# Patient Record
Sex: FEMALE | Race: BLACK OR AFRICAN AMERICAN | NOT HISPANIC OR LATINO | Employment: OTHER | URBAN - METROPOLITAN AREA
[De-identification: names, ages, dates, MRNs, and addresses within clinical notes are randomized per-mention and may not be internally consistent; named-entity substitution may affect disease eponyms.]

---

## 2019-09-01 ENCOUNTER — HOSPITAL ENCOUNTER (EMERGENCY)
Facility: HOSPITAL | Age: 74
Discharge: HOME | End: 2019-09-01
Attending: EMERGENCY MEDICINE
Payer: MEDICARE

## 2019-09-01 VITALS
RESPIRATION RATE: 18 BRPM | HEART RATE: 78 BPM | TEMPERATURE: 99 F | OXYGEN SATURATION: 95 % | WEIGHT: 175 LBS | DIASTOLIC BLOOD PRESSURE: 79 MMHG | SYSTOLIC BLOOD PRESSURE: 129 MMHG | HEIGHT: 66 IN | BODY MASS INDEX: 28.12 KG/M2

## 2019-09-01 DIAGNOSIS — J04.0 LARYNGITIS: ICD-10-CM

## 2019-09-01 DIAGNOSIS — H65.01 RIGHT ACUTE SEROUS OTITIS MEDIA, RECURRENCE NOT SPECIFIED: Primary | ICD-10-CM

## 2019-09-01 DIAGNOSIS — R93.89 ABNORMAL CHEST X-RAY: ICD-10-CM

## 2019-09-01 DIAGNOSIS — R05.9 COUGH: ICD-10-CM

## 2019-09-01 LAB — POCT GLUCOSE: 160 MG/DL (ref 70–110)

## 2019-09-01 PROCEDURE — 82962 GLUCOSE BLOOD TEST: CPT

## 2019-09-01 PROCEDURE — 25000003 PHARM REV CODE 250: Performed by: NURSE PRACTITIONER

## 2019-09-01 PROCEDURE — 99284 EMERGENCY DEPT VISIT MOD MDM: CPT | Mod: 25

## 2019-09-01 RX ORDER — HYDROCODONE BITARTRATE AND ACETAMINOPHEN 5; 325 MG/1; MG/1
1 TABLET ORAL
Status: COMPLETED | OUTPATIENT
Start: 2019-09-01 | End: 2019-09-01

## 2019-09-01 RX ORDER — AMOXICILLIN AND CLAVULANATE POTASSIUM 875; 125 MG/1; MG/1
1 TABLET, FILM COATED ORAL 2 TIMES DAILY
Qty: 14 TABLET | Refills: 0 | Status: ON HOLD | OUTPATIENT
Start: 2019-09-01 | End: 2022-09-15 | Stop reason: HOSPADM

## 2019-09-01 RX ORDER — HYDROCODONE BITARTRATE AND ACETAMINOPHEN 5; 325 MG/1; MG/1
1 TABLET ORAL EVERY 6 HOURS PRN
Qty: 12 TABLET | Refills: 0 | Status: SHIPPED | OUTPATIENT
Start: 2019-09-01

## 2019-09-01 RX ORDER — AMOXICILLIN AND CLAVULANATE POTASSIUM 875; 125 MG/1; MG/1
1 TABLET, FILM COATED ORAL
Status: COMPLETED | OUTPATIENT
Start: 2019-09-01 | End: 2019-09-01

## 2019-09-01 RX ADMIN — HYDROCODONE BITARTRATE AND ACETAMINOPHEN 1 TABLET: 5; 325 TABLET ORAL at 04:09

## 2019-09-01 RX ADMIN — AMOXICILLIN AND CLAVULANATE POTASSIUM 1 TABLET: 875; 125 TABLET, FILM COATED ORAL at 04:09

## 2019-09-01 NOTE — DISCHARGE INSTRUCTIONS
Take antibiotics as ordered. Alternate Tylenol and advil every 3 hours for fever/body aches. Cepacol lozenges, warm fluids to drink, and warm salt water gargles for sore throat. You have been given a medication that may cause drowsiness, do not drive or operate heavy machinery with this medication. Return to the Emergency department for any worsening or failure to improve, otherwise follow up with your primary care provider.

## 2019-09-01 NOTE — ED PROVIDER NOTES
Encounter Date: 9/1/2019       History     Chief Complaint   Patient presents with    Sinusitis     C/o right ear discomfort and sinus pressure x 1 week.    Otalgia     Chief complaint:  Sinusitis and right ear pain    History of present illness:  Patient is a 74-year-old female who reports 1 week of sinus pressure for which she saw her doctor (Dr. Saini) who gave her 2 injections as well as prescription for Tessalon Perles, 2nd gen antihistamine, azithromycin (z-pack).  Pt reports improvement then worsening.  She denies fever, chills, sob, but endorses cough with green sputum.    The history is provided by the patient, the spouse and medical records. No  was used.     Review of patient's allergies indicates:  No Known Allergies  Past Medical History:   Diagnosis Date    Diabetes mellitus     Hypertension      History reviewed. No pertinent surgical history.  History reviewed. No pertinent family history.  Social History     Tobacco Use    Smoking status: Never Smoker    Smokeless tobacco: Never Used   Substance Use Topics    Alcohol use: Not Currently    Drug use: Not on file     Review of Systems   Constitutional: Negative for chills, fatigue and fever.   HENT: Positive for ear pain, sinus pressure and voice change. Negative for congestion, ear discharge, postnasal drip, rhinorrhea, sneezing and sore throat.    Eyes: Negative for discharge and itching.   Respiratory: Positive for cough. Negative for shortness of breath and wheezing.    Cardiovascular: Negative for chest pain, palpitations and leg swelling.   Gastrointestinal: Negative for abdominal pain, constipation, diarrhea, nausea and vomiting.   Endocrine: Negative for polydipsia, polyphagia and polyuria.   Genitourinary: Negative for dysuria, frequency, hematuria, urgency, vaginal bleeding, vaginal discharge and vaginal pain.   Musculoskeletal: Negative for arthralgias and myalgias.   Skin: Negative for rash and wound.    Neurological: Negative for dizziness, seizures, syncope, weakness and numbness.   Hematological: Negative for adenopathy. Does not bruise/bleed easily.   Psychiatric/Behavioral: Negative for self-injury and suicidal ideas. The patient is not nervous/anxious.        Physical Exam     Initial Vitals [09/01/19 0414]   BP Pulse Resp Temp SpO2   (!) 172/80 89 18 99.9 °F (37.7 °C) 96 %      MAP       --         Physical Exam    Nursing note and vitals reviewed.  Constitutional: She appears well-developed and well-nourished.   HENT:   Head: Normocephalic and atraumatic.   Right Ear: Hearing, external ear and ear canal normal. Tympanic membrane is erythematous.   Left Ear: Hearing, tympanic membrane, external ear and ear canal normal.   Nose: No epistaxis. Right sinus exhibits maxillary sinus tenderness and frontal sinus tenderness. Left sinus exhibits maxillary sinus tenderness and frontal sinus tenderness.   Mouth/Throat: Oropharynx is clear and moist.   Hoarse voice   Eyes: Conjunctivae and EOM are normal. Pupils are equal, round, and reactive to light. Right eye exhibits no discharge. Left eye exhibits no discharge.   Neck: Normal range of motion.   Cardiovascular: Regular rhythm, S1 normal, S2 normal and normal heart sounds. Exam reveals no gallop.    No murmur heard.  Pulmonary/Chest: Effort normal and breath sounds normal. No respiratory distress. She has no decreased breath sounds. She has no wheezes. She has no rhonchi. She has no rales.   Abdominal: She exhibits no distension.   Musculoskeletal: Normal range of motion.   Neurological: She is alert and oriented to person, place, and time.   Skin: Skin is dry. Capillary refill takes less than 2 seconds.         ED Course   Procedures  Labs Reviewed   POCT GLUCOSE MONITORING CONTINUOUS          Imaging Results    None          Medical Decision Making:   Initial Assessment:   74-year-old female with sinus pressure, right ear pain  Differential Diagnosis:    Sinusitis, upper respiratory infection, pneumonia, otitis externa, otitis media, mastoiditis  ED Management:  Initial orders include Augmentin 875, Norco 5/325, point of care glucose, chest x-ray.                   ED Course as of Sep 01 0541   Sun Sep 01, 2019   0437 POCT Glucose(!): 160 [VC]      ED Course User Index  [VC] Johann Valdez DNP     Clinical Impression:       ICD-10-CM ICD-9-CM   1. Right acute serous otitis media, recurrence not specified H65.01 381.01   2. Cough R05 786.2   3. Laryngitis J04.0 464.00   4. Abnormal chest x-ray R93.89 793.2     Patient reported pain relief with Norco.  Prescription was provided for home use with drowsy warning.  She should continue Augmentin for the right ear infection and further coverage for prevention of pneumonia.  Dr. Cira shelley interpreted the chest x-ray and feels that an area is possibly a pulmonary nodule for which the patient should follow up with her primary care provider.  Patient to return for any worsening or changes in condition. Symptomatic therapies and return precautions on AVS.   Medication choices were made after reviewing allergies, medications, history, available laboratories.     Disposition:   Disposition: Discharged  Condition: Stable                        Johann Valdez DNP  09/01/19 0542

## 2019-09-03 ENCOUNTER — TELEPHONE (OUTPATIENT)
Dept: EMERGENCY MEDICINE | Facility: HOSPITAL | Age: 74
End: 2019-09-03

## 2019-09-05 ENCOUNTER — TELEPHONE (OUTPATIENT)
Dept: URGENT CARE | Facility: CLINIC | Age: 74
End: 2019-09-05

## 2019-09-05 NOTE — TELEPHONE ENCOUNTER
Patient called back was notified to follow up with primary care provider regarding the pulmonary nodule.  She states she is still having symptoms of ear pain and was notified to follow up with ENT.

## 2022-09-14 ENCOUNTER — HOSPITAL ENCOUNTER (OUTPATIENT)
Facility: HOSPITAL | Age: 77
Discharge: HOME | End: 2022-09-15
Attending: STUDENT IN AN ORGANIZED HEALTH CARE EDUCATION/TRAINING PROGRAM | Admitting: STUDENT IN AN ORGANIZED HEALTH CARE EDUCATION/TRAINING PROGRAM
Payer: MEDICARE

## 2022-09-14 DIAGNOSIS — R07.9 CHEST PAIN: Primary | ICD-10-CM

## 2022-09-14 DIAGNOSIS — M25.511 SHOULDER PAIN, RIGHT: ICD-10-CM

## 2022-09-14 DIAGNOSIS — I10 HYPERTENSION, UNSPECIFIED TYPE: ICD-10-CM

## 2022-09-14 DIAGNOSIS — M19.90 OSTEOARTHRITIS, UNSPECIFIED OSTEOARTHRITIS TYPE, UNSPECIFIED SITE: ICD-10-CM

## 2022-09-14 PROBLEM — E11.9 DM (DIABETES MELLITUS): Status: ACTIVE | Noted: 2022-09-14

## 2022-09-14 PROBLEM — E78.5 HLD (HYPERLIPIDEMIA): Status: ACTIVE | Noted: 2022-09-14

## 2022-09-14 PROBLEM — R52 ACUTE PAIN: Status: ACTIVE | Noted: 2022-09-14

## 2022-09-14 LAB
ALBUMIN SERPL BCP-MCNC: 3.6 G/DL (ref 3.5–5.2)
ALP SERPL-CCNC: 72 U/L (ref 55–135)
ALT SERPL W/O P-5'-P-CCNC: 9 U/L (ref 10–44)
ANION GAP SERPL CALC-SCNC: 11 MMOL/L (ref 8–16)
ASCENDING AORTA: 2.91 CM
AST SERPL-CCNC: 14 U/L (ref 10–40)
AV INDEX (PROSTH): 0.9
AV MEAN GRADIENT: 6 MMHG
AV PEAK GRADIENT: 12 MMHG
AV VALVE AREA: 3.01 CM2
AV VELOCITY RATIO: 0.7
BASOPHILS # BLD AUTO: 0.02 K/UL (ref 0–0.2)
BASOPHILS NFR BLD: 0.2 % (ref 0–1.9)
BILIRUB SERPL-MCNC: 0.3 MG/DL (ref 0.1–1)
BNP SERPL-MCNC: 48 PG/ML (ref 0–99)
BUN SERPL-MCNC: 21 MG/DL (ref 8–23)
CALCIUM SERPL-MCNC: 10.3 MG/DL (ref 8.7–10.5)
CHLORIDE SERPL-SCNC: 101 MMOL/L (ref 95–110)
CHOLEST SERPL-MCNC: 161 MG/DL (ref 120–199)
CHOLEST/HDLC SERPL: 3.6 {RATIO} (ref 2–5)
CO2 SERPL-SCNC: 25 MMOL/L (ref 23–29)
CREAT SERPL-MCNC: 0.9 MG/DL (ref 0.5–1.4)
CTP QC/QA: YES
CV ECHO LV RWT: 0.64 CM
CV STRESS BASE HR: 71 BPM
DIASTOLIC BLOOD PRESSURE: 83 MMHG
DIFFERENTIAL METHOD: ABNORMAL
DOP CALC AO PEAK VEL: 1.7 M/S
DOP CALC AO VTI: 31 CM
DOP CALC LVOT AREA: 3.3 CM2
DOP CALC LVOT DIAMETER: 2.06 CM
DOP CALC LVOT PEAK VEL: 1.19 M/S
DOP CALC LVOT STROKE VOLUME: 93.27 CM3
DOP CALCLVOT PEAK VEL VTI: 28 CM
E WAVE DECELERATION TIME: 283.67 MSEC
E/A RATIO: 0.88
ECHO LV POSTERIOR WALL: 1.3 CM (ref 0.6–1.1)
EJECTION FRACTION: 70 %
EOSINOPHIL # BLD AUTO: 0.1 K/UL (ref 0–0.5)
EOSINOPHIL NFR BLD: 0.7 % (ref 0–8)
ERYTHROCYTE [DISTWIDTH] IN BLOOD BY AUTOMATED COUNT: 12.6 % (ref 11.5–14.5)
EST. GFR  (NO RACE VARIABLE): >60 ML/MIN/1.73 M^2
ESTIMATED AVG GLUCOSE: 217 MG/DL (ref 68–131)
FRACTIONAL SHORTENING: 35 % (ref 28–44)
GLUCOSE SERPL-MCNC: 194 MG/DL (ref 70–110)
HBA1C MFR BLD: 9.2 % (ref 4–5.6)
HCT VFR BLD AUTO: 37.7 % (ref 37–48.5)
HDLC SERPL-MCNC: 45 MG/DL (ref 40–75)
HDLC SERPL: 28 % (ref 20–50)
HGB BLD-MCNC: 12.8 G/DL (ref 12–16)
IMM GRANULOCYTES # BLD AUTO: 0.03 K/UL (ref 0–0.04)
IMM GRANULOCYTES NFR BLD AUTO: 0.3 % (ref 0–0.5)
INTERVENTRICULAR SEPTUM: 1.27 CM (ref 0.6–1.1)
IVC DIAMETER: 1.29 CM
IVRT: 110.37 MSEC
LA MAJOR: 4.2 CM
LA MINOR: 3.66 CM
LA WIDTH: 3.7 CM
LDLC SERPL CALC-MCNC: 70 MG/DL (ref 63–159)
LEFT ATRIUM SIZE: 4.17 CM
LEFT ATRIUM VOLUME: 51.3 CM3
LEFT INTERNAL DIMENSION IN SYSTOLE: 2.65 CM (ref 2.1–4)
LEFT VENTRICLE DIASTOLIC VOLUME: 73.78 ML
LEFT VENTRICLE SYSTOLIC VOLUME: 25.75 ML
LEFT VENTRICULAR INTERNAL DIMENSION IN DIASTOLE: 4.09 CM (ref 3.5–6)
LEFT VENTRICULAR MASS: 189.46 G
LV LATERAL E/E' RATIO: 7.56 M/S
LVOT MG: 3.81 MMHG
LVOT MV: 0.93 CM/S
LYMPHOCYTES # BLD AUTO: 3.8 K/UL (ref 1–4.8)
LYMPHOCYTES NFR BLD: 35.7 % (ref 18–48)
MAGNESIUM SERPL-MCNC: 1.3 MG/DL (ref 1.6–2.6)
MCH RBC QN AUTO: 28.7 PG (ref 27–31)
MCHC RBC AUTO-ENTMCNC: 34 G/DL (ref 32–36)
MCV RBC AUTO: 85 FL (ref 82–98)
MONOCYTES # BLD AUTO: 1.1 K/UL (ref 0.3–1)
MONOCYTES NFR BLD: 9.8 % (ref 4–15)
MV PEAK A VEL: 0.77 M/S
MV PEAK E VEL: 0.68 M/S
MV STENOSIS PRESSURE HALF TIME: 82.26 MS
MV VALVE AREA P 1/2 METHOD: 2.67 CM2
NEUTROPHILS # BLD AUTO: 5.7 K/UL (ref 1.8–7.7)
NEUTROPHILS NFR BLD: 53.3 % (ref 38–73)
NONHDLC SERPL-MCNC: 116 MG/DL
NRBC BLD-RTO: 0 /100 WBC
NUC STRESS EJECTION FRACTION: 79 %
OHS CV CPX 85 PERCENT MAX PREDICTED HEART RATE MALE: 119
OHS CV CPX MAX PREDICTED HEART RATE: 140
OHS CV CPX PATIENT IS FEMALE: 1
OHS CV CPX PATIENT IS MALE: 0
OHS CV CPX PEAK DIASTOLIC BLOOD PRESSURE: 72 MMHG
OHS CV CPX PEAK HEAR RATE: 104 BPM
OHS CV CPX PEAK RATE PRESSURE PRODUCT: NORMAL
OHS CV CPX PEAK SYSTOLIC BLOOD PRESSURE: 161 MMHG
OHS CV CPX PERCENT MAX PREDICTED HEART RATE ACHIEVED: 74
OHS CV CPX RATE PRESSURE PRODUCT PRESENTING: NORMAL
PHOSPHATE SERPL-MCNC: 3.2 MG/DL (ref 2.7–4.5)
PISA TR MAX VEL: 2.72 M/S
PLATELET # BLD AUTO: 226 K/UL (ref 150–450)
PMV BLD AUTO: 10.4 FL (ref 9.2–12.9)
POCT GLUCOSE: 189 MG/DL (ref 70–110)
POCT GLUCOSE: 268 MG/DL (ref 70–110)
POCT GLUCOSE: 341 MG/DL (ref 70–110)
POCT GLUCOSE: 362 MG/DL (ref 70–110)
POTASSIUM SERPL-SCNC: 3.7 MMOL/L (ref 3.5–5.1)
PROT SERPL-MCNC: 7.4 G/DL (ref 6–8.4)
PV PEAK VELOCITY: 0.95 CM/S
RA MAJOR: 5.02 CM
RA PRESSURE: 3 MMHG
RA WIDTH: 3.14 CM
RBC # BLD AUTO: 4.46 M/UL (ref 4–5.4)
RIGHT VENTRICULAR END-DIASTOLIC DIMENSION: 3.67 CM
SARS-COV-2 RDRP RESP QL NAA+PROBE: NEGATIVE
SINUS: 2.84 CM
SODIUM SERPL-SCNC: 137 MMOL/L (ref 136–145)
STJ: 2.34 CM
SYSTOLIC BLOOD PRESSURE: 151 MMHG
TDI LATERAL: 0.09 M/S
TR MAX PG: 30 MMHG
TRICUSPID ANNULAR PLANE SYSTOLIC EXCURSION: 2.14 CM
TRIGL SERPL-MCNC: 230 MG/DL (ref 30–150)
TROPONIN I SERPL DL<=0.01 NG/ML-MCNC: <0.006 NG/ML (ref 0–0.03)
TSH SERPL DL<=0.005 MIU/L-ACNC: 0.66 UIU/ML (ref 0.4–4)
TV PEAK GRADIENT: 0.83 MMHG
TV REST PULMONARY ARTERY PRESSURE: 33 MMHG
WBC # BLD AUTO: 10.66 K/UL (ref 3.9–12.7)

## 2022-09-14 PROCEDURE — 25000003 PHARM REV CODE 250: Performed by: NURSE PRACTITIONER

## 2022-09-14 PROCEDURE — G0378 HOSPITAL OBSERVATION PER HR: HCPCS

## 2022-09-14 PROCEDURE — 80053 COMPREHEN METABOLIC PANEL: CPT | Performed by: STUDENT IN AN ORGANIZED HEALTH CARE EDUCATION/TRAINING PROGRAM

## 2022-09-14 PROCEDURE — 85025 COMPLETE CBC W/AUTO DIFF WBC: CPT | Performed by: STUDENT IN AN ORGANIZED HEALTH CARE EDUCATION/TRAINING PROGRAM

## 2022-09-14 PROCEDURE — 36415 COLL VENOUS BLD VENIPUNCTURE: CPT | Performed by: NURSE PRACTITIONER

## 2022-09-14 PROCEDURE — 99285 EMERGENCY DEPT VISIT HI MDM: CPT | Mod: 25

## 2022-09-14 PROCEDURE — 96375 TX/PRO/DX INJ NEW DRUG ADDON: CPT | Mod: 59

## 2022-09-14 PROCEDURE — 25000003 PHARM REV CODE 250: Performed by: STUDENT IN AN ORGANIZED HEALTH CARE EDUCATION/TRAINING PROGRAM

## 2022-09-14 PROCEDURE — 84484 ASSAY OF TROPONIN QUANT: CPT | Mod: 91 | Performed by: NURSE PRACTITIONER

## 2022-09-14 PROCEDURE — U0002 COVID-19 LAB TEST NON-CDC: HCPCS | Performed by: STUDENT IN AN ORGANIZED HEALTH CARE EDUCATION/TRAINING PROGRAM

## 2022-09-14 PROCEDURE — 99220 PR INITIAL OBSERVATION CARE,LEVL III: ICD-10-PCS | Mod: 25,,, | Performed by: INTERNAL MEDICINE

## 2022-09-14 PROCEDURE — 63600175 PHARM REV CODE 636 W HCPCS: Performed by: STUDENT IN AN ORGANIZED HEALTH CARE EDUCATION/TRAINING PROGRAM

## 2022-09-14 PROCEDURE — 63600175 PHARM REV CODE 636 W HCPCS: Performed by: INTERNAL MEDICINE

## 2022-09-14 PROCEDURE — 83735 ASSAY OF MAGNESIUM: CPT | Performed by: STUDENT IN AN ORGANIZED HEALTH CARE EDUCATION/TRAINING PROGRAM

## 2022-09-14 PROCEDURE — 84100 ASSAY OF PHOSPHORUS: CPT | Performed by: NURSE PRACTITIONER

## 2022-09-14 PROCEDURE — 93010 EKG 12-LEAD: ICD-10-PCS | Mod: ,,, | Performed by: INTERNAL MEDICINE

## 2022-09-14 PROCEDURE — 83036 HEMOGLOBIN GLYCOSYLATED A1C: CPT | Performed by: NURSE PRACTITIONER

## 2022-09-14 PROCEDURE — 82962 GLUCOSE BLOOD TEST: CPT

## 2022-09-14 PROCEDURE — 99220 PR INITIAL OBSERVATION CARE,LEVL III: CPT | Mod: 25,,, | Performed by: INTERNAL MEDICINE

## 2022-09-14 PROCEDURE — 96372 THER/PROPH/DIAG INJ SC/IM: CPT | Mod: 59 | Performed by: STUDENT IN AN ORGANIZED HEALTH CARE EDUCATION/TRAINING PROGRAM

## 2022-09-14 PROCEDURE — 96372 THER/PROPH/DIAG INJ SC/IM: CPT | Performed by: NURSE PRACTITIONER

## 2022-09-14 PROCEDURE — 96374 THER/PROPH/DIAG INJ IV PUSH: CPT | Mod: 59

## 2022-09-14 PROCEDURE — 84484 ASSAY OF TROPONIN QUANT: CPT | Mod: 91 | Performed by: STUDENT IN AN ORGANIZED HEALTH CARE EDUCATION/TRAINING PROGRAM

## 2022-09-14 PROCEDURE — 93010 ELECTROCARDIOGRAM REPORT: CPT | Mod: ,,, | Performed by: INTERNAL MEDICINE

## 2022-09-14 PROCEDURE — 63600175 PHARM REV CODE 636 W HCPCS: Performed by: NURSE PRACTITIONER

## 2022-09-14 PROCEDURE — 84443 ASSAY THYROID STIM HORMONE: CPT | Performed by: NURSE PRACTITIONER

## 2022-09-14 PROCEDURE — 93005 ELECTROCARDIOGRAM TRACING: CPT

## 2022-09-14 PROCEDURE — 80061 LIPID PANEL: CPT | Performed by: NURSE PRACTITIONER

## 2022-09-14 PROCEDURE — 83880 ASSAY OF NATRIURETIC PEPTIDE: CPT | Performed by: STUDENT IN AN ORGANIZED HEALTH CARE EDUCATION/TRAINING PROGRAM

## 2022-09-14 RX ORDER — HYDRALAZINE HYDROCHLORIDE 20 MG/ML
10 INJECTION INTRAMUSCULAR; INTRAVENOUS EVERY 6 HOURS PRN
Status: DISCONTINUED | OUTPATIENT
Start: 2022-09-14 | End: 2022-09-15 | Stop reason: HOSPADM

## 2022-09-14 RX ORDER — MORPHINE SULFATE 4 MG/ML
6 INJECTION, SOLUTION INTRAMUSCULAR; INTRAVENOUS
Status: COMPLETED | OUTPATIENT
Start: 2022-09-14 | End: 2022-09-14

## 2022-09-14 RX ORDER — ATORVASTATIN CALCIUM 10 MG/1
20 TABLET, FILM COATED ORAL DAILY
Status: DISCONTINUED | OUTPATIENT
Start: 2022-09-14 | End: 2022-09-15 | Stop reason: HOSPADM

## 2022-09-14 RX ORDER — SODIUM CHLORIDE 0.9 % (FLUSH) 0.9 %
10 SYRINGE (ML) INJECTION
Status: DISCONTINUED | OUTPATIENT
Start: 2022-09-14 | End: 2022-09-15 | Stop reason: HOSPADM

## 2022-09-14 RX ORDER — INSULIN ASPART 100 [IU]/ML
0-5 INJECTION, SOLUTION INTRAVENOUS; SUBCUTANEOUS EVERY 6 HOURS PRN
Status: DISCONTINUED | OUTPATIENT
Start: 2022-09-14 | End: 2022-09-15 | Stop reason: HOSPADM

## 2022-09-14 RX ORDER — ENOXAPARIN SODIUM 100 MG/ML
40 INJECTION SUBCUTANEOUS EVERY 24 HOURS
Status: DISCONTINUED | OUTPATIENT
Start: 2022-09-14 | End: 2022-09-15 | Stop reason: HOSPADM

## 2022-09-14 RX ORDER — HYDRALAZINE HYDROCHLORIDE 20 MG/ML
10 INJECTION INTRAMUSCULAR; INTRAVENOUS
Status: COMPLETED | OUTPATIENT
Start: 2022-09-14 | End: 2022-09-14

## 2022-09-14 RX ORDER — REGADENOSON 0.08 MG/ML
0.4 INJECTION, SOLUTION INTRAVENOUS ONCE
Status: COMPLETED | OUTPATIENT
Start: 2022-09-14 | End: 2022-09-14

## 2022-09-14 RX ORDER — LISINOPRIL 20 MG/1
20 TABLET ORAL 2 TIMES DAILY
Status: DISCONTINUED | OUTPATIENT
Start: 2022-09-14 | End: 2022-09-15 | Stop reason: HOSPADM

## 2022-09-14 RX ORDER — CETIRIZINE HYDROCHLORIDE 5 MG/1
5 TABLET ORAL DAILY
Status: DISCONTINUED | OUTPATIENT
Start: 2022-09-14 | End: 2022-09-15 | Stop reason: HOSPADM

## 2022-09-14 RX ORDER — OMEPRAZOLE 20 MG/1
20 CAPSULE, DELAYED RELEASE ORAL DAILY
COMMUNITY

## 2022-09-14 RX ORDER — GLUCAGON 1 MG
1 KIT INJECTION
Status: DISCONTINUED | OUTPATIENT
Start: 2022-09-14 | End: 2022-09-15 | Stop reason: HOSPADM

## 2022-09-14 RX ORDER — METFORMIN HYDROCHLORIDE 500 MG/1
500 TABLET ORAL 2 TIMES DAILY WITH MEALS
COMMUNITY

## 2022-09-14 RX ORDER — KETOROLAC TROMETHAMINE 30 MG/ML
15 INJECTION, SOLUTION INTRAMUSCULAR; INTRAVENOUS
Status: COMPLETED | OUTPATIENT
Start: 2022-09-14 | End: 2022-09-14

## 2022-09-14 RX ORDER — METOPROLOL SUCCINATE 200 MG/1
200 TABLET, EXTENDED RELEASE ORAL DAILY
COMMUNITY

## 2022-09-14 RX ORDER — CETIRIZINE HYDROCHLORIDE 10 MG/1
10 TABLET ORAL DAILY
COMMUNITY

## 2022-09-14 RX ORDER — DEXAMETHASONE SODIUM PHOSPHATE 4 MG/ML
12 INJECTION, SOLUTION INTRA-ARTICULAR; INTRALESIONAL; INTRAMUSCULAR; INTRAVENOUS; SOFT TISSUE
Status: COMPLETED | OUTPATIENT
Start: 2022-09-14 | End: 2022-09-14

## 2022-09-14 RX ORDER — METOPROLOL SUCCINATE 50 MG/1
200 TABLET, EXTENDED RELEASE ORAL DAILY
Status: DISCONTINUED | OUTPATIENT
Start: 2022-09-14 | End: 2022-09-15 | Stop reason: HOSPADM

## 2022-09-14 RX ORDER — OXYCODONE AND ACETAMINOPHEN 5; 325 MG/1; MG/1
1 TABLET ORAL EVERY 6 HOURS PRN
Status: DISCONTINUED | OUTPATIENT
Start: 2022-09-14 | End: 2022-09-15 | Stop reason: HOSPADM

## 2022-09-14 RX ORDER — ATORVASTATIN CALCIUM 10 MG/1
10 TABLET, FILM COATED ORAL DAILY
COMMUNITY

## 2022-09-14 RX ORDER — LISINOPRIL 20 MG/1
20 TABLET ORAL 2 TIMES DAILY
COMMUNITY

## 2022-09-14 RX ORDER — PANTOPRAZOLE SODIUM 40 MG/1
40 TABLET, DELAYED RELEASE ORAL DAILY
Status: DISCONTINUED | OUTPATIENT
Start: 2022-09-14 | End: 2022-09-15 | Stop reason: HOSPADM

## 2022-09-14 RX ADMIN — INSULIN ASPART 1 UNITS: 100 INJECTION, SOLUTION INTRAVENOUS; SUBCUTANEOUS at 11:09

## 2022-09-14 RX ADMIN — METOPROLOL SUCCINATE 200 MG: 50 TABLET, EXTENDED RELEASE ORAL at 11:09

## 2022-09-14 RX ADMIN — NITROGLYCERIN 1 INCH: 20 OINTMENT TOPICAL at 11:09

## 2022-09-14 RX ADMIN — PANTOPRAZOLE SODIUM 40 MG: 40 TABLET, DELAYED RELEASE ORAL at 12:09

## 2022-09-14 RX ADMIN — INSULIN ASPART 5 UNITS: 100 INJECTION, SOLUTION INTRAVENOUS; SUBCUTANEOUS at 05:09

## 2022-09-14 RX ADMIN — LISINOPRIL 20 MG: 20 TABLET ORAL at 11:09

## 2022-09-14 RX ADMIN — INSULIN ASPART 4 UNITS: 100 INJECTION, SOLUTION INTRAVENOUS; SUBCUTANEOUS at 12:09

## 2022-09-14 RX ADMIN — NITROGLYCERIN 1 INCH: 20 OINTMENT TOPICAL at 05:09

## 2022-09-14 RX ADMIN — KETOROLAC TROMETHAMINE 15 MG: 30 INJECTION, SOLUTION INTRAMUSCULAR; INTRAVENOUS at 04:09

## 2022-09-14 RX ADMIN — LISINOPRIL 20 MG: 20 TABLET ORAL at 09:09

## 2022-09-14 RX ADMIN — INSULIN DETEMIR 25 UNITS: 100 INJECTION, SOLUTION SUBCUTANEOUS at 05:09

## 2022-09-14 RX ADMIN — NITROGLYCERIN 1 INCH: 20 OINTMENT TOPICAL at 04:09

## 2022-09-14 RX ADMIN — DEXAMETHASONE SODIUM PHOSPHATE 12 MG: 4 INJECTION INTRA-ARTICULAR; INTRALESIONAL; INTRAMUSCULAR; INTRAVENOUS; SOFT TISSUE at 03:09

## 2022-09-14 RX ADMIN — HYDRALAZINE HYDROCHLORIDE 10 MG: 20 INJECTION INTRAMUSCULAR; INTRAVENOUS at 03:09

## 2022-09-14 RX ADMIN — REGADENOSON 0.4 MG: 0.08 INJECTION, SOLUTION INTRAVENOUS at 10:09

## 2022-09-14 RX ADMIN — MORPHINE SULFATE 6 MG: 4 INJECTION INTRAVENOUS at 03:09

## 2022-09-14 RX ADMIN — ENOXAPARIN SODIUM 40 MG: 100 INJECTION SUBCUTANEOUS at 05:09

## 2022-09-14 RX ADMIN — CETIRIZINE HYDROCHLORIDE 5 MG: 5 TABLET ORAL at 12:09

## 2022-09-14 RX ADMIN — ATORVASTATIN CALCIUM 20 MG: 10 TABLET, FILM COATED ORAL at 11:09

## 2022-09-14 NOTE — PLAN OF CARE
Problem: Adult Inpatient Plan of Care  Goal: Plan of Care Review  Outcome: Ongoing, Progressing  Goal: Patient-Specific Goal (Individualized)  Outcome: Ongoing, Progressing  Goal: Absence of Hospital-Acquired Illness or Injury  Outcome: Ongoing, Progressing  Goal: Optimal Comfort and Wellbeing  Outcome: Ongoing, Progressing  Goal: Readiness for Transition of Care  Outcome: Ongoing, Progressing     Problem: Diabetes Comorbidity  Goal: Blood Glucose Level Within Targeted Range  Outcome: Ongoing, Progressing     Progressing well. Planned for d/c today but held d/t hyperglycemia. All questions and concerns answered and addressed. Will continue with POC.

## 2022-09-14 NOTE — H&P
[facility]  History & Physical    Patient Name: Sussy Fulton  MRN: [id number]  Patient Class: OP- Observation  Admission Date: 9/14/2022  Attending Physician: Erasmo Alvarez MD   Primary Care Provider: Arlen Rivera MD         Patient information was obtained from patient and ER records.     Subjective:     Principal Problem:Chest pain    Chief Complaint:   Chief Complaint   Patient presents with    Arm Pain     R arm pain and stiffness, that started yesterday morning. Reports last night she started having palpitations and chest pain, denies SOB, n/v.         HPI: 77 year old female  presents with arm pain and chest pain.  Patient states that her arm pain is chronic but acutely worsened today, starts at the neck and radiates down her right arm to her hand, he has 9/10 intensity currently, aggravated with movement of the arm, no relieving factors, and associated with decreased range of motion secondary to pain.  Patient reports a chronic history of neck and arm pain, though today it is worse without new injury/trauma.  She took Tylenol and an arthritis medication without relief.  She also reports an episode of substernal chest pressure around 8:00 p.m. that lasted about 30 minutes and was associated with mild shortness of breath.  It was nonexertional.  ROS otherwise negative.Denies tobacco use and alcohol use. PMHx of HTN and DM      Past Medical History:   Diagnosis Date    Diabetes mellitus     Hypertension        No past surgical history on file.    Review of patient's allergies indicates:  No Known Allergies    No current facility-administered medications on file prior to encounter.     Current Outpatient Medications on File Prior to Encounter   Medication Sig    amoxicillin-clavulanate 875-125mg (AUGMENTIN) 875-125 mg per tablet Take 1 tablet by mouth 2 (two) times daily.    HYDROcodone-acetaminophen (NORCO) 5-325 mg per tablet Take 1 tablet by mouth every 6 (six)  hours as needed.     Family History    None       Tobacco Use    Smoking status: Never    Smokeless tobacco: Never   Substance and Sexual Activity    Alcohol use: Not Currently    Drug use: Not on file    Sexual activity: Not on file     Review of Systems  Constitutional:  Negative for chills and fever.   HENT:  Negative for congestion and sinus pain.    Eyes:  Negative for pain and visual disturbance.   Respiratory:  Positive for shortness of breath. Negative for cough.    Cardiovascular:  Positive for chest pain. Negative for leg swelling.   Gastrointestinal:  Negative for abdominal pain, constipation, diarrhea, nausea and vomiting.   Endocrine: Negative for polydipsia and polyuria.   Genitourinary:  Negative for dysuria and hematuria.   Musculoskeletal:  Positive for neck pain. Negative for arthralgias and back pain.   Skin:  Negative for color change and rash.   Neurological:  Negative for dizziness, seizures, syncope, weakness, light-headedness and headaches.      Objective:     Vital Signs (Most Recent):  Temp: 98.1 °F (36.7 °C) (09/14/22 0218)  Pulse: 82 (09/14/22 0401)  Resp: (!) 24 (09/14/22 0401)  BP: (!) 187/84 (09/14/22 0401)  SpO2: 99 % (09/14/22 0401)   Vital Signs (24h Range):  Temp:  [98.1 °F (36.7 °C)] 98.1 °F (36.7 °C)  Pulse:  [71-82] 82  Resp:  [16-24] 24  SpO2:  [99 %-100 %] 99 %  BP: (168-235)/() 187/84     Weight: 81.6 kg (180 lb)  Body mass index is 29.05 kg/m².    Physical Exam   Nursing note and vitals reviewed.  Constitutional: She appears well-developed and well-nourished. She is not diaphoretic. No distress.   HENT:   Head: Normocephalic and atraumatic.   Eyes: Conjunctivae and EOM are normal. Pupils are equal, round, and reactive to light.   Neck: Neck supple. No JVD present.   No cervical spine tenderness to palpation   Normal range of motion.  Cardiovascular:  Normal rate, regular rhythm, normal heart sounds and intact distal pulses.           No murmur  heard.  Pulmonary/Chest: Breath sounds normal. No respiratory distress. She has no wheezes. She has no rhonchi. She has no rales.   Abdominal: Abdomen is soft. She exhibits no distension. There is no abdominal tenderness. There is no rebound and no guarding.   Musculoskeletal:         General: No edema.      Cervical back: Normal range of motion and neck supple.      Comments: Mild TTP of trapezius on right.   Neck: neck/right arm pain exacerbated by movement of the neck   Neurological: She is alert and oriented to person, place, and time. She has normal strength. No sensory deficit.   Skin: Skin is warm and dry. Capillary refill takes less than 2 seconds.      Significant Labs: All pertinent labs within the past 24 hours have been reviewed.    Significant Imaging: I have reviewed all pertinent imaging results/findings within the past 24 hours.    Assessment/Plan:     * Chest pain  Consult cards  Trend trop <0.006  Echo in am  Tele monitoring  EKG as needed  Nitro paste q6H       HLD (hyperlipidemia)  Resume statin (patient doesn't know the dose)\  Lipid panel pending       Acute pain  C/o neck pain and right arm  X-ray:  No definite radiographic evidence of acute displaced fracture or dislocation of the right shoulder.  Moderate osteoarthrosis.  Pain control   Given steroid IV in the ED       DM (diabetes mellitus)  Patient's FSGs are uncontrolled due to hyperglycemia on current medication regimen.  Last A1c reviewed- No results found for: LABA1C, HGBA1C  Most recent fingerstick glucose reviewed- Recent Labs   Lab 09/14/22  0403   POCTGLUCOSE 189*     Current correctional scale  Low  Maintain anti-hyperglycemic dose as follows-   Antihyperglycemics (From admission, onward)    Start     Stop Route Frequency Ordered    09/14/22 0523  insulin aspart U-100 pen 0-5 Units         -- SubQ Every 6 hours PRN 09/14/22 0423        Hold Oral hypoglycemics while patient is in the hospital.    HTN (hypertension)  BP  uncontrolled  Resume home meds  Hydralazine prn         VTE Risk Mitigation (From admission, onward)         Ordered     enoxaparin injection 40 mg  Daily         09/14/22 0535              As clarification, on 9/14/22 @0530, patient should be placed in hospital observation services under my care in collaboration with MD Randi Shetty NP  Department of Hospital Medicine   [facility] [place] - Emergency Dept

## 2022-09-14 NOTE — SUBJECTIVE & OBJECTIVE
Past Medical History:   Diagnosis Date    Diabetes mellitus     Hypertension        No past surgical history on file.    Review of patient's allergies indicates:  No Known Allergies    No current facility-administered medications on file prior to encounter.     Current Outpatient Medications on File Prior to Encounter   Medication Sig    amoxicillin-clavulanate 875-125mg (AUGMENTIN) 875-125 mg per tablet Take 1 tablet by mouth 2 (two) times daily.    HYDROcodone-acetaminophen (NORCO) 5-325 mg per tablet Take 1 tablet by mouth every 6 (six) hours as needed.     Family History    None       Tobacco Use    Smoking status: Never    Smokeless tobacco: Never   Substance and Sexual Activity    Alcohol use: Not Currently    Drug use: Not on file    Sexual activity: Not on file     Review of Systems  Constitutional:  Negative for chills and fever.   HENT:  Negative for congestion and sinus pain.    Eyes:  Negative for pain and visual disturbance.   Respiratory:  Positive for shortness of breath. Negative for cough.    Cardiovascular:  Positive for chest pain. Negative for leg swelling.   Gastrointestinal:  Negative for abdominal pain, constipation, diarrhea, nausea and vomiting.   Endocrine: Negative for polydipsia and polyuria.   Genitourinary:  Negative for dysuria and hematuria.   Musculoskeletal:  Positive for neck pain. Negative for arthralgias and back pain.   Skin:  Negative for color change and rash.   Neurological:  Negative for dizziness, seizures, syncope, weakness, light-headedness and headaches.      Objective:     Vital Signs (Most Recent):  Temp: 98.1 °F (36.7 °C) (09/14/22 0218)  Pulse: 82 (09/14/22 0401)  Resp: (!) 24 (09/14/22 0401)  BP: (!) 187/84 (09/14/22 0401)  SpO2: 99 % (09/14/22 0401)   Vital Signs (24h Range):  Temp:  [98.1 °F (36.7 °C)] 98.1 °F (36.7 °C)  Pulse:  [71-82] 82  Resp:  [16-24] 24  SpO2:  [99 %-100 %] 99 %  BP: (168-235)/() 187/84     Weight: 81.6 kg (180 lb)  Body mass index is  29.05 kg/m².    Physical Exam   Nursing note and vitals reviewed.  Constitutional: She appears well-developed and well-nourished. She is not diaphoretic. No distress.   HENT:   Head: Normocephalic and atraumatic.   Eyes: Conjunctivae and EOM are normal. Pupils are equal, round, and reactive to light.   Neck: Neck supple. No JVD present.   No cervical spine tenderness to palpation   Normal range of motion.  Cardiovascular:  Normal rate, regular rhythm, normal heart sounds and intact distal pulses.           No murmur heard.  Pulmonary/Chest: Breath sounds normal. No respiratory distress. She has no wheezes. She has no rhonchi. She has no rales.   Abdominal: Abdomen is soft. She exhibits no distension. There is no abdominal tenderness. There is no rebound and no guarding.   Musculoskeletal:         General: No edema.      Cervical back: Normal range of motion and neck supple.      Comments: Mild TTP of trapezius on right.   Neck: neck/right arm pain exacerbated by movement of the neck   Neurological: She is alert and oriented to person, place, and time. She has normal strength. No sensory deficit.   Skin: Skin is warm and dry. Capillary refill takes less than 2 seconds.      Significant Labs: All pertinent labs within the past 24 hours have been reviewed.    Significant Imaging: I have reviewed all pertinent imaging results/findings within the past 24 hours.

## 2022-09-14 NOTE — CONSULTS
[facility]  Cardiology  Consult Note    Patient Name: Sussy Fulton  MRN: [id number]  Admission Date: 9/14/2022  Hospital Length of Stay: 0 days  Code Status: Full Code   Attending Provider: Erasmo Alvarez MD   Consulting Provider: Kendrick Hayward MD  Primary Care Physician: Arlen Rivera MD  Principal Problem:Chest pain    Patient information was obtained from patient and ER records.     Consults  Subjective:     Chief Complaint:  CP        HPI: 77 year old female  presents with arm pain and chest pain.  Patient states that her arm pain is chronic but acutely worsened today, starts at the neck and radiates down her right arm to her hand, he has 9/10 intensity currently, aggravated with movement of the arm, no relieving factors, and associated with decreased range of motion secondary to pain.  Patient reports a chronic history of neck and arm pain, though today it is worse without new injury/trauma.  She took Tylenol and an arthritis medication without relief.  She also reports an episode of substernal chest pressure around 8:00 p.m. that lasted about 30 minutes and was associated with mild shortness of breath.  It was nonexertional.  ROS otherwise negative.Denies tobacco use and alcohol use. PMHx of HTN and DM     Currently denies CP or SOB  EKG NSR - ok  Troponin negative x 2  BNP 48    Echo 9/14/22   The left ventricle is normal in size with concentric hypertrophy and normal systolic function.   The estimated ejection fraction is 70%.   Normal left ventricular diastolic function.   Normal right ventricular size with normal right ventricular systolic function.   Mild left atrial enlargement.   Normal central venous pressure (3 mmHg).   The estimated PA systolic pressure is 33 mmHg.      Past Medical History:   Diagnosis Date    Diabetes mellitus     Hypertension        No past surgical history on file.    Review of patient's allergies indicates:  No Known Allergies    No current  facility-administered medications on file prior to encounter.     Current Outpatient Medications on File Prior to Encounter   Medication Sig    amoxicillin-clavulanate 875-125mg (AUGMENTIN) 875-125 mg per tablet Take 1 tablet by mouth 2 (two) times daily.    atorvastatin (LIPITOR) 10 MG tablet Take 10 mg by mouth once daily. Patient doesn't know the dose    HYDROcodone-acetaminophen (NORCO) 5-325 mg per tablet Take 1 tablet by mouth every 6 (six) hours as needed.    lisinopriL (PRINIVIL,ZESTRIL) 20 MG tablet Take 20 mg by mouth 2 (two) times a day.    metFORMIN (GLUCOPHAGE) 500 MG tablet Take 500 mg by mouth 2 (two) times daily with meals.    metoprolol succinate (TOPROL-XL) 200 MG 24 hr tablet Take 200 mg by mouth once daily.     Family History    None       Tobacco Use    Smoking status: Never    Smokeless tobacco: Never   Substance and Sexual Activity    Alcohol use: Not Currently    Drug use: Not on file    Sexual activity: Not on file     Review of Systems   Constitutional: Negative for decreased appetite.   HENT:  Negative for ear discharge.    Eyes:  Negative for blurred vision.   Respiratory:  Negative for hemoptysis.    Endocrine: Negative for polyphagia.   Hematologic/Lymphatic: Negative for adenopathy.   Skin:  Negative for color change.   Musculoskeletal:  Negative for joint swelling.   Genitourinary:  Negative for bladder incontinence.   Neurological:  Negative for brief paralysis.   Psychiatric/Behavioral:  Negative for hallucinations.    Allergic/Immunologic: Negative for hives.   Objective:     Vital Signs (Most Recent):  Temp: 98.1 °F (36.7 °C) (09/14/22 0218)  Pulse: 84 (09/14/22 0618)  Resp: 15 (09/14/22 0618)  BP: 133/62 (09/14/22 0618)  SpO2: 95 % (09/14/22 0618) Vital Signs (24h Range):  Temp:  [98.1 °F (36.7 °C)] 98.1 °F (36.7 °C)  Pulse:  [71-85] 84  Resp:  [15-24] 15  SpO2:  [95 %-100 %] 95 %  BP: (133-235)/() 133/62     Weight: 81.6 kg (180 lb)  Body mass index is 29.05  kg/m².    SpO2: 95 %  O2 Device (Oxygen Therapy): room air      Intake/Output Summary (Last 24 hours) at 9/14/2022 1026  Last data filed at 9/14/2022 0830  Gross per 24 hour   Intake 0 ml   Output --   Net 0 ml       Lines/Drains/Airways       Peripheral Intravenous Line  Duration                  Peripheral IV - Single Lumen 09/14/22 0315 20 G Posterior;Left Hand <1 day                    Physical Exam  Constitutional:       Appearance: She is well-developed.   HENT:      Head: Normocephalic and atraumatic.   Eyes:      Conjunctiva/sclera: Conjunctivae normal.      Pupils: Pupils are equal, round, and reactive to light.   Cardiovascular:      Rate and Rhythm: Normal rate.      Pulses: Intact distal pulses.      Heart sounds: Normal heart sounds.   Pulmonary:      Effort: Pulmonary effort is normal.      Breath sounds: Normal breath sounds.   Abdominal:      General: Bowel sounds are normal.      Palpations: Abdomen is soft.   Musculoskeletal:         General: Normal range of motion.      Cervical back: Normal range of motion and neck supple.   Skin:     General: Skin is warm and dry.   Neurological:      Mental Status: She is alert and oriented to person, place, and time.       Significant Labs: All pertinent lab results from the last 24 hours have been reviewed.    Significant Imaging: Echocardiogram: Transthoracic echo (TTE) complete (Cupid Only):   Results for orders placed or performed during the hospital encounter of 09/14/22   Echo   Result Value Ref Range    LV LATERAL E/E' RATIO 7.56 m/s    LA WIDTH 3.70 cm    IVC diameter 1.29 cm    Left Ventricular Outflow Tract Mean Velocity 0.93 cm/s    Left Ventricular Outflow Tract Mean Gradient 3.81 mmHg    TDI LATERAL 0.09 m/s    PV PEAK VELOCITY 0.95 cm/s    LVIDd 4.09 3.5 - 6.0 cm    IVS 1.27 (A) 0.6 - 1.1 cm    Posterior Wall 1.30 (A) 0.6 - 1.1 cm    LVIDs 2.65 2.1 - 4.0 cm    FS 35 28 - 44 %    LA volume 51.30 cm3    Sinus 2.84 cm    STJ 2.34 cm    Ascending  aorta 2.91 cm    LV mass 189.46 g    LA size 4.17 cm    RVDD 3.67 cm    TAPSE 2.14 cm    Left Ventricle Relative Wall Thickness 0.64 cm    AV mean gradient 6 mmHg    AV valve area 3.01 cm2    AV Velocity Ratio 0.70     AV index (prosthetic) 0.90     MV valve area p 1/2 method 2.67 cm2    E/A ratio 0.88     E wave deceleration time 283.67 msec    IVRT 110.37 msec    LVOT diameter 2.06 cm    LVOT area 3.3 cm2    LVOT peak mahad 1.19 m/s    LVOT peak VTI 28.00 cm    Ao peak mahad 1.70 m/s    Ao VTI 31.0 cm    LVOT stroke volume 93.27 cm3    AV peak gradient 12 mmHg    TV peak gradient 0.83 mmHg    MV Peak E Mahad 0.68 m/s    TR Max Mahad 2.72 m/s    MV stenosis pressure 1/2 time 82.26 ms    MV Peak A Mahad 0.77 m/s    LV Systolic Volume 25.75 mL    LV Diastolic Volume 73.78 mL    RA Major Axis 5.02 cm    Left Atrium Minor Axis 3.66 cm    Left Atrium Major Axis 4.20 cm    Triscuspid Valve Regurgitation Peak Gradient 30 mmHg    RA Width 3.14 cm    Right Atrial Pressure (from IVC) 3 mmHg    EF 70 %    TV rest pulmonary artery pressure 33 mmHg    Narrative    · The left ventricle is normal in size with concentric hypertrophy and   normal systolic function.  · The estimated ejection fraction is 70%.  · Normal left ventricular diastolic function.  · Normal right ventricular size with normal right ventricular systolic   function.  · Mild left atrial enlargement.  · Normal central venous pressure (3 mmHg).  · The estimated PA systolic pressure is 33 mmHg.        Assessment and Plan:     * Chest pain  Atypical. No MI. EF 70% on echo. Stress test today - ok for d/c if negative for ischemia    HLD (hyperlipidemia)  On statin    DM (diabetes mellitus)  Per primary    HTN (hypertension)  Elevated initially - now controlled        VTE Risk Mitigation (From admission, onward)         Ordered     enoxaparin injection 40 mg  Daily         09/14/22 8923                Thank you for your consult. I will follow-up with patient. Please contact us if  you have any additional questions.    Kendrick Hayward MD  Cardiology   [facility]

## 2022-09-14 NOTE — HPI
HPI: 77 year old female  presents with arm pain and chest pain.  Patient states that her arm pain is chronic but acutely worsened today, starts at the neck and radiates down her right arm to her hand, he has 9/10 intensity currently, aggravated with movement of the arm, no relieving factors, and associated with decreased range of motion secondary to pain.  Patient reports a chronic history of neck and arm pain, though today it is worse without new injury/trauma.  She took Tylenol and an arthritis medication without relief.  She also reports an episode of substernal chest pressure around 8:00 p.m. that lasted about 30 minutes and was associated with mild shortness of breath.  It was nonexertional.  ROS otherwise negative.Denies tobacco use and alcohol use. PMHx of HTN and DM     Currently denies CP or SOB  EKG NSR - ok  Troponin negative x 2  BNP 48    Echo 9/14/22  The left ventricle is normal in size with concentric hypertrophy and normal systolic function.  The estimated ejection fraction is 70%.  Normal left ventricular diastolic function.  Normal right ventricular size with normal right ventricular systolic function.  Mild left atrial enlargement.  Normal central venous pressure (3 mmHg).  The estimated PA systolic pressure is 33 mmHg.

## 2022-09-14 NOTE — PLAN OF CARE
09/14/22 1502   Discharge Planning   Assessment Type Discharge Planning Brief Assessment   Resource/Environmental Concerns none   Support Systems Spouse/significant other   Equipment Currently Used at Home none   Current Living Arrangements home/apartment/condo   Patient/Family Anticipates Transition to home with family   Patient/Family Anticipated Services at Transition none   DME Needed Upon Discharge  none   Discharge Plan A Home with family  (with instructions follow up)

## 2022-09-14 NOTE — ASSESSMENT & PLAN NOTE
C/o chronic right shoulder pain and right arm - since May   Pain most likely 2/2 OA  X-ray:  No definite radiographic evidence of acute displaced fracture or dislocation of the right shoulder.  Moderate osteoarthrosis.  Pain control with NSAIDs and opioids  Given steroid IV in the ED

## 2022-09-14 NOTE — PROGRESS NOTES
[facility] Medicine  Progress Note    Patient Name: Sussy Fulton  MRN: [id number]  Patient Class: OP- Observation   Admission Date: 9/14/2022  Length of Stay: 0 days  Attending Physician: Carmela Johnson MD  Primary Care Provider: Arlen Rivera MD        Subjective:     Principal Problem:Chest pain        HPI:  77 year old female  presents with arm pain and chest pain.  Patient states that her arm pain is chronic but acutely worsened today, starts at the neck and radiates down her right arm to her hand, he has 9/10 intensity currently, aggravated with movement of the arm, no relieving factors, and associated with decreased range of motion secondary to pain.  Patient reports a chronic history of neck and arm pain, though today it is worse without new injury/trauma.  She took Tylenol and an arthritis medication without relief.  She also reports an episode of substernal chest pressure around 8:00 p.m. that lasted about 30 minutes and was associated with mild shortness of breath.  It was nonexertional.  ROS otherwise negative.Denies tobacco use and alcohol use. PMHx of HTN and DM      Overview/Hospital Course:  Patient presented with neck, arm and chest pain that is chronic. Chest pain workup was negative - troponin WNL X2, TTE grossly normal, and stress test unremarkable for any cardiac ischemia. Patient was discharged home and advised to follow up with her PCP for further mgt      Interval History: no acute events overnight. Patient states right shoulder pain is much improved    Review of Systems  Objective:     Vital Signs (Most Recent):  Temp: 98.2 °F (36.8 °C) (09/14/22 1604)  Pulse: 78 (09/14/22 1604)  Resp: 16 (09/14/22 1604)  BP: (!) 113/59 (09/14/22 1604)  SpO2: 99 % (09/14/22 1604)   Vital Signs (24h Range):  Temp:  [98.1 °F (36.7 °C)-98.2 °F (36.8 °C)] 98.2 °F (36.8 °C)  Pulse:  [71-85] 78  Resp:  [15-24] 16  SpO2:  [95 %-100 %] 99 %  BP: (113-235)/() 113/59     Weight:  81.6 kg (180 lb)  Body mass index is 29.05 kg/m².    Intake/Output Summary (Last 24 hours) at 9/14/2022 1656  Last data filed at 9/14/2022 1330  Gross per 24 hour   Intake 240 ml   Output --   Net 240 ml      Physical Exam  Constitutional:       General: She is not in acute distress.     Appearance: She is not ill-appearing.      Comments: Elderly AA f laying in bed in NAD   HENT:      Head: Normocephalic and atraumatic.   Eyes:      Extraocular Movements: Extraocular movements intact.      Conjunctiva/sclera: Conjunctivae normal.      Pupils: Pupils are equal, round, and reactive to light.   Cardiovascular:      Rate and Rhythm: Normal rate and regular rhythm.      Pulses: Normal pulses.      Heart sounds: Normal heart sounds. No murmur heard.    No friction rub. No gallop.   Pulmonary:      Effort: Pulmonary effort is normal. No respiratory distress.      Breath sounds: Normal breath sounds. No stridor. No wheezing, rhonchi or rales.   Chest:      Chest wall: No tenderness.   Abdominal:      General: Abdomen is flat. Bowel sounds are normal.      Palpations: Abdomen is soft.   Musculoskeletal:      Comments: Right shoulder joint tenderness with limited ROM due to pain otherwise no shoulder swelling, erythema or warmth   Skin:     Capillary Refill: Capillary refill takes less than 2 seconds.   Neurological:      General: No focal deficit present.      Mental Status: She is oriented to person, place, and time. Mental status is at baseline.   Psychiatric:         Mood and Affect: Mood normal.         Behavior: Behavior normal.         Thought Content: Thought content normal.         Judgment: Judgment normal.       Significant Labs: All pertinent labs within the past 24 hours have been reviewed.    Significant Imaging: I have reviewed all pertinent imaging results/findings within the past 24 hours.      Assessment/Plan:      * Chest pain  Atypical in nature, workup grossly normal for cardiac etiology  PPI at  home      HLD (hyperlipidemia)  Resume statin (patient doesn't know the dose)\  Lipid panel pending       Right shoulder pain  C/o chronic right shoulder pain and right arm - since May   Pain most likely 2/2 OA  X-ray:  No definite radiographic evidence of acute displaced fracture or dislocation of the right shoulder.  Moderate osteoarthrosis.  Pain control with NSAIDs and opioids  Given steroid IV in the ED       DM (diabetes mellitus)  DM with hyperglycemia - her hyperglycemia seems worsened from IV steroid given in ER  Will keep her overnight for better glycemic control and plan for discharge tomorrow    HTN (hypertension)  BP uncontrolled  Resume home meds  Hydralazine prn         VTE Risk Mitigation (From admission, onward)         Ordered     enoxaparin injection 40 mg  Daily         09/14/22 0596                Discharge Planning   PHYLICIA:      Code Status: Full Code   Is the patient medically ready for discharge?:     Reason for patient still in hospital (select all that apply): Treatment and Pending disposition  Discharge Plan A: Home with family (with instructions follow up)                  Carmela Johnson MD  Department of Hospital Medicine   [facility] [place] - Med Surg

## 2022-09-14 NOTE — ED NOTES
Pt sitting in bed with no new c/o pain at this time; pt to be admitted to observation and awaiting room assignment; will continue to monitor pt

## 2022-09-14 NOTE — ASSESSMENT & PLAN NOTE
Patient's FSGs are uncontrolled due to hyperglycemia on current medication regimen.  Last A1c reviewed- No results found for: LABA1C, HGBA1C  Most recent fingerstick glucose reviewed- Recent Labs   Lab 09/14/22  0403   POCTGLUCOSE 189*     Current correctional scale  Low  Maintain anti-hyperglycemic dose as follows-   Antihyperglycemics (From admission, onward)    Start     Stop Route Frequency Ordered    09/14/22 0523  insulin aspart U-100 pen 0-5 Units         -- SubQ Every 6 hours PRN 09/14/22 0423        Hold Oral hypoglycemics while patient is in the hospital.

## 2022-09-14 NOTE — SUBJECTIVE & OBJECTIVE
Past Medical History:   Diagnosis Date    Diabetes mellitus     Hypertension        No past surgical history on file.    Review of patient's allergies indicates:  No Known Allergies    No current facility-administered medications on file prior to encounter.     Current Outpatient Medications on File Prior to Encounter   Medication Sig    amoxicillin-clavulanate 875-125mg (AUGMENTIN) 875-125 mg per tablet Take 1 tablet by mouth 2 (two) times daily.    atorvastatin (LIPITOR) 10 MG tablet Take 10 mg by mouth once daily. Patient doesn't know the dose    HYDROcodone-acetaminophen (NORCO) 5-325 mg per tablet Take 1 tablet by mouth every 6 (six) hours as needed.    lisinopriL (PRINIVIL,ZESTRIL) 20 MG tablet Take 20 mg by mouth 2 (two) times a day.    metFORMIN (GLUCOPHAGE) 500 MG tablet Take 500 mg by mouth 2 (two) times daily with meals.    metoprolol succinate (TOPROL-XL) 200 MG 24 hr tablet Take 200 mg by mouth once daily.     Family History    None       Tobacco Use    Smoking status: Never    Smokeless tobacco: Never   Substance and Sexual Activity    Alcohol use: Not Currently    Drug use: Not on file    Sexual activity: Not on file     Review of Systems   Constitutional: Negative for decreased appetite.   HENT:  Negative for ear discharge.    Eyes:  Negative for blurred vision.   Respiratory:  Negative for hemoptysis.    Endocrine: Negative for polyphagia.   Hematologic/Lymphatic: Negative for adenopathy.   Skin:  Negative for color change.   Musculoskeletal:  Negative for joint swelling.   Genitourinary:  Negative for bladder incontinence.   Neurological:  Negative for brief paralysis.   Psychiatric/Behavioral:  Negative for hallucinations.    Allergic/Immunologic: Negative for hives.   Objective:     Vital Signs (Most Recent):  Temp: 98.1 °F (36.7 °C) (09/14/22 0218)  Pulse: 84 (09/14/22 0618)  Resp: 15 (09/14/22 0618)  BP: 133/62 (09/14/22 0618)  SpO2: 95 % (09/14/22 0618) Vital Signs (24h Range):  Temp:  [98.1  °F (36.7 °C)] 98.1 °F (36.7 °C)  Pulse:  [71-85] 84  Resp:  [15-24] 15  SpO2:  [95 %-100 %] 95 %  BP: (133-235)/() 133/62     Weight: 81.6 kg (180 lb)  Body mass index is 29.05 kg/m².    SpO2: 95 %  O2 Device (Oxygen Therapy): room air      Intake/Output Summary (Last 24 hours) at 9/14/2022 1026  Last data filed at 9/14/2022 0830  Gross per 24 hour   Intake 0 ml   Output --   Net 0 ml       Lines/Drains/Airways       Peripheral Intravenous Line  Duration                  Peripheral IV - Single Lumen 09/14/22 0315 20 G Posterior;Left Hand <1 day                    Physical Exam  Constitutional:       Appearance: She is well-developed.   HENT:      Head: Normocephalic and atraumatic.   Eyes:      Conjunctiva/sclera: Conjunctivae normal.      Pupils: Pupils are equal, round, and reactive to light.   Cardiovascular:      Rate and Rhythm: Normal rate.      Pulses: Intact distal pulses.      Heart sounds: Normal heart sounds.   Pulmonary:      Effort: Pulmonary effort is normal.      Breath sounds: Normal breath sounds.   Abdominal:      General: Bowel sounds are normal.      Palpations: Abdomen is soft.   Musculoskeletal:         General: Normal range of motion.      Cervical back: Normal range of motion and neck supple.   Skin:     General: Skin is warm and dry.   Neurological:      Mental Status: She is alert and oriented to person, place, and time.       Significant Labs: All pertinent lab results from the last 24 hours have been reviewed.    Significant Imaging: Echocardiogram: Transthoracic echo (TTE) complete (Cupid Only):   Results for orders placed or performed during the hospital encounter of 09/14/22   Echo   Result Value Ref Range    LV LATERAL E/E' RATIO 7.56 m/s    LA WIDTH 3.70 cm    IVC diameter 1.29 cm    Left Ventricular Outflow Tract Mean Velocity 0.93 cm/s    Left Ventricular Outflow Tract Mean Gradient 3.81 mmHg    TDI LATERAL 0.09 m/s    PV PEAK VELOCITY 0.95 cm/s    LVIDd 4.09 3.5 - 6.0 cm     IVS 1.27 (A) 0.6 - 1.1 cm    Posterior Wall 1.30 (A) 0.6 - 1.1 cm    LVIDs 2.65 2.1 - 4.0 cm    FS 35 28 - 44 %    LA volume 51.30 cm3    Sinus 2.84 cm    STJ 2.34 cm    Ascending aorta 2.91 cm    LV mass 189.46 g    LA size 4.17 cm    RVDD 3.67 cm    TAPSE 2.14 cm    Left Ventricle Relative Wall Thickness 0.64 cm    AV mean gradient 6 mmHg    AV valve area 3.01 cm2    AV Velocity Ratio 0.70     AV index (prosthetic) 0.90     MV valve area p 1/2 method 2.67 cm2    E/A ratio 0.88     E wave deceleration time 283.67 msec    IVRT 110.37 msec    LVOT diameter 2.06 cm    LVOT area 3.3 cm2    LVOT peak mahad 1.19 m/s    LVOT peak VTI 28.00 cm    Ao peak mahad 1.70 m/s    Ao VTI 31.0 cm    LVOT stroke volume 93.27 cm3    AV peak gradient 12 mmHg    TV peak gradient 0.83 mmHg    MV Peak E Mahad 0.68 m/s    TR Max Mahad 2.72 m/s    MV stenosis pressure 1/2 time 82.26 ms    MV Peak A Mahad 0.77 m/s    LV Systolic Volume 25.75 mL    LV Diastolic Volume 73.78 mL    RA Major Axis 5.02 cm    Left Atrium Minor Axis 3.66 cm    Left Atrium Major Axis 4.20 cm    Triscuspid Valve Regurgitation Peak Gradient 30 mmHg    RA Width 3.14 cm    Right Atrial Pressure (from IVC) 3 mmHg    EF 70 %    TV rest pulmonary artery pressure 33 mmHg    Narrative    · The left ventricle is normal in size with concentric hypertrophy and   normal systolic function.  · The estimated ejection fraction is 70%.  · Normal left ventricular diastolic function.  · Normal right ventricular size with normal right ventricular systolic   function.  · Mild left atrial enlargement.  · Normal central venous pressure (3 mmHg).  · The estimated PA systolic pressure is 33 mmHg.

## 2022-09-14 NOTE — SUBJECTIVE & OBJECTIVE
Interval History: no acute events overnight. Patient states right shoulder pain is much improved    Review of Systems  Objective:     Vital Signs (Most Recent):  Temp: 98.2 °F (36.8 °C) (09/14/22 1604)  Pulse: 78 (09/14/22 1604)  Resp: 16 (09/14/22 1604)  BP: (!) 113/59 (09/14/22 1604)  SpO2: 99 % (09/14/22 1604)   Vital Signs (24h Range):  Temp:  [98.1 °F (36.7 °C)-98.2 °F (36.8 °C)] 98.2 °F (36.8 °C)  Pulse:  [71-85] 78  Resp:  [15-24] 16  SpO2:  [95 %-100 %] 99 %  BP: (113-235)/() 113/59     Weight: 81.6 kg (180 lb)  Body mass index is 29.05 kg/m².    Intake/Output Summary (Last 24 hours) at 9/14/2022 1656  Last data filed at 9/14/2022 1330  Gross per 24 hour   Intake 240 ml   Output --   Net 240 ml      Physical Exam  Constitutional:       General: She is not in acute distress.     Appearance: She is not ill-appearing.      Comments: Elderly AA f laying in bed in NAD   HENT:      Head: Normocephalic and atraumatic.   Eyes:      Extraocular Movements: Extraocular movements intact.      Conjunctiva/sclera: Conjunctivae normal.      Pupils: Pupils are equal, round, and reactive to light.   Cardiovascular:      Rate and Rhythm: Normal rate and regular rhythm.      Pulses: Normal pulses.      Heart sounds: Normal heart sounds. No murmur heard.    No friction rub. No gallop.   Pulmonary:      Effort: Pulmonary effort is normal. No respiratory distress.      Breath sounds: Normal breath sounds. No stridor. No wheezing, rhonchi or rales.   Chest:      Chest wall: No tenderness.   Abdominal:      General: Abdomen is flat. Bowel sounds are normal.      Palpations: Abdomen is soft.   Musculoskeletal:      Comments: Right shoulder joint tenderness with limited ROM due to pain otherwise no shoulder swelling, erythema or warmth   Skin:     Capillary Refill: Capillary refill takes less than 2 seconds.   Neurological:      General: No focal deficit present.      Mental Status: She is oriented to person, place, and time.  Mental status is at baseline.   Psychiatric:         Mood and Affect: Mood normal.         Behavior: Behavior normal.         Thought Content: Thought content normal.         Judgment: Judgment normal.       Significant Labs: All pertinent labs within the past 24 hours have been reviewed.    Significant Imaging: I have reviewed all pertinent imaging results/findings within the past 24 hours.

## 2022-09-14 NOTE — HPI
77 year old female  presents with arm pain and chest pain.  Patient states that her arm pain is chronic but acutely worsened today, starts at the neck and radiates down her right arm to her hand, he has 9/10 intensity currently, aggravated with movement of the arm, no relieving factors, and associated with decreased range of motion secondary to pain.  Patient reports a chronic history of neck and arm pain, though today it is worse without new injury/trauma.  She took Tylenol and an arthritis medication without relief.  She also reports an episode of substernal chest pressure around 8:00 p.m. that lasted about 30 minutes and was associated with mild shortness of breath.  It was nonexertional.  ROS otherwise negative.Denies tobacco use and alcohol use. PMHx of HTN and DM

## 2022-09-14 NOTE — ASSESSMENT & PLAN NOTE
DM with hyperglycemia - her hyperglycemia seems worsened from IV steroid given in ER  Will keep her overnight for better glycemic control and plan for discharge tomorrow

## 2022-09-14 NOTE — ASSESSMENT & PLAN NOTE
C/o neck pain and right arm  X-ray:  No definite radiographic evidence of acute displaced fracture or dislocation of the right shoulder.  Moderate osteoarthrosis.  Pain control   Given steroid IV in the ED

## 2022-09-14 NOTE — DISCHARGE SUMMARY
[facility]  Discharge Summary      Patient Name: Sussy Fulton  MRN: [id number]  Patient Class: OP- Observation  Admission Date: 9/14/2022  Hospital Length of Stay: 0 days  Discharge Date and Time:  09/14/2022 3:45 PM  Attending Physician: Carmela Johnson MD   Discharging Provider: Carmela Johnson MD  Primary Care Provider: Arlen Rivera MD      HPI:   77 year old female  presents with arm pain and chest pain.  Patient states that her arm pain is chronic but acutely worsened today, starts at the neck and radiates down her right arm to her hand, he has 9/10 intensity currently, aggravated with movement of the arm, no relieving factors, and associated with decreased range of motion secondary to pain.  Patient reports a chronic history of neck and arm pain, though today it is worse without new injury/trauma.  She took Tylenol and an arthritis medication without relief.  She also reports an episode of substernal chest pressure around 8:00 p.m. that lasted about 30 minutes and was associated with mild shortness of breath.  It was nonexertional.  ROS otherwise negative.Denies tobacco use and alcohol use. PMHx of HTN and DM      * No surgery found *      Hospital Course:   Patient presented with neck, arm and chest pain that is chronic. Chest pain workup was negative - troponin WNL X2, TTE grossly normal, and stress test unremarkable for any cardiac ischemia. Patient was discharged home and advised to follow up with her PCP for further mgt       Goals of Care Treatment Preferences:  Code Status: Full Code      Consults:   Consults (From admission, onward)        Status Ordering Provider     Inpatient consult to Cardiology  Once        Provider:  Geo Ackerman MD    Acknowledged KEYANA MANZANARES          * Chest pain  Atypical in nature, workup grossly normal for cardiac etiology  PPI at home      HLD (hyperlipidemia)  Resume statin (patient doesn't know the  dose)\  Lipid panel pending       Acute pain  C/o neck pain and right arm  X-ray:  No definite radiographic evidence of acute displaced fracture or dislocation of the right shoulder.  Moderate osteoarthrosis.  Pain control   Given steroid IV in the ED       DM (diabetes mellitus)  Patient's FSGs are uncontrolled due to hyperglycemia on current medication regimen.  Last A1c reviewed-   Lab Results   Component Value Date    HGBA1C 9.2 (H) 09/14/2022     Most recent fingerstick glucose reviewed-   Recent Labs   Lab 09/14/22  0403 09/14/22  1142   POCTGLUCOSE 189* 341*     Current correctional scale  Low  Maintain anti-hyperglycemic dose as follows-   Antihyperglycemics (From admission, onward)    Start     Stop Route Frequency Ordered    09/14/22 0523  insulin aspart U-100 pen 0-5 Units         -- SubQ Every 6 hours PRN 09/14/22 0423        Hold Oral hypoglycemics while patient is in the hospital.    HTN (hypertension)  BP uncontrolled  Resume home meds  Hydralazine prn         Final Active Diagnoses:    Diagnosis Date Noted POA    PRINCIPAL PROBLEM:  Chest pain [R07.9] 09/14/2022 Unknown    HTN (hypertension) [I10] 09/14/2022 Unknown    DM (diabetes mellitus) [E11.9] 09/14/2022 Unknown    Acute pain [R52] 09/14/2022 Unknown    HLD (hyperlipidemia) [E78.5] 09/14/2022 Unknown      Problems Resolved During this Admission:       Discharged Condition: stable    Disposition:     Follow Up:    Patient Instructions:   No discharge procedures on file.    Significant Diagnostic Studies: Labs:   BMP:   Recent Labs   Lab 09/14/22  0257   *      K 3.7      CO2 25   BUN 21   CREATININE 0.9   CALCIUM 10.3   MG 1.3*    and CMP   Recent Labs   Lab 09/14/22  0257      K 3.7      CO2 25   *   BUN 21   CREATININE 0.9   CALCIUM 10.3   PROT 7.4   ALBUMIN 3.6   BILITOT 0.3   ALKPHOS 72   AST 14   ALT 9*   ANIONGAP 11       Pending Diagnostic Studies:     None         Medications:  Reconciled Home  Medications:      Medication List      ASK your doctor about these medications    amoxicillin-clavulanate 875-125mg 875-125 mg per tablet  Commonly known as: AUGMENTIN  Take 1 tablet by mouth 2 (two) times daily.     atorvastatin 10 MG tablet  Commonly known as: LIPITOR  Take 10 mg by mouth once daily. Patient doesn't know the dose     cetirizine 10 MG tablet  Commonly known as: ZYRTEC  Take 10 mg by mouth once daily.     HYDROcodone-acetaminophen 5-325 mg per tablet  Commonly known as: NORCO  Take 1 tablet by mouth every 6 (six) hours as needed.     lisinopriL 20 MG tablet  Commonly known as: PRINIVIL,ZESTRIL  Take 20 mg by mouth 2 (two) times a day.     metFORMIN 500 MG tablet  Commonly known as: GLUCOPHAGE  Take 500 mg by mouth 2 (two) times daily with meals.     metoprolol succinate 200 MG 24 hr tablet  Commonly known as: TOPROL-XL  Take 200 mg by mouth once daily.     omeprazole 20 MG capsule  Commonly known as: PRILOSEC  Take 20 mg by mouth once daily.            Indwelling Lines/Drains at time of discharge:   Lines/Drains/Airways     None                 Time spent on the discharge of patient: 30 minutes         Carmela Johnson MD  Department of Hospital Medicine  [facility]

## 2022-09-14 NOTE — ED PROVIDER NOTES
Encounter Date: 9/14/2022       History     Chief Complaint   Patient presents with    Arm Pain     R arm pain and stiffness, that started yesterday morning. Reports last night she started having palpitations and chest pain, denies SOB, n/v.      76 yo female with PMH of HTN and DM presents with arm pain and chest pain.  Patient states that her arm pain is chronic but acutely worsened today, starts at the neck and radiates down her right arm to her hand, he has 9/10 intensity currently, aggravated with movement of the arm, no relieving factors, and associated with decreased range of motion secondary to pain.  Patient reports a chronic history of neck and arm pain, though today it is worse without new injury/trauma.  She took Tylenol and an arthritis medication without relief.  She also reports an episode of substernal chest pressure around 8:00 p.m. that lasted about 30 minutes and was associated with mild shortness of breath.  It was nonexertional.  ROS otherwise negative. She reports compliance with BP medications; states she takes metoprolol and lisinopril. Denies tobacco use and alcohol use.     The history is provided by the patient and medical records.   Review of patient's allergies indicates:  No Known Allergies  Past Medical History:   Diagnosis Date    Diabetes mellitus     Hypertension      No past surgical history on file.  No family history on file.  Social History     Tobacco Use    Smoking status: Never    Smokeless tobacco: Never   Substance Use Topics    Alcohol use: Not Currently     Review of Systems   Constitutional:  Negative for chills and fever.   HENT:  Negative for congestion and sinus pain.    Eyes:  Negative for pain and visual disturbance.   Respiratory:  Positive for shortness of breath. Negative for cough.    Cardiovascular:  Positive for chest pain. Negative for leg swelling.   Gastrointestinal:  Negative for abdominal pain, constipation, diarrhea, nausea and vomiting.   Endocrine:  Negative for polydipsia and polyuria.   Genitourinary:  Negative for dysuria and hematuria.   Musculoskeletal:  Positive for neck pain. Negative for arthralgias and back pain.   Skin:  Negative for color change and rash.   Neurological:  Negative for dizziness, seizures, syncope, weakness, light-headedness and headaches.     Physical Exam     Initial Vitals [09/14/22 0218]   BP Pulse Resp Temp SpO2   (!) 233/105 71 16 98.1 °F (36.7 °C) 100 %      MAP       --         Physical Exam    Nursing note and vitals reviewed.  Constitutional: She appears well-developed and well-nourished. She is not diaphoretic. No distress.   HENT:   Head: Normocephalic and atraumatic.   Eyes: Conjunctivae and EOM are normal. Pupils are equal, round, and reactive to light.   Neck: Neck supple. No JVD present.   No cervical spine tenderness to palpation   Normal range of motion.  Cardiovascular:  Normal rate, regular rhythm, normal heart sounds and intact distal pulses.           No murmur heard.  Pulmonary/Chest: Breath sounds normal. No respiratory distress. She has no wheezes. She has no rhonchi. She has no rales.   No increased WOB or tachypnea. CTAB   Abdominal: Abdomen is soft. She exhibits no distension. There is no abdominal tenderness. There is no rebound and no guarding.   Musculoskeletal:         General: No edema.      Cervical back: Normal range of motion and neck supple.      Comments: Mild TTP of trapezius on right.   Neck: neck/right arm pain exacerbated by movement of the neck      Right Upper Extremity    - Skin intact, no deformity, no ecchymoses, no edema  - TTP over trapezius  - Compartments soft and compressible  - ROM full (elbow/wrist)  - Decreased shoulder ROM 2/2 pain. Able to abduct partially and flex but painful.   - SILT M/R/U  - Motor intact Ain/PIN/U/M  - Brisk cap refill  - Warm well perfused extremities  - 2+ Radial palpable         Neurological: She is alert and oriented to person, place, and time. She has  "normal strength. No sensory deficit.   Skin: Skin is warm and dry. Capillary refill takes less than 2 seconds.       ED Course   Procedures  Labs Reviewed   CBC W/ AUTO DIFFERENTIAL - Abnormal; Notable for the following components:       Result Value    Mono # 1.1 (*)     All other components within normal limits   COMPREHENSIVE METABOLIC PANEL - Abnormal; Notable for the following components:    Glucose 194 (*)     ALT 9 (*)     All other components within normal limits   POCT GLUCOSE - Abnormal; Notable for the following components:    POCT Glucose 189 (*)     All other components within normal limits   TROPONIN I   B-TYPE NATRIURETIC PEPTIDE   SARS-COV-2 RDRP GENE   POCT GLUCOSE MONITORING CONTINUOUS     EKG Readings: (Independently Interpreted)   Normal sinus rhythm at a rate of 83, no STEMI, normal axis, normal T-wave morphology and ST segments     Imaging Results              X-Ray Chest AP Portable (Final result)  Result time 09/14/22 03:29:42      Final result by Levon Hathaway MD (09/14/22 03:29:42)                   Impression:      No acute cardiopulmonary finding identified on this single view.    Probable calcified pleural plaque overlying the right upper lung zone.      Electronically signed by: Levon Hathaway MD  Date:    09/14/2022  Time:    03:29               Narrative:    EXAMINATION:  XR CHEST AP PORTABLE    CLINICAL HISTORY:  Provided history is "Chest Pain;  ".    TECHNIQUE:  One view of the chest.    COMPARISON:  09/01/2019.    FINDINGS:  Cardiomediastinal silhouette is stable and not significantly enlarged.  Atherosclerotic calcifications overlie the aortic arch.  Stable calcified density area measuring approximately 1.6 cm in the right upper lung zone suggestive of calcified pleural plaque, similar to prior study.  No confluent area of consolidation.  No large pleural effusion.  No pneumothorax.  Degenerative changes noted involving both shoulders.                                       " X-Ray Shoulder Trauma Right (Final result)  Result time 09/14/22 03:27:19      Final result by Anurag Argueta MD (09/14/22 03:27:19)                   Impression:      No definite radiographic evidence of acute displaced fracture or dislocation of the right shoulder.  Moderate osteoarthrosis.      Electronically signed by: Anurag Argueta MD  Date:    09/14/2022  Time:    03:27               Narrative:    EXAMINATION:  XR SHOULDER TRAUMA 3 VIEW RIGHT    CLINICAL HISTORY:  Pain in right shoulder    TECHNIQUE:  Three or four views of the right shoulder were performed.    COMPARISON:  None    FINDINGS:  There is no radiographic evidence of acute displaced fracture.  Glenohumeral alignment appears appropriately maintained without evidence of dislocation.  There is moderate osteoarthrosis of the right glenohumeral and acromioclavicular joints.  No definite displaced right-sided rib fracture appreciated.  There is a 1.5 cm nodular opacity again identified projecting over the right midlung zone, similar to prior study of 09/01/2019.                                           Medications   dexamethasone injection 12 mg (12 mg Intravenous Given 9/14/22 0311)   morphine injection 6 mg (6 mg Intravenous Given 9/14/22 0330)   hydrALAZINE injection 10 mg (10 mg Intravenous Given 9/14/22 0324)   ketorolac injection 15 mg (15 mg Intravenous Given 9/14/22 0412)     Medical Decision Making:   History:   Old Medical Records: I decided to obtain old medical records.  Old Records Summarized: records from another hospital.       <> Summary of Records: MRI Cervical Spine in 2011:     IMPRESSION:   1. There are degenerative changes in the cervical spine. There is   straightening of the usual cervical lordotic curve with moderate   midcervical kyphosis.   2. There is spondylosis on posterior vertebral surfaces from C3 to C7   level, maximal at C5-C6 with moderate stenosis of the spinal canal at   this level.   3. There is no definite  cervical disc herniation seen.   4. The cervical spinal cord is normal in configuration and signal   intensity.     Initial Assessment:   77F w/HTN and DM presents with right shoulder/arm/neck pain and an episode of chest pain, hypertensive but hemodynamically stable  - one-trop cardiac workup (pain was 7 hours ago)  - Xray shoulder  - Dex for pain  Differential Diagnosis:   Cervical radiculopathy, osteoarthritis, ACS (STEMI, NSTEMI, unstable angina), pneumonia, pneumothorax, CHF  Clinical Tests:   Lab Tests: Ordered and Reviewed  Radiological Study: Ordered and Reviewed  Medical Tests: Ordered and Reviewed  ED Management:  See ED course           ED Course as of 09/14/22 0416   Wed Sep 14, 2022   0226 EKG: Rate 83, regular rhythm, sinus rhythm, MO interval 202, other intervals within normal limits, no ST elevations depressions noted, no previous to compare. [CC]   0306 CBC auto differential(!)  CBC unremarkable without leukocytosis, significant anemia, or decreased platelets   [BD]   0337 X-Ray Shoulder Trauma Right  Xray shoulder shows mild osteoarthritis without acute fracture/dislocation [BD]   0337 X-Ray Chest AP Portable  Chest x-ray shows no acute process such as pneumonia, pneumothorax, or pulmonary edema.    [BD]   0339 BNP: 48  CHF unlikely [BD]   0339 Comprehensive metabolic panel(!)  CMP unremarkable without significant electrolyte derangement, impaired renal function, or elevated LFTs   [BD]   0354 Patient presents to the emergency department for evaluation of right arm pain.  Patient also noted 1 episode of sharp chest pain earlier in the night that subsided quickly.  Patient arrives hypertensive but in pain.  She has limited active and passive range of motion of the right shoulder due to pain.  She has known osteoarthritis.  She sees and acupuncture is and undergoes multiple pain med allergies the outpatient setting.  She denies new trauma.  X-ray concerning for moderate osteoarthritis.  Clinically  patient does not appear to have dislocated shoulder.  Electrolytes within normal limits.  Doubt electrolyte derangement.  Patient afebrile, joint is not warm to touch, no overlying erythema or skin changes, no leukocytosis, doubt infectious etiology.  T patient received a dose of hydralazine, dexamethasone morphine.  With pain control patient's blood pressure on repeat is 168/82.  I do not believe patient is having ACS given nonischemic EKG, normal troponin in the time interval since chest pain started.    BNP is normal.  Doubt congestive heart failure or hypertensive emergency.  No pulmonary edema noted on chest x-ray.  Given patient's initial significant hypertension, chest pain that she states now has returned slightly in the emergency department I believe she would benefit from chest pain rule out given hypertensive urgency.  Plan to place in observation. [CC]   0356 Troponin I: <0.006  ACS less likely. Will place the patient in observation for chest pain rule out. BP is down to 168 systolic after hydralazine 10mg. She agrees with and is comfortable with the plan.  [BD]      ED Course User Index  [BD] Noam Nicholson MD  [CC] Ciro Calles MD                   Clinical Impression:   Final diagnoses:  [R07.9] Chest pain (Primary)  [M25.511] Shoulder pain, right  [M19.90] Osteoarthritis, unspecified osteoarthritis type, unspecified site  [I10] Hypertension, unspecified type      ED Disposition Condition    Observation Stable                Noam Nicholson MD  Resident  09/14/22 0416

## 2022-09-14 NOTE — HOSPITAL COURSE
Patient presented with neck, arm and chest pain that is chronic. Her main pain was in her right shoulder which she states is chronic but just worsened acutely over 24hrs prior to her coming to the ER. Chest pain workup was negative - troponin WNL X2, TTE grossly normal, and stress test unremarkable for any cardiac ischemia. Right shoulder XR show she has some significant OA. Patient was discharged home and advised to follow up with her PCP for further mgt. She was given a referral to see ortho outpatient

## 2022-09-15 VITALS
OXYGEN SATURATION: 96 % | HEIGHT: 66 IN | RESPIRATION RATE: 18 BRPM | DIASTOLIC BLOOD PRESSURE: 68 MMHG | SYSTOLIC BLOOD PRESSURE: 125 MMHG | WEIGHT: 180 LBS | BODY MASS INDEX: 28.93 KG/M2 | HEART RATE: 66 BPM | TEMPERATURE: 98 F

## 2022-09-15 LAB
POCT GLUCOSE: 196 MG/DL (ref 70–110)
POCT GLUCOSE: 208 MG/DL (ref 70–110)
POCT GLUCOSE: 215 MG/DL (ref 70–110)

## 2022-09-15 PROCEDURE — 25000003 PHARM REV CODE 250: Performed by: NURSE PRACTITIONER

## 2022-09-15 PROCEDURE — G0378 HOSPITAL OBSERVATION PER HR: HCPCS

## 2022-09-15 PROCEDURE — 25000003 PHARM REV CODE 250: Performed by: STUDENT IN AN ORGANIZED HEALTH CARE EDUCATION/TRAINING PROGRAM

## 2022-09-15 PROCEDURE — 94760 N-INVAS EAR/PLS OXIMETRY 1: CPT

## 2022-09-15 RX ORDER — DICLOFENAC SODIUM 10 MG/G
2 GEL TOPICAL DAILY
Qty: 100 G | Refills: 1 | Status: SHIPPED | OUTPATIENT
Start: 2022-09-15

## 2022-09-15 RX ADMIN — CETIRIZINE HYDROCHLORIDE 5 MG: 5 TABLET ORAL at 08:09

## 2022-09-15 RX ADMIN — METOPROLOL SUCCINATE 200 MG: 50 TABLET, EXTENDED RELEASE ORAL at 08:09

## 2022-09-15 RX ADMIN — NITROGLYCERIN 1 INCH: 20 OINTMENT TOPICAL at 04:09

## 2022-09-15 RX ADMIN — INSULIN ASPART 2 UNITS: 100 INJECTION, SOLUTION INTRAVENOUS; SUBCUTANEOUS at 06:09

## 2022-09-15 RX ADMIN — PANTOPRAZOLE SODIUM 40 MG: 40 TABLET, DELAYED RELEASE ORAL at 08:09

## 2022-09-15 RX ADMIN — INSULIN ASPART 2 UNITS: 100 INJECTION, SOLUTION INTRAVENOUS; SUBCUTANEOUS at 12:09

## 2022-09-15 RX ADMIN — LISINOPRIL 20 MG: 20 TABLET ORAL at 08:09

## 2022-09-15 RX ADMIN — ATORVASTATIN CALCIUM 20 MG: 10 TABLET, FILM COATED ORAL at 08:09

## 2022-09-15 NOTE — PROGRESS NOTES
Discharge paperwork given to pt. IV and tele monitor removed. All questions and concerns addressed. Waiting on family for .

## 2022-09-15 NOTE — ASSESSMENT & PLAN NOTE
DM with hyperglycemia - her hyperglycemia seems worsened from IV steroid given in ER  Now better controlled

## 2022-09-15 NOTE — PLAN OF CARE
09/15/22 1055   Final Note   Assessment Type Final Discharge Note   Anticipated Discharge Disposition Home   Hospital Resources/Appts/Education Provided Appointments scheduled and added to AVS   Post-Acute Status   Post-Acute Authorization Other   Other Status No Post-Acute Service Needs   Pts nurse Yennifer notified that the pt can d/c from CM standpoint

## 2024-06-07 ENCOUNTER — HOSPITAL ENCOUNTER (INPATIENT)
Facility: HOSPITAL | Age: 79
LOS: 10 days | Discharge: HOME HEALTH | DRG: 871 | End: 2024-06-17
Attending: EMERGENCY MEDICINE | Admitting: STUDENT IN AN ORGANIZED HEALTH CARE EDUCATION/TRAINING PROGRAM
Payer: MEDICARE

## 2024-06-07 DIAGNOSIS — R50.9 FEVER, UNSPECIFIED FEVER CAUSE: ICD-10-CM

## 2024-06-07 DIAGNOSIS — I33.0 INFECTIVE ENDOCARDITIS: ICD-10-CM

## 2024-06-07 DIAGNOSIS — F43.21 GRIEF REACTION: Primary | ICD-10-CM

## 2024-06-07 DIAGNOSIS — R41.82 ALTERED MENTAL STATUS, UNSPECIFIED ALTERED MENTAL STATUS TYPE: ICD-10-CM

## 2024-06-07 DIAGNOSIS — R51.9 HEADACHE IN FRONT OF HEAD: ICD-10-CM

## 2024-06-07 DIAGNOSIS — R53.1 GENERALIZED WEAKNESS: ICD-10-CM

## 2024-06-07 DIAGNOSIS — G04.90 MENINGOENCEPHALITIS: ICD-10-CM

## 2024-06-07 DIAGNOSIS — R07.9 CHEST PAIN: ICD-10-CM

## 2024-06-07 PROBLEM — R52 ACUTE PAIN: Status: RESOLVED | Noted: 2022-09-14 | Resolved: 2024-06-07

## 2024-06-07 LAB
ALBUMIN SERPL BCP-MCNC: 3.7 G/DL (ref 3.5–5.2)
ALP SERPL-CCNC: 70 U/L (ref 55–135)
ALT SERPL W/O P-5'-P-CCNC: 13 U/L (ref 10–44)
AMPHET+METHAMPHET UR QL: NEGATIVE
ANION GAP SERPL CALC-SCNC: 12 MMOL/L (ref 8–16)
AST SERPL-CCNC: 17 U/L (ref 10–40)
BACTERIA #/AREA URNS HPF: ABNORMAL /HPF
BARBITURATES UR QL SCN>200 NG/ML: NEGATIVE
BASOPHILS # BLD AUTO: 0.05 K/UL (ref 0–0.2)
BASOPHILS NFR BLD: 0.4 % (ref 0–1.9)
BENZODIAZ UR QL SCN>200 NG/ML: NEGATIVE
BILIRUB SERPL-MCNC: 0.7 MG/DL (ref 0.1–1)
BILIRUB UR QL STRIP: NEGATIVE
BUN SERPL-MCNC: 15 MG/DL (ref 8–23)
BZE UR QL SCN: NEGATIVE
CALCIUM SERPL-MCNC: 10.1 MG/DL (ref 8.7–10.5)
CANNABINOIDS UR QL SCN: NEGATIVE
CHLORIDE SERPL-SCNC: 101 MMOL/L (ref 95–110)
CLARITY UR: CLEAR
CO2 SERPL-SCNC: 25 MMOL/L (ref 23–29)
COLOR UR: YELLOW
CREAT SERPL-MCNC: 1.2 MG/DL (ref 0.5–1.4)
CREAT UR-MCNC: 165.8 MG/DL (ref 15–325)
CTP QC/QA: YES
CTP QC/QA: YES
DIFFERENTIAL METHOD BLD: ABNORMAL
EOSINOPHIL # BLD AUTO: 0 K/UL (ref 0–0.5)
EOSINOPHIL NFR BLD: 0.1 % (ref 0–8)
ERYTHROCYTE [DISTWIDTH] IN BLOOD BY AUTOMATED COUNT: 13.2 % (ref 11.5–14.5)
EST. GFR  (NO RACE VARIABLE): 47 ML/MIN/1.73 M^2
ETHANOL SERPL-MCNC: <10 MG/DL
GLUCOSE SERPL-MCNC: 194 MG/DL (ref 70–110)
GLUCOSE UR QL STRIP: ABNORMAL
HCT VFR BLD AUTO: 41.7 % (ref 37–48.5)
HGB BLD-MCNC: 13.6 G/DL (ref 12–16)
HGB UR QL STRIP: NEGATIVE
HYALINE CASTS #/AREA URNS LPF: 0 /LPF
IMM GRANULOCYTES # BLD AUTO: 0.03 K/UL (ref 0–0.04)
IMM GRANULOCYTES NFR BLD AUTO: 0.3 % (ref 0–0.5)
KETONES UR QL STRIP: NEGATIVE
LACTATE SERPL-SCNC: 0.8 MMOL/L (ref 0.5–2.2)
LACTATE SERPL-SCNC: 1 MMOL/L (ref 0.5–2.2)
LEUKOCYTE ESTERASE UR QL STRIP: NEGATIVE
LYMPHOCYTES # BLD AUTO: 1.9 K/UL (ref 1–4.8)
LYMPHOCYTES NFR BLD: 16.5 % (ref 18–48)
MAGNESIUM SERPL-MCNC: 1.2 MG/DL (ref 1.6–2.6)
MCH RBC QN AUTO: 27.8 PG (ref 27–31)
MCHC RBC AUTO-ENTMCNC: 32.6 G/DL (ref 32–36)
MCV RBC AUTO: 85 FL (ref 82–98)
METHADONE UR QL SCN>300 NG/ML: NEGATIVE
MICROSCOPIC COMMENT: ABNORMAL
MONOCYTES # BLD AUTO: 0.7 K/UL (ref 0.3–1)
MONOCYTES NFR BLD: 5.9 % (ref 4–15)
NEUTROPHILS # BLD AUTO: 8.6 K/UL (ref 1.8–7.7)
NEUTROPHILS NFR BLD: 76.8 % (ref 38–73)
NITRITE UR QL STRIP: NEGATIVE
NRBC BLD-RTO: 0 /100 WBC
OPIATES UR QL SCN: NEGATIVE
PCP UR QL SCN>25 NG/ML: NEGATIVE
PH UR STRIP: 7 [PH] (ref 5–8)
PLATELET # BLD AUTO: 223 K/UL (ref 150–450)
PMV BLD AUTO: 11 FL (ref 9.2–12.9)
POC MOLECULAR INFLUENZA A AGN: NEGATIVE
POC MOLECULAR INFLUENZA B AGN: NEGATIVE
POCT GLUCOSE: 105 MG/DL (ref 70–110)
POCT GLUCOSE: 110 MG/DL (ref 70–110)
POCT GLUCOSE: 136 MG/DL (ref 70–110)
POCT GLUCOSE: 209 MG/DL (ref 70–110)
POTASSIUM SERPL-SCNC: 4.1 MMOL/L (ref 3.5–5.1)
PROCALCITONIN SERPL IA-MCNC: 0.02 NG/ML
PROCALCITONIN SERPL IA-MCNC: 0.1 NG/ML
PROT SERPL-MCNC: 8.2 G/DL (ref 6–8.4)
PROT UR QL STRIP: ABNORMAL
RBC # BLD AUTO: 4.89 M/UL (ref 4–5.4)
RBC #/AREA URNS HPF: 5 /HPF (ref 0–4)
SARS-COV-2 RDRP RESP QL NAA+PROBE: NEGATIVE
SODIUM SERPL-SCNC: 138 MMOL/L (ref 136–145)
SP GR UR STRIP: 1.02 (ref 1–1.03)
T4 FREE SERPL-MCNC: 1.05 NG/DL (ref 0.71–1.51)
TOXICOLOGY INFORMATION: NORMAL
TROPONIN I SERPL DL<=0.01 NG/ML-MCNC: 0.01 NG/ML (ref 0–0.03)
TSH SERPL DL<=0.005 MIU/L-ACNC: 0.22 UIU/ML (ref 0.4–4)
URN SPEC COLLECT METH UR: ABNORMAL
UROBILINOGEN UR STRIP-ACNC: NEGATIVE EU/DL
WBC # BLD AUTO: 11.18 K/UL (ref 3.9–12.7)
WBC #/AREA URNS HPF: 0 /HPF (ref 0–5)

## 2024-06-07 PROCEDURE — 85025 COMPLETE CBC W/AUTO DIFF WBC: CPT | Performed by: EMERGENCY MEDICINE

## 2024-06-07 PROCEDURE — 83036 HEMOGLOBIN GLYCOSYLATED A1C: CPT | Performed by: INTERNAL MEDICINE

## 2024-06-07 PROCEDURE — 96365 THER/PROPH/DIAG IV INF INIT: CPT

## 2024-06-07 PROCEDURE — 87502 INFLUENZA DNA AMP PROBE: CPT

## 2024-06-07 PROCEDURE — 25000003 PHARM REV CODE 250: Performed by: INTERNAL MEDICINE

## 2024-06-07 PROCEDURE — 87635 SARS-COV-2 COVID-19 AMP PRB: CPT | Performed by: EMERGENCY MEDICINE

## 2024-06-07 PROCEDURE — 21400001 HC TELEMETRY ROOM

## 2024-06-07 PROCEDURE — 84145 PROCALCITONIN (PCT): CPT | Performed by: EMERGENCY MEDICINE

## 2024-06-07 PROCEDURE — 83735 ASSAY OF MAGNESIUM: CPT | Performed by: EMERGENCY MEDICINE

## 2024-06-07 PROCEDURE — 83605 ASSAY OF LACTIC ACID: CPT | Mod: 91 | Performed by: EMERGENCY MEDICINE

## 2024-06-07 PROCEDURE — 99285 EMERGENCY DEPT VISIT HI MDM: CPT | Mod: 25

## 2024-06-07 PROCEDURE — 25500020 PHARM REV CODE 255: Performed by: STUDENT IN AN ORGANIZED HEALTH CARE EDUCATION/TRAINING PROGRAM

## 2024-06-07 PROCEDURE — 84484 ASSAY OF TROPONIN QUANT: CPT | Performed by: EMERGENCY MEDICINE

## 2024-06-07 PROCEDURE — 84439 ASSAY OF FREE THYROXINE: CPT | Performed by: EMERGENCY MEDICINE

## 2024-06-07 PROCEDURE — 96367 TX/PROPH/DG ADDL SEQ IV INF: CPT

## 2024-06-07 PROCEDURE — 82077 ASSAY SPEC XCP UR&BREATH IA: CPT | Performed by: EMERGENCY MEDICINE

## 2024-06-07 PROCEDURE — 93010 ELECTROCARDIOGRAM REPORT: CPT | Mod: ,,, | Performed by: INTERNAL MEDICINE

## 2024-06-07 PROCEDURE — 87633 RESP VIRUS 12-25 TARGETS: CPT | Performed by: INTERNAL MEDICINE

## 2024-06-07 PROCEDURE — 96366 THER/PROPH/DIAG IV INF ADDON: CPT

## 2024-06-07 PROCEDURE — 84145 PROCALCITONIN (PCT): CPT | Mod: 91 | Performed by: STUDENT IN AN ORGANIZED HEALTH CARE EDUCATION/TRAINING PROGRAM

## 2024-06-07 PROCEDURE — 96361 HYDRATE IV INFUSION ADD-ON: CPT

## 2024-06-07 PROCEDURE — 81000 URINALYSIS NONAUTO W/SCOPE: CPT | Performed by: EMERGENCY MEDICINE

## 2024-06-07 PROCEDURE — 93005 ELECTROCARDIOGRAM TRACING: CPT

## 2024-06-07 PROCEDURE — 25000003 PHARM REV CODE 250: Performed by: EMERGENCY MEDICINE

## 2024-06-07 PROCEDURE — 82962 GLUCOSE BLOOD TEST: CPT

## 2024-06-07 PROCEDURE — 63600175 PHARM REV CODE 636 W HCPCS: Performed by: EMERGENCY MEDICINE

## 2024-06-07 PROCEDURE — 80053 COMPREHEN METABOLIC PANEL: CPT | Performed by: EMERGENCY MEDICINE

## 2024-06-07 PROCEDURE — 84443 ASSAY THYROID STIM HORMONE: CPT | Performed by: EMERGENCY MEDICINE

## 2024-06-07 PROCEDURE — 87040 BLOOD CULTURE FOR BACTERIA: CPT | Performed by: EMERGENCY MEDICINE

## 2024-06-07 PROCEDURE — 80307 DRUG TEST PRSMV CHEM ANLYZR: CPT | Performed by: EMERGENCY MEDICINE

## 2024-06-07 PROCEDURE — 63600175 PHARM REV CODE 636 W HCPCS: Performed by: INTERNAL MEDICINE

## 2024-06-07 RX ORDER — SIMETHICONE 80 MG
1 TABLET,CHEWABLE ORAL 4 TIMES DAILY PRN
Status: DISCONTINUED | OUTPATIENT
Start: 2024-06-07 | End: 2024-06-17 | Stop reason: HOSPADM

## 2024-06-07 RX ORDER — NALOXONE HCL 0.4 MG/ML
0.02 VIAL (ML) INJECTION
Status: DISCONTINUED | OUTPATIENT
Start: 2024-06-07 | End: 2024-06-17 | Stop reason: HOSPADM

## 2024-06-07 RX ORDER — METOPROLOL SUCCINATE 50 MG/1
200 TABLET, EXTENDED RELEASE ORAL DAILY
Status: DISCONTINUED | OUTPATIENT
Start: 2024-06-08 | End: 2024-06-08

## 2024-06-07 RX ORDER — MAGNESIUM SULFATE HEPTAHYDRATE 40 MG/ML
2 INJECTION, SOLUTION INTRAVENOUS ONCE
Status: COMPLETED | OUTPATIENT
Start: 2024-06-07 | End: 2024-06-07

## 2024-06-07 RX ORDER — ENOXAPARIN SODIUM 100 MG/ML
40 INJECTION SUBCUTANEOUS EVERY 24 HOURS
Status: DISCONTINUED | OUTPATIENT
Start: 2024-06-07 | End: 2024-06-14

## 2024-06-07 RX ORDER — LISINOPRIL 20 MG/1
20 TABLET ORAL 2 TIMES DAILY
Status: DISCONTINUED | OUTPATIENT
Start: 2024-06-07 | End: 2024-06-08

## 2024-06-07 RX ORDER — CETIRIZINE HYDROCHLORIDE 10 MG/1
10 TABLET ORAL DAILY
Status: DISCONTINUED | OUTPATIENT
Start: 2024-06-08 | End: 2024-06-08

## 2024-06-07 RX ORDER — GLUCAGON 1 MG
1 KIT INJECTION
Status: DISCONTINUED | OUTPATIENT
Start: 2024-06-07 | End: 2024-06-09

## 2024-06-07 RX ORDER — IBUPROFEN 200 MG
24 TABLET ORAL
Status: DISCONTINUED | OUTPATIENT
Start: 2024-06-07 | End: 2024-06-09

## 2024-06-07 RX ORDER — IBUPROFEN 200 MG
16 TABLET ORAL
Status: DISCONTINUED | OUTPATIENT
Start: 2024-06-07 | End: 2024-06-09

## 2024-06-07 RX ORDER — ACETAMINOPHEN 325 MG/1
650 TABLET ORAL EVERY 4 HOURS PRN
Status: DISCONTINUED | OUTPATIENT
Start: 2024-06-07 | End: 2024-06-17 | Stop reason: HOSPADM

## 2024-06-07 RX ORDER — IPRATROPIUM BROMIDE AND ALBUTEROL SULFATE 2.5; .5 MG/3ML; MG/3ML
3 SOLUTION RESPIRATORY (INHALATION) EVERY 4 HOURS PRN
Status: DISCONTINUED | OUTPATIENT
Start: 2024-06-07 | End: 2024-06-17 | Stop reason: HOSPADM

## 2024-06-07 RX ORDER — TALC
6 POWDER (GRAM) TOPICAL NIGHTLY PRN
Status: DISCONTINUED | OUTPATIENT
Start: 2024-06-07 | End: 2024-06-17 | Stop reason: HOSPADM

## 2024-06-07 RX ORDER — HYDROCODONE BITARTRATE AND ACETAMINOPHEN 5; 325 MG/1; MG/1
1 TABLET ORAL EVERY 8 HOURS PRN
Status: DISCONTINUED | OUTPATIENT
Start: 2024-06-07 | End: 2024-06-08

## 2024-06-07 RX ORDER — INSULIN ASPART 100 [IU]/ML
0-5 INJECTION, SOLUTION INTRAVENOUS; SUBCUTANEOUS
Status: DISCONTINUED | OUTPATIENT
Start: 2024-06-07 | End: 2024-06-09

## 2024-06-07 RX ORDER — ATORVASTATIN CALCIUM 10 MG/1
10 TABLET, FILM COATED ORAL NIGHTLY
Status: DISCONTINUED | OUTPATIENT
Start: 2024-06-07 | End: 2024-06-08

## 2024-06-07 RX ORDER — SODIUM CHLORIDE 0.9 % (FLUSH) 0.9 %
10 SYRINGE (ML) INJECTION EVERY 12 HOURS PRN
Status: DISCONTINUED | OUTPATIENT
Start: 2024-06-07 | End: 2024-06-17 | Stop reason: HOSPADM

## 2024-06-07 RX ORDER — ALUMINUM HYDROXIDE, MAGNESIUM HYDROXIDE, AND SIMETHICONE 1200; 120; 1200 MG/30ML; MG/30ML; MG/30ML
30 SUSPENSION ORAL 4 TIMES DAILY PRN
Status: DISCONTINUED | OUTPATIENT
Start: 2024-06-07 | End: 2024-06-17 | Stop reason: HOSPADM

## 2024-06-07 RX ORDER — ONDANSETRON HYDROCHLORIDE 2 MG/ML
4 INJECTION, SOLUTION INTRAVENOUS EVERY 6 HOURS PRN
Status: DISCONTINUED | OUTPATIENT
Start: 2024-06-07 | End: 2024-06-17 | Stop reason: HOSPADM

## 2024-06-07 RX ORDER — SODIUM CHLORIDE 9 MG/ML
INJECTION, SOLUTION INTRAVENOUS CONTINUOUS
Status: DISCONTINUED | OUTPATIENT
Start: 2024-06-07 | End: 2024-06-08

## 2024-06-07 RX ORDER — POLYETHYLENE GLYCOL 3350 17 G/17G
17 POWDER, FOR SOLUTION ORAL DAILY
Status: DISCONTINUED | OUTPATIENT
Start: 2024-06-08 | End: 2024-06-17 | Stop reason: HOSPADM

## 2024-06-07 RX ORDER — PANTOPRAZOLE SODIUM 40 MG/1
40 TABLET, DELAYED RELEASE ORAL DAILY
Status: DISCONTINUED | OUTPATIENT
Start: 2024-06-08 | End: 2024-06-08

## 2024-06-07 RX ORDER — IBUPROFEN 600 MG/1
600 TABLET ORAL
Status: COMPLETED | OUTPATIENT
Start: 2024-06-07 | End: 2024-06-07

## 2024-06-07 RX ORDER — ACETAMINOPHEN 500 MG
1000 TABLET ORAL
Status: COMPLETED | OUTPATIENT
Start: 2024-06-07 | End: 2024-06-07

## 2024-06-07 RX ORDER — HYDRALAZINE HYDROCHLORIDE 20 MG/ML
5 INJECTION INTRAMUSCULAR; INTRAVENOUS EVERY 6 HOURS PRN
Status: DISCONTINUED | OUTPATIENT
Start: 2024-06-07 | End: 2024-06-17 | Stop reason: HOSPADM

## 2024-06-07 RX ADMIN — ACETAMINOPHEN 650 MG: 325 TABLET ORAL at 08:06

## 2024-06-07 RX ADMIN — ENOXAPARIN SODIUM 40 MG: 40 INJECTION SUBCUTANEOUS at 08:06

## 2024-06-07 RX ADMIN — IBUPROFEN 600 MG: 600 TABLET ORAL at 05:06

## 2024-06-07 RX ADMIN — LISINOPRIL 20 MG: 20 TABLET ORAL at 08:06

## 2024-06-07 RX ADMIN — SODIUM CHLORIDE: 9 INJECTION, SOLUTION INTRAVENOUS at 10:06

## 2024-06-07 RX ADMIN — SODIUM CHLORIDE: 9 INJECTION, SOLUTION INTRAVENOUS at 09:06

## 2024-06-07 RX ADMIN — SODIUM CHLORIDE 1000 ML: 9 INJECTION, SOLUTION INTRAVENOUS at 11:06

## 2024-06-07 RX ADMIN — VANCOMYCIN HYDROCHLORIDE 1500 MG: 1.5 INJECTION, POWDER, LYOPHILIZED, FOR SOLUTION INTRAVENOUS at 06:06

## 2024-06-07 RX ADMIN — IOHEXOL 75 ML: 350 INJECTION, SOLUTION INTRAVENOUS at 08:06

## 2024-06-07 RX ADMIN — ATORVASTATIN CALCIUM 10 MG: 10 TABLET, FILM COATED ORAL at 08:06

## 2024-06-07 RX ADMIN — PIPERACILLIN AND TAZOBACTAM 4.5 G: 4; .5 INJECTION, POWDER, LYOPHILIZED, FOR SOLUTION INTRAVENOUS; PARENTERAL at 05:06

## 2024-06-07 RX ADMIN — MAGNESIUM SULFATE HEPTAHYDRATE 2 G: 40 INJECTION, SOLUTION INTRAVENOUS at 02:06

## 2024-06-07 RX ADMIN — SODIUM CHLORIDE 1500 ML: 9 INJECTION, SOLUTION INTRAVENOUS at 05:06

## 2024-06-07 RX ADMIN — ACETAMINOPHEN 1000 MG: 500 TABLET ORAL at 03:06

## 2024-06-07 NOTE — ED NOTES
"Per family pt "seems off". Family reports pt has been like this since this morning. Pt appears sleepy but is able to answer all questions approprietly. Pt currently oriented x 4. Dr Kilgore notified.   "

## 2024-06-07 NOTE — ED NOTES
"Family reporting "pt seems more agitated" at this time. Pt laying in bed moving head side to side. Pt oriented x 4. . Dr Kilgore notified   "

## 2024-06-07 NOTE — Clinical Note
Bilateral: Back.   Scrubbed with Betadine.    Hair: N/A.  Skin prep dry before draping.  Prepped by: Mary Moore NP 6/14/2024 2:43 PM.

## 2024-06-07 NOTE — ED NOTES
Pt to ER with reports of lower back pain and generalized fatigue since waking up this morning. Pt denies GI/ symptoms. Pt reports family member + for covid a while back but has since tested negative. IV started and labs drawn. Flu/Covid running. Pt hooked up to monitor and awaiting MD evaluation and assessment. Will continue to monitor

## 2024-06-07 NOTE — Clinical Note
Back.   Scrubbed with Betadine.    Hair: N/A.  Skin prep dry before draping.  Prepped by: Maurisio Adan MD 6/8/2024 12:20 PM.

## 2024-06-07 NOTE — ADMISSIONCARE
AdmissionCare    Guideline: Sepsis (and Other Febrile Illness without Focal Infection) - INPT, Inpatient    Based on the indications selected for the patient, the bed status of Inpatient was determined to be MET    The following indications were selected as present at the time of evaluation of the patient:      - Parenteral antimicrobial regimen that must be implemented on inpatient basis (eg, infusion or monitoring needs beyond capabilities of outpatient parenteral therapy)    AdmissionCare documentation entered by: Adrianne Ansari    [facility], 28th edition, Copyright © 2024 [facility] [employer], [facility] All Rights Reserved.  [id number]

## 2024-06-07 NOTE — ED PROVIDER NOTES
Encounter Date: 6/7/2024    SCRIBE #1 NOTE: I, Danitza Corrigan, am scribing for, and in the presence of,  Harjit Kilgore MD. I have scribed the following portions of the note - Other sections scribed: HPI, ROS.       History     Chief Complaint   Patient presents with    Fatigue     Reports waking up this morning with generalized weakness. Reports cough that started last night. Reports lower back pain with cough. Denies chest pain, sob, dizziness,N/V.      This is a 79 year old female, with a PMHx of DM and HTN, who presents to the ED complaining of Generalized Weakness, symptoms onset this morning. Patient reports lightheadedness, left sided thoracic pain, lower lumbar pain, and frontal headache.  Patient states her back pan is worsened with cough. Patient denies Hx of blood clots in legs/lungs. Patient denies cough, shortness of breath, chest pain, fever, chills, abdominal pain, nausea, vomiting, diarrhea, dysuria, congestion, sore throat, arm or leg trouble, eye pain, ear pain, rash, or other associated symptoms. No other alleviating or exacerbating factors. This is the extent of the patient's complaints in the ED. NKDA.                The history is provided by the patient. No  was used.     Review of patient's allergies indicates:  No Known Allergies  Past Medical History:   Diagnosis Date    Diabetes mellitus     Hypertension      No past surgical history on file.  No family history on file.  Social History     Tobacco Use    Smoking status: Never    Smokeless tobacco: Never   Substance Use Topics    Alcohol use: Not Currently     Review of Systems   Constitutional:  Negative for chills, diaphoresis and fever.   HENT:  Negative for congestion, ear pain and sore throat.    Eyes:  Negative for pain and visual disturbance.   Respiratory:  Negative for cough and shortness of breath.    Cardiovascular:  Negative for chest pain.   Gastrointestinal:  Negative for abdominal pain, diarrhea, nausea  and vomiting.   Genitourinary:  Negative for dysuria.   Musculoskeletal:  Positive for back pain (lumar & thoracic). Negative for myalgias.   Skin:  Negative for rash.   Neurological:  Positive for weakness (generalized), light-headedness and headaches.       Physical Exam     Initial Vitals [06/07/24 0954]   BP Pulse Resp Temp SpO2   (!) 171/80 108 20 100 °F (37.8 °C) 97 %      MAP       --         Physical Exam   The patient was examined specifically for the following:   General:No significant distress, Good color, Warm and dry. Head and neck:Scalp atraumatic, Neck supple. Neurological:Appropriate conversation, Gross motor deficits. Eyes:Conjugate gaze, Clear corneas. ENT: No epistaxis. Cardiac: Regular rate and rhythm, Grossly normal heart tones. Pulmonary: Wheezing, Rales. Gastrointestinal: Abdominal tenderness, Abdominal distention. Musculoskeletal: Extremity deformity, Apparent pain with range of motion of the joints. Skin: Rash.   The findings on examination were normal except for the following:  the patient has minimal tenderness of the low lumbar back.  There is no splinting with posture changes.  There is minimal tenderness in the posterior aspect of the trapezius in the lobby of the left shoulder.  Lungs are clear.  The heart tones are normal.  The abdomen is soft.  Extremities are nontender there is no swelling or tenderness of the lower extremities.  There is no ankle edema.  ED Course   Critical Care    Date/Time: 6/7/2024 5:16 PM    Performed by: Harjit Kilgore MD  Authorized by: Harjit Kilgore MD  Direct patient critical care time: 22 minutes  Additional history critical care time: 11 minutes  Ordering / reviewing critical care time: 11 minutes  Documentation critical care time: 11 minutes  Consulting other physicians critical care time: 11 minutes  Total critical care time (exclusive of procedural time) : 66 minutes  Critical care time was exclusive of separately billable procedures and  treating other patients and teaching time.  Critical care was necessary to treat or prevent imminent or life-threatening deterioration of the following conditions: CNS failure or compromise and sepsis.  Critical care was time spent personally by me on the following activities: development of treatment plan with patient or surrogate, discussions with primary provider, evaluation of patient's response to treatment, examination of patient, obtaining history from patient or surrogate, ordering and performing treatments and interventions, ordering and review of laboratory studies, ordering and review of radiographic studies, pulse oximetry, re-evaluation of patient's condition and review of old charts.        Labs Reviewed   CBC W/ AUTO DIFFERENTIAL - Abnormal; Notable for the following components:       Result Value    Gran # (ANC) 8.6 (*)     Gran % 76.8 (*)     Lymph % 16.5 (*)     All other components within normal limits   COMPREHENSIVE METABOLIC PANEL - Abnormal; Notable for the following components:    Glucose 194 (*)     eGFR 47 (*)     All other components within normal limits   URINALYSIS, REFLEX TO URINE CULTURE - Abnormal; Notable for the following components:    Protein, UA 1+ (*)     Glucose, UA 1+ (*)     All other components within normal limits    Narrative:     Specimen Source->Urine   MAGNESIUM - Abnormal; Notable for the following components:    Magnesium 1.2 (*)     All other components within normal limits   TSH - Abnormal; Notable for the following components:    TSH 0.223 (*)     All other components within normal limits   URINALYSIS MICROSCOPIC - Abnormal; Notable for the following components:    RBC, UA 5 (*)     All other components within normal limits    Narrative:     Specimen Source->Urine   POCT GLUCOSE - Abnormal; Notable for the following components:    POCT Glucose 209 (*)     All other components within normal limits   POCT GLUCOSE - Abnormal; Notable for the following components:     POCT Glucose 136 (*)     All other components within normal limits   CULTURE, BLOOD   CULTURE, BLOOD   CULTURE, BLOOD   CULTURE, BLOOD   PROCALCITONIN   TROPONIN I   T4, FREE   ALCOHOL,MEDICAL (ETHANOL)   DRUG SCREEN PANEL, URINE EMERGENCY   LACTIC ACID, PLASMA   LACTIC ACID, PLASMA   POCT INFLUENZA A/B MOLECULAR   SARS-COV-2 RDRP GENE   POCT GLUCOSE MONITORING CONTINUOUS     EKG Readings: (Independently Interpreted)    This patient is in a sinus rhythm with a heart rate in 97.  There is a wide QRS complex.  The patient has a left bundle branch block.  There is left axis deviation.  There are no significant ST segment or T-wave changes.  There is no definite evidence of acute myocardial infarction.       Imaging Results              CT Head Without Contrast (Final result)  Result time 06/07/24 15:27:34      Final result by Dre Obrien DO (06/07/24 15:27:34)                   Impression:      Unremarkable noncontrast CT head as detailed above specifically without evidence for acute intracranial hemorrhage.  Clinical correlation and further evaluation as warranted.      Electronically signed by: Dre Obrien DO  Date:    06/07/2024  Time:    15:27               Narrative:    EXAMINATION:  CT HEAD WITHOUT CONTRAST    CLINICAL HISTORY:  Headache, new or worsening (Age >= 50y);  Headache, unspecified    TECHNIQUE:  Multiple sequential 5 mm axial images of the head without contrast.  Coronal and sagittal reformatted imaging from the axial acquisition.    COMPARISON:  None    FINDINGS:  There is no evidence for acute intracranial hemorrhage or sulcal effacement.  The ventricles are normal in size without hydrocephalus.  There is mild ill-defined decreased duration supratentorial white matter which is nonspecific and may be sequela of mild underlying chronic ischemic change.  Incidental basal gangliar calcifications identified    There is no midline shift or mass effect.  Visualized paranasal sinuses and mastoid air  cells are clear.                                       X-Ray Chest AP Portable (Final result)  Result time 06/07/24 12:01:08      Final result by Brian Carrington MD (06/07/24 12:01:08)                   Impression:      1. Chronic appearing interstitial findings, no large focal consolidation.      Electronically signed by: Brian Carrington MD  Date:    06/07/2024  Time:    12:01               Narrative:    EXAMINATION:  XR CHEST AP PORTABLE    CLINICAL HISTORY:  Sepsis;    TECHNIQUE:  Single frontal view of the chest was performed.    COMPARISON:  09/14/2022    FINDINGS:  The cardiomediastinal silhouette is not enlarged noting calcification of the aorta..  There is no pleural effusion.  The trachea is midline.  The lungs are symmetrically expanded bilaterally with coarse interstitial attenuation.  There is a calcific focus projected over the right upper lung zone, stable.  There is bilateral basilar subsegmental atelectasis or scarring, left greater than right..  No large focal consolidation seen.  There is no pneumothorax.  The osseous structures are remarkable for degenerative change..                                       Medications   ibuprofen tablet 600 mg (has no administration in time range)   sodium chloride 0.9% bolus 1,500 mL 1,500 mL (has no administration in time range)   piperacillin-tazobactam (ZOSYN) 4.5 g in dextrose 5 % in water (D5W) 100 mL IVPB (MB+) (has no administration in time range)   vancomycin 1.5 g in dextrose 5 % 250 mL IVPB (ready to mix) (has no administration in time range)   sodium chloride 0.9% bolus 1,000 mL 1,000 mL (0 mLs Intravenous Stopped 6/7/24 1259)   magnesium sulfate 2g in water 50mL IVPB (premix) (0 g Intravenous Stopped 6/7/24 1709)   acetaminophen tablet 1,000 mg (1,000 mg Oral Given 6/7/24 1525)     Medical Decision Making  Amount and/or Complexity of Data Reviewed  Labs: ordered.  Radiology: ordered.    Risk  OTC drugs.  Prescription drug management.     Given the  above this patient presents emergency room with generalized weakness.  She arrived without a fever.  There was no tachycardia.  The patient has a mild frontal headache.  The neck is supple.  The patient has a nonfocal neurologic examination.  The family has reported confusion here in the emergency room.  She was warm to touch repeat rectal examination was 103.4.  Interestingly the patient has a negative procalcitonin.  The urine is clean chest x-ray is negative.  I am wondering whether this is a viral syndrome.  I will treat empirically with antibiotics.  I will also do the blood cultures.  I will give full sepsis fluids.  The patient will require admission for fever with altered mental status.  I considered meningitis but again the neck is supple.  Bacteremia remains a possibility.        Scribe Attestation:   Scribe #1: I performed the above scribed service and the documentation accurately describes the services I performed. I attest to the accuracy of the note.             Please note that the documentation on this chart was provided by the scribe above on the date of service noted above, and that the documentation in the chart accurately reflects the work and decisions made by me alone.  Signed, Dr. Kiglore                      Clinical Impression:  Final diagnoses:  [R53.1] Generalized weakness  [R51.9] Headache in front of head  [R50.9] Fever, unspecified fever cause (Primary)  [R41.82] Altered mental status, unspecified altered mental status type                 Harjit Kilgore MD  06/07/24 1509       Harjit Kilgore MD  06/07/24 6577

## 2024-06-08 PROBLEM — N13.30 HYDROURETERONEPHROSIS: Status: ACTIVE | Noted: 2024-06-08

## 2024-06-08 PROBLEM — G03.9 MENINGITIS: Status: ACTIVE | Noted: 2024-06-08

## 2024-06-08 PROBLEM — G04.90 MENINGOENCEPHALITIS: Status: ACTIVE | Noted: 2024-06-08

## 2024-06-08 LAB
ADENOVIRUS: NOT DETECTED
ALBUMIN SERPL BCP-MCNC: 3.2 G/DL (ref 3.5–5.2)
ALP SERPL-CCNC: 58 U/L (ref 55–135)
ALT SERPL W/O P-5'-P-CCNC: 12 U/L (ref 10–44)
ANION GAP SERPL CALC-SCNC: 12 MMOL/L (ref 8–16)
AST SERPL-CCNC: 15 U/L (ref 10–40)
BASOPHILS # BLD AUTO: 0.03 K/UL (ref 0–0.2)
BASOPHILS NFR BLD: 0.3 % (ref 0–1.9)
BILIRUB SERPL-MCNC: 1 MG/DL (ref 0.1–1)
BILIRUB UR QL STRIP: NEGATIVE
BORDETELLA PARAPERTUSSIS (IS1001): NOT DETECTED
BORDETELLA PERTUSSIS (PTXP): NOT DETECTED
BUN SERPL-MCNC: 11 MG/DL (ref 8–23)
CALCIUM SERPL-MCNC: 8.9 MG/DL (ref 8.7–10.5)
CHLAMYDIA PNEUMONIAE: NOT DETECTED
CHLORIDE SERPL-SCNC: 107 MMOL/L (ref 95–110)
CLARITY CSF: ABNORMAL
CLARITY UR: CLEAR
CO2 SERPL-SCNC: 22 MMOL/L (ref 23–29)
COLOR CSF: COLORLESS
COLOR UR: COLORLESS
CORONAVIRUS 229E, COMMON COLD VIRUS: NOT DETECTED
CORONAVIRUS HKU1, COMMON COLD VIRUS: NOT DETECTED
CORONAVIRUS NL63, COMMON COLD VIRUS: NOT DETECTED
CORONAVIRUS OC43, COMMON COLD VIRUS: NOT DETECTED
CREAT SERPL-MCNC: 0.9 MG/DL (ref 0.5–1.4)
CSF TUBE NUMBER: 1
CSF TUBE NUMBER: 1
DIFFERENTIAL METHOD BLD: ABNORMAL
EOSINOPHIL # BLD AUTO: 0 K/UL (ref 0–0.5)
EOSINOPHIL NFR BLD: 0.2 % (ref 0–8)
ERYTHROCYTE [DISTWIDTH] IN BLOOD BY AUTOMATED COUNT: 13.4 % (ref 11.5–14.5)
EST. GFR  (NO RACE VARIABLE): >60 ML/MIN/1.73 M^2
ESTIMATED AVG GLUCOSE: 183 MG/DL (ref 68–131)
ESTIMATED AVG GLUCOSE: 186 MG/DL (ref 68–131)
FLUBV RNA NPH QL NAA+NON-PROBE: NOT DETECTED
GLUCOSE CSF-MCNC: 100 MG/DL (ref 40–70)
GLUCOSE SERPL-MCNC: 149 MG/DL (ref 70–110)
GLUCOSE UR QL STRIP: ABNORMAL
HBA1C MFR BLD: 8 % (ref 4–5.6)
HBA1C MFR BLD: 8.1 % (ref 4–5.6)
HCT VFR BLD AUTO: 43.4 % (ref 37–48.5)
HGB BLD-MCNC: 13.5 G/DL (ref 12–16)
HGB UR QL STRIP: NEGATIVE
HPIV1 RNA NPH QL NAA+NON-PROBE: NOT DETECTED
HPIV2 RNA NPH QL NAA+NON-PROBE: NOT DETECTED
HPIV3 RNA NPH QL NAA+NON-PROBE: NOT DETECTED
HPIV4 RNA NPH QL NAA+NON-PROBE: NOT DETECTED
HUMAN METAPNEUMOVIRUS: NOT DETECTED
IMM GRANULOCYTES # BLD AUTO: 0.04 K/UL (ref 0–0.04)
IMM GRANULOCYTES NFR BLD AUTO: 0.4 % (ref 0–0.5)
INFLUENZA A (SUBTYPES H1,H1-2009,H3): NOT DETECTED
KETONES UR QL STRIP: NEGATIVE
LACTATE SERPL-SCNC: 1.9 MMOL/L (ref 0.5–2.2)
LEUKOCYTE ESTERASE UR QL STRIP: NEGATIVE
LYMPHOCYTES # BLD AUTO: 2.2 K/UL (ref 1–4.8)
LYMPHOCYTES NFR BLD: 21.9 % (ref 18–48)
LYMPHOCYTES NFR CSF MANUAL: 75 % (ref 40–80)
MCH RBC QN AUTO: 27.8 PG (ref 27–31)
MCHC RBC AUTO-ENTMCNC: 31.1 G/DL (ref 32–36)
MCV RBC AUTO: 90 FL (ref 82–98)
MONOCYTES # BLD AUTO: 1 K/UL (ref 0.3–1)
MONOCYTES NFR BLD: 10.4 % (ref 4–15)
MONOS+MACROS NFR CSF MANUAL: 15 % (ref 15–45)
MYCOPLASMA PNEUMONIAE: NOT DETECTED
NEUTROPHILS # BLD AUTO: 6.7 K/UL (ref 1.8–7.7)
NEUTROPHILS NFR BLD: 66.8 % (ref 38–73)
NEUTROPHILS NFR CSF MANUAL: 6 % (ref 0–6)
NITRITE UR QL STRIP: NEGATIVE
NRBC BLD-RTO: 0 /100 WBC
OTHER CELLS CSF: 4 %
PH UR STRIP: 7 [PH] (ref 5–8)
PLATELET # BLD AUTO: 206 K/UL (ref 150–450)
PMV BLD AUTO: 10.4 FL (ref 9.2–12.9)
POCT GLUCOSE: 158 MG/DL (ref 70–110)
POCT GLUCOSE: 178 MG/DL (ref 70–110)
POCT GLUCOSE: 253 MG/DL (ref 70–110)
POCT GLUCOSE: 261 MG/DL (ref 70–110)
POTASSIUM SERPL-SCNC: 3.9 MMOL/L (ref 3.5–5.1)
PROCALCITONIN SERPL IA-MCNC: 0.04 NG/ML
PROCALCITONIN SERPL IA-MCNC: 0.04 NG/ML
PROT CSF-MCNC: 223 MG/DL (ref 15–40)
PROT SERPL-MCNC: 7.5 G/DL (ref 6–8.4)
PROT UR QL STRIP: NEGATIVE
RBC # BLD AUTO: 4.85 M/UL (ref 4–5.4)
RBC # CSF: 1125 /CU MM
RESPIRATORY INFECTION PANEL SOURCE: NORMAL
RSV RNA NPH QL NAA+NON-PROBE: NOT DETECTED
RV+EV RNA NPH QL NAA+NON-PROBE: NOT DETECTED
SARS-COV-2 RNA RESP QL NAA+PROBE: NOT DETECTED
SODIUM SERPL-SCNC: 141 MMOL/L (ref 136–145)
SP GR UR STRIP: 1.01 (ref 1–1.03)
SPECIMEN VOL CSF: 1 ML
URN SPEC COLLECT METH UR: ABNORMAL
UROBILINOGEN UR STRIP-ACNC: NEGATIVE EU/DL
WBC # BLD AUTO: 10.02 K/UL (ref 3.9–12.7)
WBC # CSF: 560 /CU MM (ref 0–5)

## 2024-06-08 PROCEDURE — 25000003 PHARM REV CODE 250: Performed by: INTERNAL MEDICINE

## 2024-06-08 PROCEDURE — 99452 NTRPROF PH1/NTRNET/EHR RFRL: CPT | Mod: ,,, | Performed by: STUDENT IN AN ORGANIZED HEALTH CARE EDUCATION/TRAINING PROGRAM

## 2024-06-08 PROCEDURE — 20000000 HC ICU ROOM

## 2024-06-08 PROCEDURE — 87798 DETECT AGENT NOS DNA AMP: CPT | Mod: 59 | Performed by: INTERNAL MEDICINE

## 2024-06-08 PROCEDURE — 87070 CULTURE OTHR SPECIMN AEROBIC: CPT | Performed by: INTERNAL MEDICINE

## 2024-06-08 PROCEDURE — 85025 COMPLETE CBC W/AUTO DIFF WBC: CPT | Performed by: STUDENT IN AN ORGANIZED HEALTH CARE EDUCATION/TRAINING PROGRAM

## 2024-06-08 PROCEDURE — 87102 FUNGUS ISOLATION CULTURE: CPT | Performed by: INTERNAL MEDICINE

## 2024-06-08 PROCEDURE — 87529 HSV DNA AMP PROBE: CPT | Performed by: STUDENT IN AN ORGANIZED HEALTH CARE EDUCATION/TRAINING PROGRAM

## 2024-06-08 PROCEDURE — 93005 ELECTROCARDIOGRAM TRACING: CPT

## 2024-06-08 PROCEDURE — 82945 GLUCOSE OTHER FLUID: CPT | Performed by: STUDENT IN AN ORGANIZED HEALTH CARE EDUCATION/TRAINING PROGRAM

## 2024-06-08 PROCEDURE — 83605 ASSAY OF LACTIC ACID: CPT | Performed by: STUDENT IN AN ORGANIZED HEALTH CARE EDUCATION/TRAINING PROGRAM

## 2024-06-08 PROCEDURE — 83036 HEMOGLOBIN GLYCOSYLATED A1C: CPT | Performed by: STUDENT IN AN ORGANIZED HEALTH CARE EDUCATION/TRAINING PROGRAM

## 2024-06-08 PROCEDURE — 63600175 PHARM REV CODE 636 W HCPCS: Performed by: INTERNAL MEDICINE

## 2024-06-08 PROCEDURE — 80053 COMPREHEN METABOLIC PANEL: CPT | Performed by: INTERNAL MEDICINE

## 2024-06-08 PROCEDURE — 93010 ELECTROCARDIOGRAM REPORT: CPT | Mod: ,,, | Performed by: INTERNAL MEDICINE

## 2024-06-08 PROCEDURE — 87798 DETECT AGENT NOS DNA AMP: CPT | Performed by: STUDENT IN AN ORGANIZED HEALTH CARE EDUCATION/TRAINING PROGRAM

## 2024-06-08 PROCEDURE — C1751 CATH, INF, PER/CENT/MIDLINE: HCPCS

## 2024-06-08 PROCEDURE — 009U3ZX DRAINAGE OF SPINAL CANAL, PERCUTANEOUS APPROACH, DIAGNOSTIC: ICD-10-PCS | Performed by: STUDENT IN AN ORGANIZED HEALTH CARE EDUCATION/TRAINING PROGRAM

## 2024-06-08 PROCEDURE — 63600175 PHARM REV CODE 636 W HCPCS: Performed by: STUDENT IN AN ORGANIZED HEALTH CARE EDUCATION/TRAINING PROGRAM

## 2024-06-08 PROCEDURE — 84157 ASSAY OF PROTEIN OTHER: CPT | Performed by: STUDENT IN AN ORGANIZED HEALTH CARE EDUCATION/TRAINING PROGRAM

## 2024-06-08 PROCEDURE — 81003 URINALYSIS AUTO W/O SCOPE: CPT | Performed by: INTERNAL MEDICINE

## 2024-06-08 PROCEDURE — 36410 VNPNXR 3YR/> PHY/QHP DX/THER: CPT

## 2024-06-08 PROCEDURE — 87205 SMEAR GRAM STAIN: CPT | Performed by: INTERNAL MEDICINE

## 2024-06-08 PROCEDURE — 27000221 HC OXYGEN, UP TO 24 HOURS

## 2024-06-08 PROCEDURE — G0427 INPT/ED TELECONSULT70: HCPCS | Mod: GT,,, | Performed by: PSYCHIATRY & NEUROLOGY

## 2024-06-08 PROCEDURE — 94761 N-INVAS EAR/PLS OXIMETRY MLT: CPT

## 2024-06-08 PROCEDURE — 86592 SYPHILIS TEST NON-TREP QUAL: CPT | Performed by: INTERNAL MEDICINE

## 2024-06-08 PROCEDURE — 36415 COLL VENOUS BLD VENIPUNCTURE: CPT | Performed by: STUDENT IN AN ORGANIZED HEALTH CARE EDUCATION/TRAINING PROGRAM

## 2024-06-08 PROCEDURE — A4216 STERILE WATER/SALINE, 10 ML: HCPCS | Performed by: STUDENT IN AN ORGANIZED HEALTH CARE EDUCATION/TRAINING PROGRAM

## 2024-06-08 PROCEDURE — 25000003 PHARM REV CODE 250: Performed by: STUDENT IN AN ORGANIZED HEALTH CARE EDUCATION/TRAINING PROGRAM

## 2024-06-08 PROCEDURE — 89051 BODY FLUID CELL COUNT: CPT | Performed by: STUDENT IN AN ORGANIZED HEALTH CARE EDUCATION/TRAINING PROGRAM

## 2024-06-08 PROCEDURE — 87483 CNS DNA AMP PROBE TYPE 12-25: CPT | Performed by: INTERNAL MEDICINE

## 2024-06-08 PROCEDURE — 27000207 HC ISOLATION

## 2024-06-08 PROCEDURE — 84145 PROCALCITONIN (PCT): CPT | Performed by: STUDENT IN AN ORGANIZED HEALTH CARE EDUCATION/TRAINING PROGRAM

## 2024-06-08 PROCEDURE — 99900035 HC TECH TIME PER 15 MIN (STAT)

## 2024-06-08 PROCEDURE — 87040 BLOOD CULTURE FOR BACTERIA: CPT | Performed by: INTERNAL MEDICINE

## 2024-06-08 PROCEDURE — 95718 EEG PHYS/QHP 2-12 HR W/VEEG: CPT | Mod: ,,, | Performed by: PSYCHIATRY & NEUROLOGY

## 2024-06-08 PROCEDURE — 86403 PARTICLE AGGLUT ANTBDY SCRN: CPT | Performed by: INTERNAL MEDICINE

## 2024-06-08 RX ORDER — SODIUM CHLORIDE 0.9 % (FLUSH) 0.9 %
10 SYRINGE (ML) INJECTION
Status: DISCONTINUED | OUTPATIENT
Start: 2024-06-08 | End: 2024-06-17 | Stop reason: HOSPADM

## 2024-06-08 RX ORDER — DEXAMETHASONE SODIUM PHOSPHATE 4 MG/ML
12 INJECTION, SOLUTION INTRA-ARTICULAR; INTRALESIONAL; INTRAMUSCULAR; INTRAVENOUS; SOFT TISSUE EVERY 6 HOURS
Status: DISCONTINUED | OUTPATIENT
Start: 2024-06-08 | End: 2024-06-09

## 2024-06-08 RX ORDER — MUPIROCIN 20 MG/G
OINTMENT TOPICAL 2 TIMES DAILY
Status: COMPLETED | OUTPATIENT
Start: 2024-06-08 | End: 2024-06-13

## 2024-06-08 RX ORDER — LEVETIRACETAM 500 MG/5ML
500 INJECTION, SOLUTION, CONCENTRATE INTRAVENOUS EVERY 12 HOURS
Status: DISCONTINUED | OUTPATIENT
Start: 2024-06-08 | End: 2024-06-08

## 2024-06-08 RX ORDER — HYDROMORPHONE HYDROCHLORIDE 1 MG/ML
0.5 INJECTION, SOLUTION INTRAMUSCULAR; INTRAVENOUS; SUBCUTANEOUS
Status: DISCONTINUED | OUTPATIENT
Start: 2024-06-08 | End: 2024-06-09

## 2024-06-08 RX ORDER — SODIUM CHLORIDE 0.9 % (FLUSH) 0.9 %
10 SYRINGE (ML) INJECTION EVERY 6 HOURS
Status: DISCONTINUED | OUTPATIENT
Start: 2024-06-08 | End: 2024-06-17 | Stop reason: HOSPADM

## 2024-06-08 RX ORDER — KETOROLAC TROMETHAMINE 30 MG/ML
15 INJECTION, SOLUTION INTRAMUSCULAR; INTRAVENOUS ONCE
Status: DISCONTINUED | OUTPATIENT
Start: 2024-06-08 | End: 2024-06-08

## 2024-06-08 RX ORDER — SODIUM CHLORIDE 9 MG/ML
INJECTION, SOLUTION INTRAVENOUS CONTINUOUS
Status: DISCONTINUED | OUTPATIENT
Start: 2024-06-08 | End: 2024-06-15

## 2024-06-08 RX ORDER — ACETAMINOPHEN 650 MG/1
650 SUPPOSITORY RECTAL EVERY 6 HOURS PRN
Status: DISCONTINUED | OUTPATIENT
Start: 2024-06-08 | End: 2024-06-17 | Stop reason: HOSPADM

## 2024-06-08 RX ORDER — SODIUM CHLORIDE 9 MG/ML
INJECTION, SOLUTION INTRAVENOUS CONTINUOUS
Status: DISCONTINUED | OUTPATIENT
Start: 2024-06-08 | End: 2024-06-08

## 2024-06-08 RX ORDER — LEVETIRACETAM 500 MG/5ML
500 INJECTION, SOLUTION, CONCENTRATE INTRAVENOUS EVERY 12 HOURS
Status: DISCONTINUED | OUTPATIENT
Start: 2024-06-08 | End: 2024-06-12

## 2024-06-08 RX ADMIN — ACETAMINOPHEN 650 MG: 650 SUPPOSITORY RECTAL at 07:06

## 2024-06-08 RX ADMIN — AMPICILLIN SODIUM 2 G: 2 INJECTION, POWDER, FOR SOLUTION INTRAMUSCULAR; INTRAVENOUS at 09:06

## 2024-06-08 RX ADMIN — AMPICILLIN SODIUM 2 G: 2 INJECTION, POWDER, FOR SOLUTION INTRAMUSCULAR; INTRAVENOUS at 10:06

## 2024-06-08 RX ADMIN — ACETAMINOPHEN 650 MG: 650 SUPPOSITORY RECTAL at 05:06

## 2024-06-08 RX ADMIN — DEXAMETHASONE SODIUM PHOSPHATE 12 MG: 4 INJECTION INTRA-ARTICULAR; INTRALESIONAL; INTRAMUSCULAR; INTRAVENOUS; SOFT TISSUE at 11:06

## 2024-06-08 RX ADMIN — HYDROMORPHONE HYDROCHLORIDE 0.5 MG: 1 INJECTION, SOLUTION INTRAMUSCULAR; INTRAVENOUS; SUBCUTANEOUS at 03:06

## 2024-06-08 RX ADMIN — ACYCLOVIR SODIUM 570 MG: 50 INJECTION, SOLUTION INTRAVENOUS at 05:06

## 2024-06-08 RX ADMIN — INSULIN ASPART 1 UNITS: 100 INJECTION, SOLUTION INTRAVENOUS; SUBCUTANEOUS at 11:06

## 2024-06-08 RX ADMIN — SODIUM CHLORIDE: 9 INJECTION, SOLUTION INTRAVENOUS at 09:06

## 2024-06-08 RX ADMIN — VANCOMYCIN HYDROCHLORIDE 1000 MG: 1 INJECTION, POWDER, LYOPHILIZED, FOR SOLUTION INTRAVENOUS at 07:06

## 2024-06-08 RX ADMIN — CEFTRIAXONE 2 G: 2 INJECTION, POWDER, FOR SOLUTION INTRAMUSCULAR; INTRAVENOUS at 09:06

## 2024-06-08 RX ADMIN — PIPERACILLIN SODIUM AND TAZOBACTAM SODIUM 4.5 G: 4; .5 INJECTION, POWDER, LYOPHILIZED, FOR SOLUTION INTRAVENOUS at 01:06

## 2024-06-08 RX ADMIN — ACETAMINOPHEN 650 MG: 650 SUPPOSITORY RECTAL at 12:06

## 2024-06-08 RX ADMIN — AMPICILLIN SODIUM 2 G: 2 INJECTION, POWDER, FOR SOLUTION INTRAMUSCULAR; INTRAVENOUS at 03:06

## 2024-06-08 RX ADMIN — DEXAMETHASONE SODIUM PHOSPHATE 12 MG: 4 INJECTION INTRA-ARTICULAR; INTRALESIONAL; INTRAMUSCULAR; INTRAVENOUS; SOFT TISSUE at 01:06

## 2024-06-08 RX ADMIN — HYDROMORPHONE HYDROCHLORIDE 0.5 MG: 1 INJECTION, SOLUTION INTRAMUSCULAR; INTRAVENOUS; SUBCUTANEOUS at 06:06

## 2024-06-08 RX ADMIN — Medication 10 ML: at 05:06

## 2024-06-08 RX ADMIN — SODIUM CHLORIDE: 9 INJECTION, SOLUTION INTRAVENOUS at 01:06

## 2024-06-08 RX ADMIN — INSULIN ASPART 3 UNITS: 100 INJECTION, SOLUTION INTRAVENOUS; SUBCUTANEOUS at 06:06

## 2024-06-08 RX ADMIN — ENOXAPARIN SODIUM 40 MG: 40 INJECTION SUBCUTANEOUS at 05:06

## 2024-06-08 RX ADMIN — ACYCLOVIR SODIUM 570 MG: 50 INJECTION, SOLUTION INTRAVENOUS at 10:06

## 2024-06-08 RX ADMIN — MUPIROCIN: 20 OINTMENT TOPICAL at 08:06

## 2024-06-08 RX ADMIN — AMPHOTERICIN B 400 MG: 50 INJECTION, POWDER, LYOPHILIZED, FOR SOLUTION INTRAVENOUS at 04:06

## 2024-06-08 RX ADMIN — LEVETIRACETAM 500 MG: 100 INJECTION, SOLUTION INTRAVENOUS at 08:06

## 2024-06-08 RX ADMIN — DEXAMETHASONE SODIUM PHOSPHATE 12 MG: 4 INJECTION INTRA-ARTICULAR; INTRALESIONAL; INTRAMUSCULAR; INTRAVENOUS; SOFT TISSUE at 05:06

## 2024-06-08 NOTE — PROGRESS NOTES
[facility] - Intensive Care   E-Consult     Patient Name: Sussy Fulton  MRN: [id number]  Primary Care Provider: Arlen Rivera MD     CC Reason for Request: Concern for obstructive uropathy from CT report    HPI    Patient admitted for fever of unknown origin. Urology consulted based on CT findings. Patient without urinary symptoms, voids 4 x per day. Robison was placed given hydronephrosis reported on imaging. No obstructing stone or suggestion of obstructing mass. No difficulty with robison placement per RN.     ROS: Not able to obtain patient is disoriented    Physical Exam: Virtual Evaluation    Labs/Imaging  Chart reviewed: UA/ CBC/ BMP, CT scan    A/P  Mild hydronephrosis possible urinary retention  No difficulty with robison placement to suggest obstruction. Patient may have not voided in a while    UA did not suggest infection but a culture is pending.   No elevated Cr to suggest acute kidney injury from obstructive uropathy.   She is currently being evaluated for meningitis.     Given the risk to the physician a virtual / e consultation will be performed. Given that her urologic issues or not urgent or impacting her current clinical picture a virtual consultation was attempted. Unfortunately there were audio issues with the two work stations. I spoke with her sister Sussy who provided additional information as patient is altered. I reviewed the documented notes in her chart      Her catheter can be removed as UTI nor ANITA is part of her clinical picture. Unless there is a documented elevated PVR with lower abdominal discomfort, a robison is a less likely to make a significant impact on her care.     I will arrange for outpatient follow up to ensure her hydronephrosis is evaluated for resolution. It may be a chronic condition incidentally detected.         I spoke w/ Dr. Espitia directly re: this patient care    Total time spent preparing for the referral and/or communicating with the consulting physician: Time;  16 MIN - 1 HOUR: 20 minutes      Thank you for your time. I will sign off. Please contact us if you have any additional questions.    Tennille Mortensen MD  [facility] - Intensive Care

## 2024-06-08 NOTE — HOSPITAL COURSE
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  Acute metabolic encephalopathy.  CT with b/l hydronephrosis.  Patient was initiated on empiric therapy for meningitis given stiff neck and AMS and moved to ICU.  ID and Neurology were consulted.  status post IR guided LP with 560 WBC and 75% lymphocytes - concerning for viral or fungal. Elevated Protein 223.was on Amphotericin B, vanc, ceftriaxone, ampicillin, acylocivir and dexamethasone. Awaiting further culture data.  DC amphotericin and Decadron per ID recs.  Mental status improving.  MRI negative  EEG revealed diffuse slowing suggestive of mild diffuse cerebral dysfunction. No epileptiform activity or electrographic seizures seen.  VZV VDRL and [place]   Repeat blood cultures and urinalysis with urine culture.no major growths.  PT/OT on board.  Reconsulted Neurology .  Plans for repeat LP if not much improvement,  Patient is alert time 3 on my evaluation,,with no sign of neck stiffness, monitored d while was on acyclovir,,while is on acyclovir.updated family, had repeat LP with autoimmune hepatitis and fungal and heppes PCR.remains afebrile and alert time 3,did well with PT,OT .  Patient was discharged home per ID recommendations with IV home infusion with Acyclovir until 6.28.24 and follow up with PCP as out patient.

## 2024-06-08 NOTE — HPI
80 yo woman admitted for FUO. CT scan obtained revealing mild hydro bilaterally and moderately sdistended bladder. Patient without Cr elevation or concern for UTI. Muhammad placed. Patient undergoing meningitis work up.

## 2024-06-08 NOTE — SUBJECTIVE & OBJECTIVE
Past Medical History:   Diagnosis Date    Diabetes mellitus     Hypertension        No past surgical history on file.    Review of patient's allergies indicates:  No Known Allergies    No current facility-administered medications on file prior to encounter.     Current Outpatient Medications on File Prior to Encounter   Medication Sig    atorvastatin (LIPITOR) 10 MG tablet Take 10 mg by mouth once daily. Patient doesn't know the dose    cetirizine (ZYRTEC) 10 MG tablet Take 10 mg by mouth once daily.    diclofenac sodium (VOLTAREN) 1 % Gel Apply 2 g topically once daily.    HYDROcodone-acetaminophen (NORCO) 5-325 mg per tablet Take 1 tablet by mouth every 6 (six) hours as needed.    lisinopriL (PRINIVIL,ZESTRIL) 20 MG tablet Take 20 mg by mouth 2 (two) times a day.    metFORMIN (GLUCOPHAGE) 500 MG tablet Take 500 mg by mouth 2 (two) times daily with meals.    metoprolol succinate (TOPROL-XL) 200 MG 24 hr tablet Take 200 mg by mouth once daily.    omeprazole (PRILOSEC) 20 MG capsule Take 20 mg by mouth once daily.     Family History    None       Tobacco Use    Smoking status: Never    Smokeless tobacco: Never   Substance and Sexual Activity    Alcohol use: Not Currently    Drug use: Not on file    Sexual activity: Not on file     Review of Systems   Constitutional:  Positive for activity change, appetite change and fatigue. Negative for chills, diaphoresis and fever.   HENT:  Negative for congestion, rhinorrhea, sinus pressure and sinus pain.    Eyes:  Negative for discharge and visual disturbance.   Respiratory:  Negative for choking, chest tightness, shortness of breath and wheezing.    Cardiovascular:  Negative for chest pain, palpitations and leg swelling.   Gastrointestinal:  Negative for abdominal pain, constipation, diarrhea, nausea and vomiting.   Genitourinary:  Positive for flank pain (right). Negative for dysuria.   Musculoskeletal:  Positive for arthralgias and myalgias.   Neurological:  Negative for  dizziness, light-headedness and headaches.   Psychiatric/Behavioral:  Positive for confusion and decreased concentration. The patient is not nervous/anxious.      Objective:     Vital Signs (Most Recent):  Temp: 98.7 °F (37.1 °C) (06/08/24 0551)  Pulse: 102 (06/08/24 0534)  Resp: 20 (06/08/24 0534)  BP: (!) 184/87 (06/08/24 0534)  SpO2: 97 % (06/08/24 0534) Vital Signs (24h Range):  Temp:  [97.7 °F (36.5 °C)-103.4 °F (39.7 °C)] 98.7 °F (37.1 °C)  Pulse:  [] 102  Resp:  [17-24] 20  SpO2:  [94 %-100 %] 97 %  BP: (110-185)/() 184/87     Weight: 79 kg (174 lb 2.6 oz)  Body mass index is 28.98 kg/m².     Physical Exam  Vitals and nursing note reviewed.   Constitutional:       General: She is not in acute distress.     Appearance: Normal appearance. She is obese. She is ill-appearing. She is not toxic-appearing or diaphoretic.   HENT:      Head: Normocephalic and atraumatic.      Nose: Nose normal. No congestion or rhinorrhea.      Mouth/Throat:      Mouth: Mucous membranes are dry.      Pharynx: Oropharynx is clear.   Eyes:      General: No scleral icterus.     Extraocular Movements: Extraocular movements intact.      Conjunctiva/sclera: Conjunctivae normal.      Pupils: Pupils are equal, round, and reactive to light.   Cardiovascular:      Rate and Rhythm: Regular rhythm. Tachycardia present.      Pulses: Normal pulses.      Heart sounds: No murmur heard.     No friction rub. No gallop.   Pulmonary:      Effort: Pulmonary effort is normal. No respiratory distress.      Breath sounds: Normal breath sounds. No wheezing, rhonchi or rales.   Abdominal:      General: Bowel sounds are normal. There is no distension.      Palpations: Abdomen is soft.      Tenderness: There is no abdominal tenderness. There is right CVA tenderness. There is no left CVA tenderness, guarding or rebound.   Musculoskeletal:         General: No swelling. Normal range of motion.      Cervical back: Normal range of motion and neck supple.       Right lower leg: No edema.      Left lower leg: No edema.   Skin:     General: Skin is warm.      Capillary Refill: Capillary refill takes less than 2 seconds.   Neurological:      General: No focal deficit present.      Mental Status: She is alert and oriented to person, place, and time.      Cranial Nerves: No cranial nerve deficit.      Coordination: Coordination normal.   Psychiatric:         Mood and Affect: Mood normal.         Behavior: Behavior normal.              CRANIAL NERVES     CN III, IV, VI   Pupils are equal, round, and reactive to light.       Significant Labs: All pertinent labs within the past 24 hours have been reviewed.  Recent Lab Results  (Last 5 results in the past 24 hours)        06/08/24  0536   06/08/24  0532   06/08/24  0531   06/07/24  2222   06/07/24  2209        Respiratory Infection Panel Source       NP Swab         Procalcitonin   0.04  Comment: A concentration < 0.25 ng/mL represents a low risk of bacterial   infection.  Procalcitonin may not be accurate among patients with localized   infection, recent trauma or major surgery, immunosuppressed state,   invasive fungal infection, renal dysfunction. Decisions regarding   initiation or continuation of antibiotic therapy should not be based   solely on procalcitonin levels.                  0.04  Comment: A concentration < 0.25 ng/mL represents a low risk of bacterial   infection.  Procalcitonin may not be accurate among patients with localized   infection, recent trauma or major surgery, immunosuppressed state,   invasive fungal infection, renal dysfunction. Decisions regarding   initiation or continuation of antibiotic therapy should not be based   solely on procalcitonin levels.               Albumin   3.2             ALP   58             ALT   12             Anion Gap   12             Appearance, UA Clear               AST   15             Baso #   0.03             Basophil %   0.3             Bilirubin (UA) Negative                BILIRUBIN TOTAL   1.0  Comment: For infants and newborns, interpretation of results should be based  on gestational age, weight and in agreement with clinical  observations.    Premature Infant recommended reference ranges:  Up to 24 hours.............<8.0 mg/dL  Up to 48 hours............<12.0 mg/dL  3-5 days..................<15.0 mg/dL  6-29 days.................<15.0 mg/dL               BUN   11             Calcium   8.9             Chloride   107             CO2   22             Color, UA Colorless               Creatinine   0.9             Differential Method   Automated             eGFR   >60             Eos #   0.0             Eos %   0.2             Glucose   149             Glucose, UA Trace               Gran # (ANC)   6.7             Gran %   66.8             Hematocrit   43.4             Hemoglobin   13.5             Immature Grans (Abs)   0.04  Comment: Mild elevation in immature granulocytes is non specific and   can be seen in a variety of conditions including stress response,   acute inflammation, trauma and pregnancy. Correlation with other   laboratory and clinical findings is essential.               Immature Granulocytes   0.4             Ketones, UA Negative               Lactic Acid Level   1.9  Comment: Falsely low lactic acid results can be found in samples   containing >=13.0 mg/dL total bilirubin and/or >=3.5 mg/dL   direct bilirubin.               Leukocyte Esterase, UA Negative               Lymph #   2.2             Lymph %   21.9             MCH   27.8             MCHC   31.1             MCV   90             Mono #   1.0             Mono %   10.4             MPV   10.4             NITRITE UA Negative               nRBC   0             Blood, UA Negative               pH, UA 7.0               Platelet Count   206             POCT Glucose     158     105       Potassium   3.9             PROTEIN TOTAL   7.5             Protein, UA Negative  Comment: Recommend a 24 hour urine  protein or a urine   protein/creatinine ratio if globulin induced proteinuria is  clinically suspected.                 RBC   4.85             RDW   13.4             Sodium   141             Spec Grav UA 1.015               Specimen UA Urine, Clean Catch               UROBILINOGEN UA Negative               WBC   10.02                                    Significant Imaging:   CT Abdomen Pelvis With IV Contrast NO Oral Contrast  Order: [id number]  Status: Final result       Visible to patient: No (inaccessible in Patient Portal)       Next appt: None    0 Result Notes  Details    Reading Physician Reading Date Result Priority   Aury Rosales MD  [phone] 6/7/2024 STAT     Narrative & Impression  EXAMINATION:  CT OF ABDOMEN PELVIS WITH     CLINICAL HISTORY:  Abdominal abscess/infection suspected;Flank pain, kidney stone suspected;Right flank pain and high fevers;     TECHNIQUE:  5 mm enhanced axial images were obtained from the lung bases through the greater trochanters.  Seventy-five mL of Omnipaque 350 was injected.     COMPARISON:  None.     FINDINGS:  There is fatty infiltration of the liver.     Moderate right hydroureteronephrosis and mild left hydroureteronephrosis is seen.  There is moderate distension of the urinary bladder without wall thickening.  No nephrolithiasis is present.  A too small to characterize low attenuation lesion is seen in the right renal cortex, which may represent a subcentimeter cyst.     Spleen, pancreas, and adrenal glands are unremarkable. The gallbladder contains layering gallstones and/or gallbladder sludge..     There is no definite evidence for abdominal adenopathy or ascites.     There are no pelvic masses or adenopathy.     There is no free fluid in the pelvis.     At the lung bases, there is a small hiatal hernia containing tiny fluid.  There is mild bibasilar atelectasis or scarring.     There is diffuse idiopathic skeletal hyperostoses.  Superior endplate  irregularity is seen at L5.  Vacuum phenomena is seen at L2/L3 and L3/L4.     Impression:     Bilateral hydroureteronephrosis and moderate urinary bladder distension.  No nephrolithiasis.  Findings may be related to obstructive uropathy.     Hepatic steatosis.     Cholelithiasis and or a small gallbladder sludge.     Small hiatal hernia.        Electronically signed by:Aury Rosales  Date:                                            06/07/2024  Time:                                           22:20   CT Head Without Contrast  Order: [id number]  Status: Final result       Visible to patient: No (inaccessible in Patient Portal)       Next appt: None       Dx: Headache in front of head    0 Result Notes  Details    Reading Physician Reading Date Result Priority   Dre Obrien DO  [phone]  [phone] 6/7/2024 STAT     Narrative & Impression  EXAMINATION:  CT HEAD WITHOUT CONTRAST     CLINICAL HISTORY:  Headache, new or worsening (Age >= 50y);  Headache, unspecified     TECHNIQUE:  Multiple sequential 5 mm axial images of the head without contrast.  Coronal and sagittal reformatted imaging from the axial acquisition.     COMPARISON:  None     FINDINGS:  There is no evidence for acute intracranial hemorrhage or sulcal effacement.  The ventricles are normal in size without hydrocephalus.  There is mild ill-defined decreased duration supratentorial white matter which is nonspecific and may be sequela of mild underlying chronic ischemic change.  Incidental basal gangliar calcifications identified     There is no midline shift or mass effect.  Visualized paranasal sinuses and mastoid air cells are clear.     Impression:     Unremarkable noncontrast CT head as detailed above specifically without evidence for acute intracranial hemorrhage.  Clinical correlation and further evaluation as warranted.        Electronically signed by:Dre Obrien DO  Date:                                            06/07/2024  Time:                                            15:27   X-Ray Chest AP Portable  Order: [id number]  Status: Final result       Visible to patient: No (inaccessible in Patient Portal)       Next appt: None    0 Result Notes  Details    Reading Physician Reading Date Result Priority   Brian Carrington MD  [phone] 6/7/2024 STAT     Narrative & Impression  EXAMINATION:  XR CHEST AP PORTABLE     CLINICAL HISTORY:  Sepsis;     TECHNIQUE:  Single frontal view of the chest was performed.     COMPARISON:  09/14/2022     FINDINGS:  The cardiomediastinal silhouette is not enlarged noting calcification of the aorta..  There is no pleural effusion.  The trachea is midline.  The lungs are symmetrically expanded bilaterally with coarse interstitial attenuation.  There is a calcific focus projected over the right upper lung zone, stable.  There is bilateral basilar subsegmental atelectasis or scarring, left greater than right..  No large focal consolidation seen.  There is no pneumothorax.  The osseous structures are remarkable for degenerative change..     Impression:     1. Chronic appearing interstitial findings, no large focal consolidation.        Electronically signed by:Brian Carrington MD  Date:                                            06/07/2024  Time:                                           12:01

## 2024-06-08 NOTE — CONSULTS
Inpatient Radiology Pre-procedure Note    History of Present Illness:  Sussy Fulton is a 79 y.o. female who presents for lumbar puncture secoindary to concerns of meningitis.    Admission H&P reviewed.  Past Medical History:   Diagnosis Date    Diabetes mellitus     Hypertension      No past surgical history on file.    Review of Systems:   As documented in primary team H&P    Home Meds:   Prior to Admission medications    Medication Sig Start Date End Date Taking? Authorizing Provider   atorvastatin (LIPITOR) 10 MG tablet Take 10 mg by mouth once daily. Patient doesn't know the dose    Provider, Historical   cetirizine (ZYRTEC) 10 MG tablet Take 10 mg by mouth once daily.    Provider, Historical   diclofenac sodium (VOLTAREN) 1 % Gel Apply 2 g topically once daily. 9/15/22   Carmela Johnson MD   HYDROcodone-acetaminophen (NORCO) 5-325 mg per tablet Take 1 tablet by mouth every 6 (six) hours as needed. 9/1/19   Johann Valdez DNP   lisinopriL (PRINIVIL,ZESTRIL) 20 MG tablet Take 20 mg by mouth 2 (two) times a day.    Provider, Historical   metFORMIN (GLUCOPHAGE) 500 MG tablet Take 500 mg by mouth 2 (two) times daily with meals.    Provider, Historical   metoprolol succinate (TOPROL-XL) 200 MG 24 hr tablet Take 200 mg by mouth once daily.    Provider, Historical   omeprazole (PRILOSEC) 20 MG capsule Take 20 mg by mouth once daily.    Provider, Historical     Scheduled Meds:    acyclovir  10 mg/kg (Ideal) Intravenous Q8H    ampicillin IV (PEDS and ADULTS)  2 g Intravenous Q6H    cefTRIAXone (Rocephin) IV (PEDS and ADULTS)  2 g Intravenous Q12H    enoxparin  40 mg Subcutaneous Daily    polyethylene glycol  17 g Oral Daily    sodium chloride 0.9%  10 mL Intravenous Q6H    vancomycin (VANCOCIN) IV (PEDS and ADULTS)  1,000 mg Intravenous Q24H     Continuous Infusions:    sodium chloride 0.9%   Intravenous Continuous         PRN Meds:  Current Facility-Administered Medications:     acetaminophen, 650 mg,  "Rectal, Q6H PRN    acetaminophen, 650 mg, Oral, Q4H PRN    albuterol-ipratropium, 3 mL, Nebulization, Q4H PRN    aluminum-magnesium hydroxide-simethicone, 30 mL, Oral, QID PRN    dextrose 10%, 12.5 g, Intravenous, PRN    dextrose 10%, 25 g, Intravenous, PRN    glucagon (human recombinant), 1 mg, Intramuscular, PRN    glucose, 16 g, Oral, PRN    glucose, 24 g, Oral, PRN    hydrALAZINE, 5 mg, Intravenous, Q6H PRN    HYDROcodone-acetaminophen, 1 tablet, Oral, Q8H PRN    insulin aspart U-100, 0-5 Units, Subcutaneous, QID (AC + HS) PRN    melatonin, 6 mg, Oral, Nightly PRN    naloxone, 0.02 mg, Intravenous, PRN    ondansetron, 4 mg, Intravenous, Q6H PRN    simethicone, 1 tablet, Oral, QID PRN    sodium chloride 0.9%, 10 mL, Intravenous, Q12H PRN    Flushing PICC/Midline Protocol, , , Until Discontinued **AND** sodium chloride 0.9%, 10 mL, Intravenous, Q6H **AND** sodium chloride 0.9%, 10 mL, Intravenous, PRN    Pharmacy to dose Vancomycin consult, , , Once **AND** vancomycin - pharmacy to dose, , Intravenous, pharmacy to manage frequency  Anticoagulants/Antiplatelets: no anticoagulation    Allergies: Review of patient's allergies indicates:  No Known Allergies  Sedation Hx: have not been any systemic reactions    Labs:  No results for input(s): "INR", "PT", "PTT" in the last 168 hours.    Recent Labs   Lab 06/08/24  0532   WBC 10.02   HGB 13.5   HCT 43.4   MCV 90         Recent Labs   Lab 06/07/24  1125 06/08/24  0532   * 149*    141   K 4.1 3.9    107   CO2 25 22*   BUN 15 11   CREATININE 1.2 0.9   CALCIUM 10.1 8.9   MG 1.2*  --    ALT 13 12   AST 17 15   ALBUMIN 3.7 3.2*   BILITOT 0.7 1.0         Vitals:  Temp: (!) 103.1 °F (39.5 °C) (06/08/24 0816)  Pulse: (!) 126 (06/08/24 1107)  Resp: 20 (06/08/24 0816)  BP: 139/73 (06/08/24 0816)  SpO2: 97 % (06/08/24 0835)     Physical Exam:  ASA: 3  Mallampati: 3    General: no acute distress  Mental Status: alert and oriented to person, place and " "time  HEENT: normocephalic, atraumatic  Chest: unlabored breathing  Heart: regular heart rate  Abdomen: nondistended  Extremity: moves all extremities    Plan: lumbar puncture  Sedation Plan: local    Maurisio Adan MD ([facility])  Interventional Radiology          "

## 2024-06-08 NOTE — ASSESSMENT & PLAN NOTE
78y/o F with weakness, malaise and fevers x 1 day. Source remains unclear. UA bland, no leukocytosis, CTH wnl, CT a/p with hydroureteronephrosis (no obstructing stones s/p robison)otherwise unremarkable. Noted to have neck rigidity and worsening encephalopathy concerning for meningitis. Currently on vanc, ceftriaxone, ampicillin and acyclovir.     Recommendations:  -Agree with empiric meningitis coverage pending LP: ampicillin, vanc, ceftriaxone, acyclovir  -Please send for csf cell count with diff, protein, glucose, gram stain, culture, HSV PCR, VZV PCR, [place], meningitis/ encephalitis panel, VDRL, etc  -HIV, treponema ab as part of encephalopathy work up  -med rec to rule out other causes of high fever such as NMS and serotonin syndrome  -agree with EEG    Further recs pending formal evaluation of patient.

## 2024-06-08 NOTE — HPI
"78y/o F pt w hx of HTN, HLD, T2DM  presented to the ER with family due to weakness x1 day and not feeling well and was admitted 6/7 for presumed sepsis with unclear source.    Pt denied nausea, vomiting, diarrhea, shortness breath or cough.  States that she started feeling bad after eating Chinese food with her family last night.  Denies sick contacts.      In the ED  pt was febrile to 103°.  Labs remarkable for wbc 11.8, LA wnl, UA bland,  COVID neg.  Started on empiric vanc and Zosyn. CT head unremarkable. CT of the abd/pel revealed: "Bilateral hydroureteronephrosis and moderate urinary bladder distension.  No nephrolithiasis.  Findings may be related to obstructive uropathy." Urology consulted and recommended robison placement and continued IV antibiotics.  Culture sent from the Robison placed and pending.    Hospital course c/b fevers despite bs abx and worsening acute encephalopathy prompting transfer to icu and broadening of abx to vacn, ceftriaxone, ampicillin and acyclovir for meningitis coverage.    ID consulted for: "103F despite V+Z, Stiff neck, shaking, Alterned mental status, Alert but not following any commands, not tracking staring blank. ?neuorgenic bladder w Hydroneph- no bacterio or WBC in urine "    Pt reports recent sinusitis 1 wk prior for which she received a steroid injection and albuterol. Notes new b/l lower back pain on day of presentation, but otherwise no new symptoms. Denies sick contacts, animal exposures, recent travel or recent surgeries/ dental procedures. Does not work. Lives with her  who is a dialysis pt and her son. Denies having eaten any unpasteurized dairy products. Ate chinese food tues, but nobody else got sick.   "

## 2024-06-08 NOTE — SUBJECTIVE & OBJECTIVE
"HPI:  80y/o F pt w hx of HTN, HLD, T2DM  presented to the ER with family due to weakness x1 day and not feeling well and was admitted 6/7 for presumed sepsis with unclear source.     Pt denied nausea, vomiting, diarrhea, shortness breath or cough.  States that she started feeling bad after eating Chinese food with her family last night.  Denies sick contacts.       In the ED  pt was febrile to 103°.  Labs remarkable for wbc 11.8, LA wnl, UA bland,  COVID neg.  Started on empiric vanc and Zosyn. CT head unremarkable. CT of the abd/pel revealed: "Bilateral hydroureteronephrosis and moderate urinary bladder distension.  No nephrolithiasis.  Findings may be related to obstructive uropathy." Urology consulted and recommended robison placement and continued IV antibiotics.  Culture sent from the Robison placed and pending.     Hospital course c/b fevers despite bs abx and acute encephalopathy prompting transfer to icu and broadening of abx to vacn, caefteriaxone, ampicillin and acyclovir for meningitis coverage.     ID consulted for: "103F despite V+Z, Stiff neck, shaking, Alterned mental status, Alert but not following any commands, not tracking staring blankl. ?neuorgenic bladder w Hydroneph- no bacterio or WBC in urine "     Patient had LP performed with WBC>500 and elevated protein with lymphocytosis. Patient will also be started for empiric fungal coverage.    Per sisters at bedside, patient would have staring spells yesterday. She is improved today. Patient is oriented x 3 currently and is drowsy. Patient is currently connected to EEG.      Images personally reviewed and interpreted:  Study: Head CT  Study Interpretation: unremarkable     Additional studies reviewed:   Study: Cardiac_Neuro: EEG and LP results  Study Interpretation:  LP- WBC-  560, protein 223. Culture and m/e panel pending. EEG currently on.     Laboratory studies reviewed:  BMP:   Lab Results   Component Value Date     06/08/2024    K 3.9 " "06/08/2024     06/08/2024    CO2 22 (L) 06/08/2024    BUN 11 06/08/2024    CREATININE 0.9 06/08/2024    CALCIUM 8.9 06/08/2024     CBC:   Lab Results   Component Value Date    WBC 10.02 06/08/2024    RBC 4.85 06/08/2024    HGB 13.5 06/08/2024    HCT 43.4 06/08/2024     06/08/2024    MCV 90 06/08/2024    MCH 27.8 06/08/2024    MCHC 31.1 (L) 06/08/2024     Lipid Panel:   Lab Results   Component Value Date    CHOL 161 09/14/2022    LDLCALC 70.0 09/14/2022    HDL 45 09/14/2022    TRIG 230 (H) 09/14/2022     Coagulation: No results found for: "PT", "INR", "APTT"  Hgb A1C:   Lab Results   Component Value Date    HGBA1C 9.2 (H) 09/14/2022     TSH:   Lab Results   Component Value Date    TSH 0.223 (L) 06/07/2024       Documentation personally reviewed:  Notes: Notes: ED notes, H&P, and Consult notes from: ID     Assessment and plan:  Patient with history as above with suspected meningitis.  CSF studies suggestive of infectious process.  Appreciate Infectious Disease recommendations with empiric coverage adding fungal coverage.  EEG currently on going.  We would recommend starting empiric Keppra 500 mg bid. MRI brain w/wo contrast.     Keppra 500 mg bid  EEG ongoing  MRI brain w/wo contrast  F/u ID recs, CSF results    No further neurology recs. Please reconsult neurology if EEG and/or MRI is abnormal.     Post charge discharge plan:  Clinic follow up: General Neurology and Epilepsy    Visit Type: in-person only  Timeframe: 2 weeks      "

## 2024-06-08 NOTE — HPI
79 y.o.  female with a past medical history significant for GERD, hypertension, hyperlipidemia and non-insulin-dependent type 2 diabetes presents to the ER with family due to weakness x1 day and not feeling well.  Patient denies any nausea any vomiting any diarrhea.  Patient denies any shortness breath or cough.  States that she started feeling bad after eating Chinese food with her family last night.  No one else in the family is feeling sick.  While she was in the ER was noted to have a temperature of a 103°.  Labs noted for white count of 11.8.  Lactic was normal.  Troponins were negative.  Urine was negative for any UTI.  COVID was negative chemistry was overall unremarkable.  Cultures were sent and she was started on vanc and Zosyn.  We were consulted for further evaluation and management of fever of unknown origin.  CT head was done and also negative for any mass or bleed.    On exam patient was noted to be tender on her right flank.  CT of the abdomen and pelvis with IV contrast was ordered for possible stone versus pyelo versus abscess.  CT of the abdomen and pelvis noted: Impression: Bilateral hydroureteronephrosis and moderate urinary bladder distension.  No nephrolithiasis.  Findings may be related to obstructive uropathy. Hepatic steatosis. Cholelithiasis and or a small gallbladder sludge.Small hiatal hernia. Consult urology was placed.  I contacted Urology to let them know the consulted to discuss possibilities of an infected stone.  Urology reviewed the report with me and no stone was mentioned.  Recommended Muhammad placement and continued IV antibiotics.  We will follow up with her.  Culture sent from the Muhammad placed and pending.

## 2024-06-08 NOTE — ASSESSMENT & PLAN NOTE
CC scans psych etiology to be a urinary source though her urine so far is negative.  We will repeat her UA and send a culture.  Continue on vanc and Zosyn.  No signs of sepsis at this time.

## 2024-06-08 NOTE — PLAN OF CARE
Problem: Adult Inpatient Plan of Care  Goal: Optimal Comfort and Wellbeing  Outcome: Progressing  Goal: Readiness for Transition of Care  Outcome: Progressing     Problem: Diabetes Comorbidity  Goal: Blood Glucose Level Within Targeted Range  Outcome: Progressing  Intervention: Monitor and Manage Glycemia  Flowsheets (Taken 6/8/2024 4829)  Glycemic Management: blood glucose monitored

## 2024-06-08 NOTE — ASSESSMENT & PLAN NOTE
Chronic, controlled. Latest blood pressure and vitals reviewed-     Temp:  [97.7 °F (36.5 °C)-103.4 °F (39.7 °C)]   Pulse:  []   Resp:  [17-24]   BP: (110-185)/()   SpO2:  [94 %-100 %] .   Home meds for hypertension were reviewed and noted below.   Hypertension Medications               lisinopriL (PRINIVIL,ZESTRIL) 20 MG tablet Take 20 mg by mouth 2 (two) times a day.    metoprolol succinate (TOPROL-XL) 200 MG 24 hr tablet Take 200 mg by mouth once daily.            While in the hospital, will manage blood pressure as follows; Continue home antihypertensive regimen    Will utilize p.r.n. blood pressure medication only if patient's blood pressure greater than 180/110 and she develops symptoms such as worsening chest pain or shortness of breath.

## 2024-06-08 NOTE — NURSING
[facility] [place]  Rapid Response Nurse Intervention/ Task    Date of Visit: 06/08/2024  Time of Visit: 0800       INTERVENTIONS/ TASK Completed:     I went to bedside . Pt confused and has temp of 103. Pt already received tylenol earlier in AM. I discussed with Esther OLEARY. I will con't to monitor.Pt came in with fever of unknown origin.

## 2024-06-08 NOTE — H&P
[facility] Emergency [facility] Medicine  History & Physical    Patient Name: Sussy Fulton  MRN: [id number]  Patient Class: IP- Inpatient  Admission Date: 6/7/2024  Attending Physician: Dr. Margaret Espitia   Primary Care Provider: Arlen Rivera MD         Patient information was obtained from patient, relative(s), past medical records, and ER records.     Subjective:     Principal Problem:Fever, unknown origin    Chief Complaint:   Chief Complaint   Patient presents with    Fatigue     Reports waking up this morning with generalized weakness. Reports cough that started last night. Reports lower back pain with cough. Denies chest pain, sob, dizziness,N/V.         HPI: 79 y.o.  female with a past medical history significant for GERD, hypertension, hyperlipidemia and non-insulin-dependent type 2 diabetes presents to the ER with family due to weakness x1 day and not feeling well.  Patient denies any nausea any vomiting any diarrhea.  Patient denies any shortness breath or cough.  States that she started feeling bad after eating Chinese food with her family last night.  No one else in the family is feeling sick.  While she was in the ER was noted to have a temperature of a 103°.  Labs noted for white count of 11.8.  Lactic was normal.  Troponins were negative.  Urine was negative for any UTI.  COVID was negative chemistry was overall unremarkable.  Cultures were sent and she was started on vanc and Zosyn.  We were consulted for further evaluation and management of fever of unknown origin.  CT head was done and also negative for any mass or bleed.    On exam patient was noted to be tender on her right flank.  CT of the abdomen and pelvis with IV contrast was ordered for possible stone versus pyelo versus abscess.  CT of the abdomen and pelvis noted:  Impression:  Bilateral hydroureteronephrosis and moderate urinary bladder distension.  No nephrolithiasis.  Findings may be related to  obstructive uropathy.  Hepatic steatosis.   Cholelithiasis and or a small gallbladder sludge.  Small hiatal hernia.       Consult urology was placed.  I contacted Urology to let them know the consulted to discuss possibilities of an infected stone.  Urology reviewed the report with me and no stone was mentioned.  Recommended Muhammad placement and continued IV antibiotics.  We will follow up with her.  Culture sent from the Muhammad placed and pending.      Past Medical History:   Diagnosis Date    Diabetes mellitus     Hypertension        No past surgical history on file.    Review of patient's allergies indicates:  No Known Allergies    No current facility-administered medications on file prior to encounter.     Current Outpatient Medications on File Prior to Encounter   Medication Sig    atorvastatin (LIPITOR) 10 MG tablet Take 10 mg by mouth once daily. Patient doesn't know the dose    cetirizine (ZYRTEC) 10 MG tablet Take 10 mg by mouth once daily.    diclofenac sodium (VOLTAREN) 1 % Gel Apply 2 g topically once daily.    HYDROcodone-acetaminophen (NORCO) 5-325 mg per tablet Take 1 tablet by mouth every 6 (six) hours as needed.    lisinopriL (PRINIVIL,ZESTRIL) 20 MG tablet Take 20 mg by mouth 2 (two) times a day.    metFORMIN (GLUCOPHAGE) 500 MG tablet Take 500 mg by mouth 2 (two) times daily with meals.    metoprolol succinate (TOPROL-XL) 200 MG 24 hr tablet Take 200 mg by mouth once daily.    omeprazole (PRILOSEC) 20 MG capsule Take 20 mg by mouth once daily.     Family History    None       Tobacco Use    Smoking status: Never    Smokeless tobacco: Never   Substance and Sexual Activity    Alcohol use: Not Currently    Drug use: Not on file    Sexual activity: Not on file     Review of Systems   Constitutional:  Positive for activity change, appetite change and fatigue. Negative for chills, diaphoresis and fever.   HENT:  Negative for congestion, rhinorrhea, sinus pressure and sinus pain.    Eyes:  Negative for  discharge and visual disturbance.   Respiratory:  Negative for choking, chest tightness, shortness of breath and wheezing.    Cardiovascular:  Negative for chest pain, palpitations and leg swelling.   Gastrointestinal:  Negative for abdominal pain, constipation, diarrhea, nausea and vomiting.   Genitourinary:  Positive for flank pain (right). Negative for dysuria.   Musculoskeletal:  Positive for arthralgias and myalgias.   Neurological:  Negative for dizziness, light-headedness and headaches.   Psychiatric/Behavioral:  Positive for confusion and decreased concentration. The patient is not nervous/anxious.      Objective:     Vital Signs (Most Recent):  Temp: 98.7 °F (37.1 °C) (06/08/24 0551)  Pulse: 102 (06/08/24 0534)  Resp: 20 (06/08/24 0534)  BP: (!) 184/87 (06/08/24 0534)  SpO2: 97 % (06/08/24 0534) Vital Signs (24h Range):  Temp:  [97.7 °F (36.5 °C)-103.4 °F (39.7 °C)] 98.7 °F (37.1 °C)  Pulse:  [] 102  Resp:  [17-24] 20  SpO2:  [94 %-100 %] 97 %  BP: (110-185)/() 184/87     Weight: 79 kg (174 lb 2.6 oz)  Body mass index is 28.98 kg/m².     Physical Exam  Vitals and nursing note reviewed.   Constitutional:       General: She is not in acute distress.     Appearance: Normal appearance. She is obese. She is ill-appearing. She is not toxic-appearing or diaphoretic.   HENT:      Head: Normocephalic and atraumatic.      Nose: Nose normal. No congestion or rhinorrhea.      Mouth/Throat:      Mouth: Mucous membranes are dry.      Pharynx: Oropharynx is clear.   Eyes:      General: No scleral icterus.     Extraocular Movements: Extraocular movements intact.      Conjunctiva/sclera: Conjunctivae normal.      Pupils: Pupils are equal, round, and reactive to light.   Cardiovascular:      Rate and Rhythm: Regular rhythm. Tachycardia present.      Pulses: Normal pulses.      Heart sounds: No murmur heard.     No friction rub. No gallop.   Pulmonary:      Effort: Pulmonary effort is normal. No respiratory  distress.      Breath sounds: Normal breath sounds. No wheezing, rhonchi or rales.   Abdominal:      General: Bowel sounds are normal. There is no distension.      Palpations: Abdomen is soft.      Tenderness: There is no abdominal tenderness. There is right CVA tenderness. There is no left CVA tenderness, guarding or rebound.   Musculoskeletal:         General: No swelling. Normal range of motion.      Cervical back: Normal range of motion and neck supple.      Right lower leg: No edema.      Left lower leg: No edema.   Skin:     General: Skin is warm.      Capillary Refill: Capillary refill takes less than 2 seconds.   Neurological:      General: No focal deficit present.      Mental Status: She is alert and oriented to person, place, and time.      Cranial Nerves: No cranial nerve deficit.      Coordination: Coordination normal.   Psychiatric:         Mood and Affect: Mood normal.         Behavior: Behavior normal.              CRANIAL NERVES     CN III, IV, VI   Pupils are equal, round, and reactive to light.       Significant Labs: All pertinent labs within the past 24 hours have been reviewed.  Recent Lab Results  (Last 5 results in the past 24 hours)        06/08/24  0536   06/08/24  0532   06/08/24  0531   06/07/24  2222   06/07/24  2209        Respiratory Infection Panel Source       NP Swab         Procalcitonin   0.04  Comment: A concentration < 0.25 ng/mL represents a low risk of bacterial   infection.  Procalcitonin may not be accurate among patients with localized   infection, recent trauma or major surgery, immunosuppressed state,   invasive fungal infection, renal dysfunction. Decisions regarding   initiation or continuation of antibiotic therapy should not be based   solely on procalcitonin levels.                  0.04  Comment: A concentration < 0.25 ng/mL represents a low risk of bacterial   infection.  Procalcitonin may not be accurate among patients with localized   infection, recent trauma or  major surgery, immunosuppressed state,   invasive fungal infection, renal dysfunction. Decisions regarding   initiation or continuation of antibiotic therapy should not be based   solely on procalcitonin levels.               Albumin   3.2             ALP   58             ALT   12             Anion Gap   12             Appearance, UA Clear               AST   15             Baso #   0.03             Basophil %   0.3             Bilirubin (UA) Negative               BILIRUBIN TOTAL   1.0  Comment: For infants and newborns, interpretation of results should be based  on gestational age, weight and in agreement with clinical  observations.    Premature Infant recommended reference ranges:  Up to 24 hours.............<8.0 mg/dL  Up to 48 hours............<12.0 mg/dL  3-5 days..................<15.0 mg/dL  6-29 days.................<15.0 mg/dL               BUN   11             Calcium   8.9             Chloride   107             CO2   22             Color, UA Colorless               Creatinine   0.9             Differential Method   Automated             eGFR   >60             Eos #   0.0             Eos %   0.2             Glucose   149             Glucose, UA Trace               Gran # (ANC)   6.7             Gran %   66.8             Hematocrit   43.4             Hemoglobin   13.5             Immature Grans (Abs)   0.04  Comment: Mild elevation in immature granulocytes is non specific and   can be seen in a variety of conditions including stress response,   acute inflammation, trauma and pregnancy. Correlation with other   laboratory and clinical findings is essential.               Immature Granulocytes   0.4             Ketones, UA Negative               Lactic Acid Level   1.9  Comment: Falsely low lactic acid results can be found in samples   containing >=13.0 mg/dL total bilirubin and/or >=3.5 mg/dL   direct bilirubin.               Leukocyte Esterase, UA Negative               Lymph #   2.2             Lymph %    21.9             MCH   27.8             MCHC   31.1             MCV   90             Mono #   1.0             Mono %   10.4             MPV   10.4             NITRITE UA Negative               nRBC   0             Blood, UA Negative               pH, UA 7.0               Platelet Count   206             POCT Glucose     158     105       Potassium   3.9             PROTEIN TOTAL   7.5             Protein, UA Negative  Comment: Recommend a 24 hour urine protein or a urine   protein/creatinine ratio if globulin induced proteinuria is  clinically suspected.                 RBC   4.85             RDW   13.4             Sodium   141             Spec Grav UA 1.015               Specimen UA Urine, Clean Catch               UROBILINOGEN UA Negative               WBC   10.02                                    Significant Imaging:   CT Abdomen Pelvis With IV Contrast NO Oral Contrast  Order: [id number]  Status: Final result       Visible to patient: No (inaccessible in Patient Portal)       Next appt: None    0 Result Notes  Details    Reading Physician Reading Date Result Priority   Aury Rosales MD  [phone] 6/7/2024 STAT     Narrative & Impression  EXAMINATION:  CT OF ABDOMEN PELVIS WITH     CLINICAL HISTORY:  Abdominal abscess/infection suspected;Flank pain, kidney stone suspected;Right flank pain and high fevers;     TECHNIQUE:  5 mm enhanced axial images were obtained from the lung bases through the greater trochanters.  Seventy-five mL of Omnipaque 350 was injected.     COMPARISON:  None.     FINDINGS:  There is fatty infiltration of the liver.     Moderate right hydroureteronephrosis and mild left hydroureteronephrosis is seen.  There is moderate distension of the urinary bladder without wall thickening.  No nephrolithiasis is present.  A too small to characterize low attenuation lesion is seen in the right renal cortex, which may represent a subcentimeter cyst.     Spleen, pancreas, and adrenal glands are  unremarkable. The gallbladder contains layering gallstones and/or gallbladder sludge..     There is no definite evidence for abdominal adenopathy or ascites.     There are no pelvic masses or adenopathy.     There is no free fluid in the pelvis.     At the lung bases, there is a small hiatal hernia containing tiny fluid.  There is mild bibasilar atelectasis or scarring.     There is diffuse idiopathic skeletal hyperostoses.  Superior endplate irregularity is seen at L5.  Vacuum phenomena is seen at L2/L3 and L3/L4.     Impression:     Bilateral hydroureteronephrosis and moderate urinary bladder distension.  No nephrolithiasis.  Findings may be related to obstructive uropathy.     Hepatic steatosis.     Cholelithiasis and or a small gallbladder sludge.     Small hiatal hernia.        Electronically signed by:Aury Rosales  Date:                                            06/07/2024  Time:                                           22:20   CT Head Without Contrast  Order: [id number]  Status: Final result       Visible to patient: No (inaccessible in Patient Portal)       Next appt: None       Dx: Headache in front of head    0 Result Notes  Details    Reading Physician Reading Date Result Priority   Dre Obrien DO  [phone]  [phone] 6/7/2024 STAT     Narrative & Impression  EXAMINATION:  CT HEAD WITHOUT CONTRAST     CLINICAL HISTORY:  Headache, new or worsening (Age >= 50y);  Headache, unspecified     TECHNIQUE:  Multiple sequential 5 mm axial images of the head without contrast.  Coronal and sagittal reformatted imaging from the axial acquisition.     COMPARISON:  None     FINDINGS:  There is no evidence for acute intracranial hemorrhage or sulcal effacement.  The ventricles are normal in size without hydrocephalus.  There is mild ill-defined decreased duration supratentorial white matter which is nonspecific and may be sequela of mild underlying chronic ischemic change.  Incidental basal gangliar  calcifications identified     There is no midline shift or mass effect.  Visualized paranasal sinuses and mastoid air cells are clear.     Impression:     Unremarkable noncontrast CT head as detailed above specifically without evidence for acute intracranial hemorrhage.  Clinical correlation and further evaluation as warranted.        Electronically signed by:Dre Obrien DO  Date:                                            06/07/2024  Time:                                           15:27   X-Ray Chest AP Portable  Order: [id number]  Status: Final result       Visible to patient: No (inaccessible in Patient Portal)       Next appt: None    0 Result Notes  Details    Reading Physician Reading Date Result Priority   Brian Carrington MD  [phone] 6/7/2024 STAT     Narrative & Impression  EXAMINATION:  XR CHEST AP PORTABLE     CLINICAL HISTORY:  Sepsis;     TECHNIQUE:  Single frontal view of the chest was performed.     COMPARISON:  09/14/2022     FINDINGS:  The cardiomediastinal silhouette is not enlarged noting calcification of the aorta..  There is no pleural effusion.  The trachea is midline.  The lungs are symmetrically expanded bilaterally with coarse interstitial attenuation.  There is a calcific focus projected over the right upper lung zone, stable.  There is bilateral basilar subsegmental atelectasis or scarring, left greater than right..  No large focal consolidation seen.  There is no pneumothorax.  The osseous structures are remarkable for degenerative change..     Impression:     1. Chronic appearing interstitial findings, no large focal consolidation.        Electronically signed by:Brian Carrington MD  Date:                                            06/07/2024  Time:                                           12:01     Assessment/Plan:     * Fever, unknown origin  Work up for sepsis thus far negative as stated in HPI above but given CT abd/pelvis with IV contrast results concern for possible  urinary source (stone).  First UA negative but will repeat with Cath UA and send a culture.  Continue on vanc and Zosyn. Follow up on comp respiratory panel, blood cultures and repeat labs.  No signs of sepsis at this time.       Hydroureteronephrosis  Called and discussed case with Urology on-call.  We will follow up with the recommendations.  Appreciate their help.  Muhammad catheter placed and we will monitor urine output.      HLD (hyperlipidemia)  Resume statin.      DM (diabetes mellitus)  Patient's FSGs are controlled on current medication regimen.  Last A1c reviewed-   Lab Results   Component Value Date    HGBA1C 9.2 (H) 09/14/2022     Most recent fingerstick glucose reviewed-   Recent Labs   Lab 06/07/24  1446 06/07/24  2141 06/07/24  2209 06/08/24  0531   POCTGLUCOSE 136* 110 105 158*     Current correctional scale  Low  Maintain anti-hyperglycemic dose as follows-   Antihyperglycemics (From admission, onward)      Start     Stop Route Frequency Ordered    06/07/24 1955  insulin aspart U-100 pen 0-5 Units         -- SubQ Before meals & nightly PRN 06/07/24 1855          Hold Oral hypoglycemics while patient is in the hospital.  We will follow up on A1c.  Diet is adjusted.    HTN (hypertension)  Chronic, controlled. Latest blood pressure and vitals reviewed-     Temp:  [97.7 °F (36.5 °C)-103.4 °F (39.7 °C)]   Pulse:  []   Resp:  [17-24]   BP: (110-185)/()   SpO2:  [94 %-100 %] .   Home meds for hypertension were reviewed and noted below.   Hypertension Medications               lisinopriL (PRINIVIL,ZESTRIL) 20 MG tablet Take 20 mg by mouth 2 (two) times a day.    metoprolol succinate (TOPROL-XL) 200 MG 24 hr tablet Take 200 mg by mouth once daily.            While in the hospital, will manage blood pressure as follows; Continue home antihypertensive regimen    Will utilize p.r.n. blood pressure medication only if patient's blood pressure greater than 180/110 and she develops symptoms such as  worsening chest pain or shortness of breath.      VTE Risk Mitigation (From admission, onward)           Ordered     enoxaparin injection 40 mg  Daily         06/07/24 1857     IP VTE HIGH RISK PATIENT  Once         06/07/24 1857     Place sequential compression device  Until discontinued         06/07/24 1857                       Code status: Full code        AdmissionCare    Guideline: Sepsis (and Other Febrile Illness without Focal Infection) - INPT, Inpatient    Based on the indications selected for the patient, the bed status of Inpatient was determined to be MET    The following indications were selected as present at the time of evaluation of the patient:      - Parenteral antimicrobial regimen that must be implemented on inpatient basis (eg, infusion or monitoring needs beyond capabilities of outpatient parenteral therapy)    AdmissionCare documentation entered by: Adrianne Ansari    [facility], 28th edition, Copyright © 2024 [facility], [facility] All Rights Reserved.  [id number]:00    Margaret Espitia MD  Department of Hospital Medicine  [facility] [place] - Emergency Dept

## 2024-06-08 NOTE — NURSING
[facility]  Nurses Note -- 4 Eyes      6/8/2024       Skin assessed on: Q Shift      [x] No Pressure Injuries Present    [x]Prevention Measures Documented    [] Yes LDA  for Pressure Injury Previously documented     [] Yes New Pressure Injury Discovered   [] LDA for New Pressure Injury Added      Attending RN:  Lula Hutchinson LPN     Second RN:  Linnea

## 2024-06-08 NOTE — ASSESSMENT & PLAN NOTE
Called and discussed case with Urology on-call.  We will follow up with the recommendations.  Appreciate their help.  Muhammad catheter placed and we will monitor urine output.

## 2024-06-08 NOTE — PROGRESS NOTES
[facility] Medicine  Progress Note    Patient Name: Sussy Fulton  MRN: [id number]  Patient Class: IP- Inpatient   Admission Date: 6/7/2024  Length of Stay: 1 days  Attending Physician: Madhav Delgadillo MD  Primary Care Provider: Arlen Rivera MD        Subjective:     Principal Problem:Meningoencephalitis        HPI:  79 y.o.  female with a past medical history significant for GERD, hypertension, hyperlipidemia and non-insulin-dependent type 2 diabetes presents to the ER with family due to weakness x1 day and not feeling well.  Patient denies any nausea any vomiting any diarrhea.  Patient denies any shortness breath or cough.  States that she started feeling bad after eating Chinese food with her family last night.  No one else in the family is feeling sick.  While she was in the ER was noted to have a temperature of a 103°.  Labs noted for white count of 11.8.  Lactic was normal.  Troponins were negative.  Urine was negative for any UTI.  COVID was negative chemistry was overall unremarkable.  Cultures were sent and she was started on vanc and Zosyn.  We were consulted for further evaluation and management of fever of unknown origin.  CT head was done and also negative for any mass or bleed.    On exam patient was noted to be tender on her right flank.  CT of the abdomen and pelvis with IV contrast was ordered for possible stone versus pyelo versus abscess.  CT of the abdomen and pelvis noted: Impression: Bilateral hydroureteronephrosis and moderate urinary bladder distension.  No nephrolithiasis.  Findings may be related to obstructive uropathy. Hepatic steatosis. Cholelithiasis and or a small gallbladder sludge.Small hiatal hernia. Consult urology was placed.  I contacted Urology to let them know the consulted to discuss possibilities of an infected stone.  Urology reviewed the report with me and no stone was mentioned.  Recommended Muhammad placement and continued  IV antibiotics.  We will follow up with her.  Culture sent from the Robison placed and pending.      Overview/Hospital Course:  Sepsis with b/l hydronephrosis, unclear origin of infection as urine is clean. No skin cellulitis or wounds. No oral/dental infx. No PNA. IV abx and robison placed. 103F despite V+Z, Stiff neck, shaking, Alterned mental status, Alert but not following any commands, not tracking staring blankl. ?neuorgenic bladder w Hydroneph- no bacterio or WBC in urine. Starting meningitis antibiotics. Meningitis meds started. Move to ICU. IR consult for LP. EEG ordered. ID and Neuro consult.     Update 1648:  S/p LP, patient has 560 WBC cells, which are 75% lymphocytes therefore most likely viral or fungal; however, a very high protein (223) is seen in fungal meningitis not viral. Amphotericin B added to Vanc, Ceft, Amp, Acy and Dex. Keeping broad coverage from now, awaiting cultures and PCRs. Glucose in CSF is 100 when the blood glucose was 178, this is about 60%, which could be viral or fungal. Could be Neurosyphilis..? Panel pending. EEG pending.    Interval History:  Still with high fevers despite tylenol  Patient not following any commands  Shaking, neurologic behavior and mvmts   Stiff neck, no other source of infection- al concerning for meningitis  All questions answered and updates on care given.       ROS:  Mental status change     Vitals:    06/08/24 0534 06/08/24 0544 06/08/24 0551 06/08/24 0729   BP: (!) 184/87      BP Location: Right arm      Patient Position: Lying      Pulse: 102   97   Resp: 20      Temp:  (!) 101.1 °F (38.4 °C) 98.7 °F (37.1 °C)    TempSrc:   Oral    SpO2: 97%      Weight:       Height:              Body mass index is 28.98 kg/m².      PHYSICAL EXAM:  GENERAL APPEARANCE: alert and cooperative, and appears to be in no acute distress.  HEENT:     HEAD: NC/AT     EYES: PERRL, EOMI.  Vision is grossly intact.  NECK: Neck supple, non-tender without LAD, masses or  thyromegaly.  CARDIAC: There is no peripheral edema, cyanosis or pallor.   LUNGS: Clear to auscultation and percussion without rales or wheezing  ABDOMEN: Non-distended. No guarding.  MSK: No joint erythema or tenderness.   EXTREMITIES: No significant deformity or joint abnormality. No edema.   NEUROLOGICAL: CN II-XII grossly intact.   SKIN: Skin normal color, texture and turgor with no lesions or eruptions.  PSYCHIATRIC: Oriented. No tangential speech. No Hyperactive features.        Recent Results (from the past 24 hour(s))   POCT glucose    Collection Time: 06/07/24  9:55 AM   Result Value Ref Range    POCT Glucose 209 (H) 70 - 110 mg/dL   POCT Influenza A/B Molecular    Collection Time: 06/07/24 11:03 AM   Result Value Ref Range    POC Molecular Influenza A Ag Negative Negative    POC Molecular Influenza B Ag Negative Negative     Acceptable Yes    POCT COVID-19 Rapid Screening    Collection Time: 06/07/24 11:03 AM   Result Value Ref Range    POC Rapid COVID Negative Negative     Acceptable Yes    Blood culture x two cultures. Draw prior to antibiotics.    Collection Time: 06/07/24 11:24 AM    Specimen: Peripheral, Antecubital, Left; Blood   Result Value Ref Range    Blood Culture, Routine No Growth to date    CBC auto differential    Collection Time: 06/07/24 11:25 AM   Result Value Ref Range    WBC 11.18 3.90 - 12.70 K/uL    RBC 4.89 4.00 - 5.40 M/uL    Hemoglobin 13.6 12.0 - 16.0 g/dL    Hematocrit 41.7 37.0 - 48.5 %    MCV 85 82 - 98 fL    MCH 27.8 27.0 - 31.0 pg    MCHC 32.6 32.0 - 36.0 g/dL    RDW 13.2 11.5 - 14.5 %    Platelets 223 150 - 450 K/uL    MPV 11.0 9.2 - 12.9 fL    Immature Granulocytes 0.3 0.0 - 0.5 %    Gran # (ANC) 8.6 (H) 1.8 - 7.7 K/uL    Immature Grans (Abs) 0.03 0.00 - 0.04 K/uL    Lymph # 1.9 1.0 - 4.8 K/uL    Mono # 0.7 0.3 - 1.0 K/uL    Eos # 0.0 0.0 - 0.5 K/uL    Baso # 0.05 0.00 - 0.20 K/uL    nRBC 0 0 /100 WBC    Gran % 76.8 (H) 38.0 - 73.0 %    Lymph %  16.5 (L) 18.0 - 48.0 %    Mono % 5.9 4.0 - 15.0 %    Eosinophil % 0.1 0.0 - 8.0 %    Basophil % 0.4 0.0 - 1.9 %    Differential Method Automated    Comprehensive metabolic panel    Collection Time: 06/07/24 11:25 AM   Result Value Ref Range    Sodium 138 136 - 145 mmol/L    Potassium 4.1 3.5 - 5.1 mmol/L    Chloride 101 95 - 110 mmol/L    CO2 25 23 - 29 mmol/L    Glucose 194 (H) 70 - 110 mg/dL    BUN 15 8 - 23 mg/dL    Creatinine 1.2 0.5 - 1.4 mg/dL    Calcium 10.1 8.7 - 10.5 mg/dL    Total Protein 8.2 6.0 - 8.4 g/dL    Albumin 3.7 3.5 - 5.2 g/dL    Total Bilirubin 0.7 0.1 - 1.0 mg/dL    Alkaline Phosphatase 70 55 - 135 U/L    AST 17 10 - 40 U/L    ALT 13 10 - 44 U/L    eGFR 47 (A) >60 mL/min/1.73 m^2    Anion Gap 12 8 - 16 mmol/L   Magnesium    Collection Time: 06/07/24 11:25 AM   Result Value Ref Range    Magnesium 1.2 (L) 1.6 - 2.6 mg/dL   Troponin I    Collection Time: 06/07/24 11:25 AM   Result Value Ref Range    Troponin I 0.010 0.000 - 0.026 ng/mL   TSH    Collection Time: 06/07/24 11:25 AM   Result Value Ref Range    TSH 0.223 (L) 0.400 - 4.000 uIU/mL   T4, Free    Collection Time: 06/07/24 11:25 AM   Result Value Ref Range    Free T4 1.05 0.71 - 1.51 ng/dL   Blood culture x two cultures. Draw prior to antibiotics.    Collection Time: 06/07/24 11:38 AM    Specimen: Peripheral, Upper Arm, Right; Blood   Result Value Ref Range    Blood Culture, Routine No Growth to date    Procalcitonin    Collection Time: 06/07/24 11:39 AM   Result Value Ref Range    Procalcitonin 0.02 <0.25 ng/mL   Urinalysis, Reflex to Urine Culture Urine, Clean Catch    Collection Time: 06/07/24 12:05 PM    Specimen: Urine   Result Value Ref Range    Specimen UA Urine, Catheterized     Color, UA Yellow Yellow, Straw, Dianelys    Appearance, UA Clear Clear    pH, UA 7.0 5.0 - 8.0    Specific Gravity, UA 1.020 1.005 - 1.030    Protein, UA 1+ (A) Negative    Glucose, UA 1+ (A) Negative    Ketones, UA Negative Negative    Bilirubin (UA) Negative  Negative    Occult Blood UA Negative Negative    Nitrite, UA Negative Negative    Urobilinogen, UA Negative <2.0 EU/dL    Leukocytes, UA Negative Negative   Urinalysis Microscopic    Collection Time: 06/07/24 12:05 PM   Result Value Ref Range    RBC, UA 5 (H) 0 - 4 /hpf    WBC, UA 0 0 - 5 /hpf    Bacteria None None-Occ /hpf    Hyaline Casts, UA 0 0-1/lpf /lpf    Microscopic Comment SEE COMMENT    Drug screen panel, emergency    Collection Time: 06/07/24 12:05 PM   Result Value Ref Range    Benzodiazepines Negative Negative    Methadone metabolites Negative Negative    Cocaine (Metab.) Negative Negative    Opiate Scrn, Ur Negative Negative    Barbiturate Screen, Ur Negative Negative    Amphetamine Screen, Ur Negative Negative    THC Negative Negative    Phencyclidine Negative Negative    Creatinine, Urine 165.8 15.0 - 325.0 mg/dL    Toxicology Information SEE COMMENT    POCT glucose    Collection Time: 06/07/24  2:46 PM   Result Value Ref Range    POCT Glucose 136 (H) 70 - 110 mg/dL   Ethanol    Collection Time: 06/07/24  5:08 PM   Result Value Ref Range    Alcohol, Serum <10 <10 mg/dL   Lactic acid, plasma #1    Collection Time: 06/07/24  6:05 PM   Result Value Ref Range    Lactate (Lactic Acid) 1.0 0.5 - 2.2 mmol/L   Procalcitonin    Collection Time: 06/07/24  7:34 PM   Result Value Ref Range    Procalcitonin 0.10 <0.25 ng/mL   POCT glucose    Collection Time: 06/07/24  9:41 PM   Result Value Ref Range    POCT Glucose 110 70 - 110 mg/dL   Lactic acid, plasma #2    Collection Time: 06/07/24 10:09 PM   Result Value Ref Range    Lactate (Lactic Acid) 0.8 0.5 - 2.2 mmol/L   POCT glucose    Collection Time: 06/07/24 10:09 PM   Result Value Ref Range    POCT Glucose 105 70 - 110 mg/dL   Respiratory Infection Panel (PCR), Nasopharyngeal    Collection Time: 06/07/24 10:22 PM    Specimen: Nasopharyngeal Swab   Result Value Ref Range    Respiratory Infection Panel Source NP Swab    POCT glucose    Collection Time: 06/08/24   5:31 AM   Result Value Ref Range    POCT Glucose 158 (H) 70 - 110 mg/dL   CBC Auto Differential    Collection Time: 06/08/24  5:32 AM   Result Value Ref Range    WBC 10.02 3.90 - 12.70 K/uL    RBC 4.85 4.00 - 5.40 M/uL    Hemoglobin 13.5 12.0 - 16.0 g/dL    Hematocrit 43.4 37.0 - 48.5 %    MCV 90 82 - 98 fL    MCH 27.8 27.0 - 31.0 pg    MCHC 31.1 (L) 32.0 - 36.0 g/dL    RDW 13.4 11.5 - 14.5 %    Platelets 206 150 - 450 K/uL    MPV 10.4 9.2 - 12.9 fL    Immature Granulocytes 0.4 0.0 - 0.5 %    Gran # (ANC) 6.7 1.8 - 7.7 K/uL    Immature Grans (Abs) 0.04 0.00 - 0.04 K/uL    Lymph # 2.2 1.0 - 4.8 K/uL    Mono # 1.0 0.3 - 1.0 K/uL    Eos # 0.0 0.0 - 0.5 K/uL    Baso # 0.03 0.00 - 0.20 K/uL    nRBC 0 0 /100 WBC    Gran % 66.8 38.0 - 73.0 %    Lymph % 21.9 18.0 - 48.0 %    Mono % 10.4 4.0 - 15.0 %    Eosinophil % 0.2 0.0 - 8.0 %    Basophil % 0.3 0.0 - 1.9 %    Differential Method Automated    Lactic acid, plasma    Collection Time: 06/08/24  5:32 AM   Result Value Ref Range    Lactate (Lactic Acid) 1.9 0.5 - 2.2 mmol/L   Procalcitonin    Collection Time: 06/08/24  5:32 AM   Result Value Ref Range    Procalcitonin 0.04 <0.25 ng/mL   Comprehensive metabolic panel    Collection Time: 06/08/24  5:32 AM   Result Value Ref Range    Sodium 141 136 - 145 mmol/L    Potassium 3.9 3.5 - 5.1 mmol/L    Chloride 107 95 - 110 mmol/L    CO2 22 (L) 23 - 29 mmol/L    Glucose 149 (H) 70 - 110 mg/dL    BUN 11 8 - 23 mg/dL    Creatinine 0.9 0.5 - 1.4 mg/dL    Calcium 8.9 8.7 - 10.5 mg/dL    Total Protein 7.5 6.0 - 8.4 g/dL    Albumin 3.2 (L) 3.5 - 5.2 g/dL    Total Bilirubin 1.0 0.1 - 1.0 mg/dL    Alkaline Phosphatase 58 55 - 135 U/L    AST 15 10 - 40 U/L    ALT 12 10 - 44 U/L    eGFR >60 >60 mL/min/1.73 m^2    Anion Gap 12 8 - 16 mmol/L   Procalcitonin    Collection Time: 06/08/24  5:32 AM   Result Value Ref Range    Procalcitonin 0.04 <0.25 ng/mL   Urinalysis, Reflex to Urine Culture Urine, Clean Catch    Collection Time: 06/08/24  5:36 AM     Specimen: Urine   Result Value Ref Range    Specimen UA Urine, Clean Catch     Color, UA Colorless (A) Yellow, Straw, Dianelys    Appearance, UA Clear Clear    pH, UA 7.0 5.0 - 8.0    Specific Gravity, UA 1.015 1.005 - 1.030    Protein, UA Negative Negative    Glucose, UA Trace (A) Negative    Ketones, UA Negative Negative    Bilirubin (UA) Negative Negative    Occult Blood UA Negative Negative    Nitrite, UA Negative Negative    Urobilinogen, UA Negative <2.0 EU/dL    Leukocytes, UA Negative Negative       Microbiology Results (last 7 days)       Procedure Component Value Units Date/Time    Blood culture [[id number]] Collected: 06/08/24 0542    Order Status: Sent Specimen: Blood from Peripheral, Hand, Right Updated: 06/08/24 0556    Respiratory Infection Panel (PCR), Nasopharyngeal [[id number]] Collected: 06/07/24 2222    Order Status: Completed Specimen: Nasopharyngeal Swab Updated: 06/07/24 2246     Respiratory Infection Panel Source NP Swab    Narrative:      Assay not valid for lower respiratory specimens, alternate  testing required.    Blood culture x two cultures. Draw prior to antibiotics. [[id number]] Collected: 06/07/24 1138    Order Status: Completed Specimen: Blood from Peripheral, Upper Arm, Right Updated: 06/07/24 1912     Blood Culture, Routine No Growth to date    Narrative:      Aerobic and anaerobic    Blood culture x two cultures. Draw prior to antibiotics. [[id number]] Collected: 06/07/24 1124    Order Status: Completed Specimen: Blood from Peripheral, Antecubital, Left Updated: 06/07/24 1912     Blood Culture, Routine No Growth to date    Narrative:      Aerobic and anaerobic    Blood culture x two cultures. Draw prior to antibiotics. [[id number]]     Order Status: Canceled Specimen: Blood     Blood culture x two cultures. Draw prior to antibiotics. [[id number]]     Order Status: Canceled Specimen: Blood              Imaging Results              CT Abdomen Pelvis With IV Contrast NO Oral Contrast  (Final result)  Result time 06/07/24 22:20:10      Final result by Aury Rosales MD (06/07/24 22:20:10)                   Impression:      Bilateral hydroureteronephrosis and moderate urinary bladder distension.  No nephrolithiasis.  Findings may be related to obstructive uropathy.    Hepatic steatosis.    Cholelithiasis and or a small gallbladder sludge.    Small hiatal hernia.      Electronically signed by: Aury Rosales  Date:    06/07/2024  Time:    22:20               Narrative:    EXAMINATION:  CT OF ABDOMEN PELVIS WITH    CLINICAL HISTORY:  Abdominal abscess/infection suspected;Flank pain, kidney stone suspected;Right flank pain and high fevers;    TECHNIQUE:  5 mm enhanced axial images were obtained from the lung bases through the greater trochanters.  Seventy-five mL of Omnipaque 350 was injected.    COMPARISON:  None.    FINDINGS:  There is fatty infiltration of the liver.    Moderate right hydroureteronephrosis and mild left hydroureteronephrosis is seen.  There is moderate distension of the urinary bladder without wall thickening.  No nephrolithiasis is present.  A too small to characterize low attenuation lesion is seen in the right renal cortex, which may represent a subcentimeter cyst.    Spleen, pancreas, and adrenal glands are unremarkable. The gallbladder contains layering gallstones and/or gallbladder sludge..    There is no definite evidence for abdominal adenopathy or ascites.    There are no pelvic masses or adenopathy.    There is no free fluid in the pelvis.    At the lung bases, there is a small hiatal hernia containing tiny fluid.  There is mild bibasilar atelectasis or scarring.    There is diffuse idiopathic skeletal hyperostoses.  Superior endplate irregularity is seen at L5.  Vacuum phenomena is seen at L2/L3 and L3/L4.                                       CT Head Without Contrast (Final result)  Result time 06/07/24 15:27:34      Final result by Dre Obrien DO (06/07/24  15:27:34)                   Impression:      Unremarkable noncontrast CT head as detailed above specifically without evidence for acute intracranial hemorrhage.  Clinical correlation and further evaluation as warranted.      Electronically signed by: Dre Obrien DO  Date:    06/07/2024  Time:    15:27               Narrative:    EXAMINATION:  CT HEAD WITHOUT CONTRAST    CLINICAL HISTORY:  Headache, new or worsening (Age >= 50y);  Headache, unspecified    TECHNIQUE:  Multiple sequential 5 mm axial images of the head without contrast.  Coronal and sagittal reformatted imaging from the axial acquisition.    COMPARISON:  None    FINDINGS:  There is no evidence for acute intracranial hemorrhage or sulcal effacement.  The ventricles are normal in size without hydrocephalus.  There is mild ill-defined decreased duration supratentorial white matter which is nonspecific and may be sequela of mild underlying chronic ischemic change.  Incidental basal gangliar calcifications identified    There is no midline shift or mass effect.  Visualized paranasal sinuses and mastoid air cells are clear.                                       X-Ray Chest AP Portable (Final result)  Result time 06/07/24 12:01:08      Final result by Brian Carrington MD (06/07/24 12:01:08)                   Impression:      1. Chronic appearing interstitial findings, no large focal consolidation.      Electronically signed by: Brian Carrington MD  Date:    06/07/2024  Time:    12:01               Narrative:    EXAMINATION:  XR CHEST AP PORTABLE    CLINICAL HISTORY:  Sepsis;    TECHNIQUE:  Single frontal view of the chest was performed.    COMPARISON:  09/14/2022    FINDINGS:  The cardiomediastinal silhouette is not enlarged noting calcification of the aorta..  There is no pleural effusion.  The trachea is midline.  The lungs are symmetrically expanded bilaterally with coarse interstitial attenuation.  There is a calcific focus projected over the right  upper lung zone, stable.  There is bilateral basilar subsegmental atelectasis or scarring, left greater than right..  No large focal consolidation seen.  There is no pneumothorax.  The osseous structures are remarkable for degenerative change..                                             Assessment/Plan:      * Meningoencephalitis  Sepsis with b/l hydronephrosis, unclear origin of infection as urine is clean. No skin cellulitis or wounds. No oral/dental infx. No PNA. IV abx and robison placed. 103F despite V+Z, Stiff neck, shaking, Alterned mental status, Alert but not following any commands, not tracking staring blankl. ?neuorgenic bladder w Hydroneph- no bacterio or WBC in urine. Starting meningitis antibiotics. Meningitis meds started. Move to ICU. IR consult for LP. EEG ordered. ID and Neuro consult.   S/p LP, patient has 560 WBC cells, which are 75% lymphocytes therefore most likely viral or fungal; however, a very high protein (223) is seen in fungal meningitis not viral. Amphotericin B added to Vanc, Ceft, Amp, Acy and Dex. Keeping broad coverage from now, awaiting cultures and PCRs. Glucose in CSF is 100 when the blood glucose was 178, this is about 60%, which could be viral or fungal. Could be Neurosyphilis..? Panel pending. EEG pending.      Hydroureteronephrosis  Called and discussed case with Urology on-call.  We will follow up with the recommendations.  Appreciate their help.  Robison catheter placed and we will monitor urine output.      Fever, unknown origin  CC scans psych etiology to be a urinary source though her urine so far is negative.  We will repeat her UA and send a culture.  Continue on vanc and Zosyn.  No signs of sepsis at this time.       HLD (hyperlipidemia)  Resume statin.      DM (diabetes mellitus)  Patient's FSGs are controlled on current medication regimen.  Last A1c reviewed-   Lab Results   Component Value Date    HGBA1C 9.2 (H) 09/14/2022     Most recent fingerstick glucose reviewed-    Recent Labs   Lab 06/07/24  1446 06/07/24  2141 06/07/24  2209 06/08/24  0531   POCTGLUCOSE 136* 110 105 158*     Current correctional scale  Low  Maintain anti-hyperglycemic dose as follows-   Antihyperglycemics (From admission, onward)      Start     Stop Route Frequency Ordered    06/07/24 1955  insulin aspart U-100 pen 0-5 Units         -- SubQ Before meals & nightly PRN 06/07/24 1855          Hold Oral hypoglycemics while patient is in the hospital.  We will follow up on A1c.  Diet is adjusted.    HTN (hypertension)  Chronic, controlled. Latest blood pressure and vitals reviewed-     Temp:  [97.7 °F (36.5 °C)-103.4 °F (39.7 °C)]   Pulse:  []   Resp:  [17-24]   BP: (110-185)/()   SpO2:  [94 %-100 %] .   Home meds for hypertension were reviewed and noted below.   Hypertension Medications               lisinopriL (PRINIVIL,ZESTRIL) 20 MG tablet Take 20 mg by mouth 2 (two) times a day.    metoprolol succinate (TOPROL-XL) 200 MG 24 hr tablet Take 200 mg by mouth once daily.            While in the hospital, will manage blood pressure as follows; Continue home antihypertensive regimen    Will utilize p.r.n. blood pressure medication only if patient's blood pressure greater than 180/110 and she develops symptoms such as worsening chest pain or shortness of breath.      VTE Risk Mitigation (From admission, onward)           Ordered     enoxaparin injection 40 mg  Daily         06/07/24 1857     IP VTE HIGH RISK PATIENT  Once         06/07/24 1857     Place sequential compression device  Until discontinued         06/07/24 1857                    Discharge Planning   PHYLICIA:      Code Status: Full Code   Is the patient medically ready for discharge?:     Reason for patient still in hospital (select all that apply): Patient trending condition and Treatment               Critical Care Time: 105 min    Critical care was time spent personally by me on the following activities: evaluating this patient's organ  dysfunction, development of treatment plan, discussing treatment plan with patient or surrogate and bedside caregivers, discussions with consultants, evaluation of patient's response to treatment, examination of patient, ordering and performing treatments and interventions, ordering and review of laboratory studies, ordering and review of radiographic studies, re-evaluation of patient's condition. This critical care time did not overlap with that of any other provider or involve time for any separately billed procedures    Diagnosis requiring critical care: meningitis         Madhav Delgadillo MD  Department of Hospital Medicine   [facility] - [place] [facility] [place]

## 2024-06-08 NOTE — PLAN OF CARE
Case Management Assessment     PCP: Arlen Rivera MD    Pharmacy: [facility]      Patient Arrived From: home  Existing Help at Home: spouse stated he can help some    Barriers to Discharge: none    Discharge Plan:    A. Home with spouse   B. HH      Patient in isolation at this time and unavailable to assist with assessment.  CM called spouse who assisted with assessment.  He will assist her to get home at time of DC.          06/08/24 4411   Discharge Assessment   Assessment Type Discharge Planning Assessment   Confirmed/corrected address, phone number and insurance Yes   Confirmed Demographics Correct on Facesheet   Source of Information family   If unable to respond/provide information was family/caregiver contacted? Yes   Communicated PHYLICIA with patient/caregiver Date not available/Unable to determine   People in Home spouse   Do you expect to return to your current living situation? Yes   Do you have help at home or someone to help you manage your care at home? Yes   Prior to hospitilization cognitive status: Alert/Oriented   Current cognitive status: Alert/Oriented   Walking or Climbing Stairs Difficulty no   Dressing/Bathing Difficulty no   Equipment Currently Used at Home none   Readmission within 30 days? No   Patient currently being followed by outpatient case management? No   Do you currently have service(s) that help you manage your care at home? No   Do you take prescription medications? Yes   Do you have prescription coverage? Yes   Do you have any problems affording any of your prescribed medications? No   Is the patient taking medications as prescribed? yes   How do you get to doctors appointments? car, drives self   Are you on dialysis? No   Do you take coumadin? No   Discharge Plan A Home with family   Discharge Plan B   (tbd)   DME Needed Upon Discharge    (tbd)   Discharge Plan discussed with: Spouse/sig other   Transition of Care Barriers None

## 2024-06-08 NOTE — PROGRESS NOTES
"Pharmacokinetic Initial Assessment: IV Vancomycin    Assessment/Plan:    Initiate intravenous vancomycin with loading dose of 1500 mg once followed by a maintenance dose of vancomycin 1000mg IV every 24 hours  Desired empiric serum trough concentration is 10 to 20 mcg/mL  Draw vancomycin trough level 60 min prior to third dose on 6-9-24 at approximately 1600  Pharmacy will continue to follow and monitor vancomycin.      Please contact pharmacy at extension [phone] with any questions regarding this assessment.     Thank you for the consult,   Lisacliff Riveraon       Patient brief summary:  Sussy Fulton is a 79 y.o. female initiated on antimicrobial therapy with IV Vancomycin for treatment of suspected  fever unknown source    Drug Allergies:   Review of patient's allergies indicates:  No Known Allergies    Actual Body Weight:   72.6 kg    Renal Function:   Estimated Creatinine Clearance: 39.2 mL/min (based on SCr of 1.2 mg/dL).,     Dialysis Method (if applicable):  N/A    CBC (last 72 hours):  Recent Labs   Lab Result Units 06/07/24  1125   WBC K/uL 11.18   Hemoglobin g/dL 13.6   Hematocrit % 41.7   Platelets K/uL 223   Gran % % 76.8*   Lymph % % 16.5*   Mono % % 5.9   Eosinophil % % 0.1   Basophil % % 0.4   Differential Method  Automated       Metabolic Panel (last 72 hours):  Recent Labs   Lab Result Units 06/07/24  1125 06/07/24  1205   Sodium mmol/L 138  --    Potassium mmol/L 4.1  --    Chloride mmol/L 101  --    CO2 mmol/L 25  --    Glucose mg/dL 194*  --    Glucose, UA   --  1+*   BUN mg/dL 15  --    Creatinine mg/dL 1.2  --    Creatinine, Urine mg/dL  --  165.8   Albumin g/dL 3.7  --    Total Bilirubin mg/dL 0.7  --    Alkaline Phosphatase U/L 70  --    AST U/L 17  --    ALT U/L 13  --    Magnesium mg/dL 1.2*  --        Drug levels (last 3 results):  No results for input(s): "VANCOMYCINRA", "VANCORANDOM", "VANCOMYCINPE", "VANCOPEAK", "VANCOMYCINTR", "VANCOTROUGH" in the last 72 hours.    Microbiologic " Results:  Microbiology Results (last 7 days)       Procedure Component Value Units Date/Time    Blood culture x two cultures. Draw prior to antibiotics. [[id number]] Collected: 06/07/24 1138    Order Status: Completed Specimen: Blood from Peripheral, Upper Arm, Right Updated: 06/07/24 1912     Blood Culture, Routine No Growth to date    Narrative:      Aerobic and anaerobic    Blood culture x two cultures. Draw prior to antibiotics. [[id number]] Collected: 06/07/24 1124    Order Status: Completed Specimen: Blood from Peripheral, Antecubital, Left Updated: 06/07/24 1912     Blood Culture, Routine No Growth to date    Narrative:      Aerobic and anaerobic    Respiratory Infection Panel (PCR), Nasopharyngeal [[id number]]     Order Status: No result Specimen: Nasopharyngeal Swab     Blood culture x two cultures. Draw prior to antibiotics. [[id number]]     Order Status: Canceled Specimen: Blood     Blood culture x two cultures. Draw prior to antibiotics. [[id number]]     Order Status: Canceled Specimen: Blood

## 2024-06-08 NOTE — ASSESSMENT & PLAN NOTE
Patient's FSGs are controlled on current medication regimen.  Last A1c reviewed-   Lab Results   Component Value Date    HGBA1C 9.2 (H) 09/14/2022     Most recent fingerstick glucose reviewed-   Recent Labs   Lab 06/07/24  1446 06/07/24  2141 06/07/24  2209 06/08/24  0531   POCTGLUCOSE 136* 110 105 158*     Current correctional scale  Low  Maintain anti-hyperglycemic dose as follows-   Antihyperglycemics (From admission, onward)      Start     Stop Route Frequency Ordered    06/07/24 1955  insulin aspart U-100 pen 0-5 Units         -- SubQ Before meals & nightly PRN 06/07/24 1855          Hold Oral hypoglycemics while patient is in the hospital.  We will follow up on A1c.  Diet is adjusted.

## 2024-06-08 NOTE — CONSULTS
[facility] - Intensive Care  Urology  Consult Note    Patient Name: Sussy Fulton  MRN: [id number]  Admission Date: 6/7/2024  Hospital Length of Stay: 1   Code Status: Full Code   Attending Provider: Madhav Delgadillo MD   Consulting Provider: Tennille Mortensen MD  Primary Care Physician: Arlen Rivera MD  Principal Problem:Meningoencephalitis    Inpatient consult to Urology  Consult performed by: Tennille Mortensen MD  Consult ordered by: Margaret Espitia MD  Reason for consult: retention/ hydro  Assessment/Recommendations: Robison placed, given new concern for meningitis not known at time of initial consultation okay to remove robison. Outpatient follow up warranted to ensure hydro has resolved.       Patient Name: Sussy Fulton  MRN: [id number]  Primary Care Provider: Arlen Rivera MD      CC Reason for Request: Concern for obstructive uropathy from CT report     HPI     Patient admitted for fever of unknown origin. Urology consulted based on CT findings. Patient without urinary symptoms, voids 4 x per day. Robison was placed given hydronephrosis reported on imaging. No obstructing stone or suggestion of obstructing mass. No difficulty with robison placement per RN.      ROS: Not able to obtain patient is disoriented     Physical Exam: Virtual Evaluation     Labs/Imaging  Chart reviewed: UA/ CBC/ BMP, CT scan     A/P  Mild hydronephrosis possible urinary retention  No difficulty with robison placement to suggest obstruction. Patient may have not voided in a while     UA did not suggest infection but a culture is pending.   No elevated Cr to suggest acute kidney injury from obstructive uropathy.   She is currently being evaluated for meningitis.      Given the risk to the physician a virtual / e consultation will be performed. Given that her urologic issues or not urgent or impacting her current clinical picture a virtual consultation was attempted. Unfortunately there were audio issues with the two work  stations. I spoke with her sister Sussy who provided additional information as patient is altered. I reviewed the documented notes in her chart        Her catheter can be removed as UTI nor ANITA is part of her clinical picture. Unless there is a documented elevated PVR with lower abdominal discomfort, a robison is a less likely to make a significant impact on her care.      I will arrange for outpatient follow up to ensure her hydronephrosis is evaluated for resolution. It may be a chronic condition incidentally detected.            I spoke w/ Dr. Espitia directly re: this patient care     Total time spent preparing for the referral and/or communicating with the consulting physician: Time; 16 MIN - 1 HOUR: 20 minutes        Thank you for your consult. I will sign off. Please contact us if you have any additional questions.    Tennille Mortensen MD  Urology  [facility] - Intensive Care

## 2024-06-08 NOTE — PROGRESS NOTES
Patient reviewed, renal function improved, no new levels, continue current therapy; Next levels due: 6/9 @ 16:00

## 2024-06-08 NOTE — CONSULTS
[facility] - Observation  Infectious Disease  Consult Note    Patient Name: Sussy Fulton  MRN: [id number]  Admission Date: 6/7/2024  Hospital Length of Stay: 1 days  Attending Physician: Madhav Delgadillo MD  Primary Care Provider: Arlen Rivera MD     Isolation Status: No active isolations    Patient information was obtained from past medical records and ER records.      Inpatient consult to Infectious Diseases  Consult performed by: Paola Burk MD  Consult ordered by: Madhav Delgadillo MD        Assessment/Plan:     Other  * Fever  Chart reviewed.   80y/o F with weakness, malaise and fevers x 1 day. Source remains unclear. UA bland, no leukocytosis, CTH wnl, CT a/p with hydroureteronephrosis (no obstructing stones s/p robison)otherwise unremarkable. Noted to have neck rigidity and worsening encephalopathy concerning for meningitis. Currently on vanc, ceftriaxone, ampicillin and acyclovir. Other differential diagnoses include med induced (serotonin sx, NMS), UTI (but UA bland x 2), viral syndrome/ encephalitis.    Recommendations:  -Agree with empiric meningitis coverage pending LP: ampicillin, vanc, ceftriaxone, acyclovir  -Please send for csf cell count with diff, protein, glucose, gram stain, culture, HSV PCR, VZV PCR, west nile, meningitis/ encephalitis panel, VDRL, etc  -will order HIV, treponema ab , RIP as part of encephalopathy work up  -agree with EEG  -will f/u bcx    Further recs pending formal evaluation of patient.            Thank you for your consult. I will follow-up with patient. Please contact us if you have any additional questions.    Paola Burk MD  Infectious Disease  [facility] - Observation    Subjective:     Principal Problem: Fever, unknown origin    HPI: 80y/o F pt w hx of HTN, HLD, T2DM  presented to the ER with family due to weakness x1 day and not feeling well and was admitted 6/7 for presumed sepsis with unclear source.    Pt denied nausea, vomiting,  "diarrhea, shortness breath or cough.  States that she started feeling bad after eating Chinese food with her family last night.  Denies sick contacts.      In the ED  pt was febrile to 103°.  Labs remarkable for wbc 11.8, LA wnl, UA bland,  COVID neg.  Started on empiric vanc and Zosyn. CT head unremarkable. CT of the abd/pel revealed: "Bilateral hydroureteronephrosis and moderate urinary bladder distension.  No nephrolithiasis.  Findings may be related to obstructive uropathy." Urology consulted and recommended robison placement and continued IV antibiotics.  Culture sent from the Robison placed and pending.    Hospital course c/b fevers despite bs abx and acute encephalopathy prompting transfer to icu and broadening of abx to vacn, caefteriaxone, ampicillin and acyclovir for meningitis coverage.    ID consulted for: "103F despite V+Z, Stiff neck, shaking, Alterned mental status, Alert but not following any commands, not tracking staring blankl. ?neuorgenic bladder w Hydroneph- no bacterio or WBC in urine "    No new subjective & objective note has been filed under this hospital service since the last note was generated.                "

## 2024-06-08 NOTE — NURSING
0500: Patients's temperature 101.1 and shivering. About to give tylenol oral when Dr. Espitia went in her room to assessed the patient.  ordered to give tylenol rectal and insert robison catheter. Med given and robison catheter inserted. Urine sample sent.     0550: Rechecked temperature 98.7.

## 2024-06-08 NOTE — NURSING
[facility] [place] 4 [place]      6/7/2024       Skin assessed on: Admit      [x] No Pressure Injuries Present    []Prevention Measures Documented    [] Yes LDA  for Pressure Injury Previously documented     [] Yes New Pressure Injury Discovered   [] LDA for New Pressure Injury Added      Attending RN:  Linnea Washburn, ANIRUDH     Second RN:  Anastasia Elizondo RN

## 2024-06-08 NOTE — PROCEDURES
"  Pre Op Diagnosis: Meningitis  Post Op Diagnosis: Same    Procedure: lumbar puncture    Procedure performed by: Loco    Written Informed Consent Obtained: Yes  Specimen Removed: YES 9cc of mostly clear CSF  Estimated Blood Loss: Minimal    Findings:   Successful LP. Opening pressure [id number]    Patient tolerated procedure well.    Maurisio Adan MD ([place])  Interventional Radiology  [phone]      "

## 2024-06-08 NOTE — ASSESSMENT & PLAN NOTE
- Sepsis with b/l hydronephrosis, unclear origin of infection as urine is clean. No skin cellulitis or wounds. No oral/dental infx. No PNA. IV abx and robison placed. 103F despite V+Z, Stiff neck, shaking, Alterned mental status, Alert but not following any   commands, not tracking staring blank. Neurogenic bladder w Hydroneph- no bacteria or WBC in urine.     - Starting meningitis antibiotics. Meningitis meds started. Move to ICU. IR consult for LP. EEG ordered. ID and Neuro consult.   - LP performed on 6/8 --> Cell count/diff with 560 WBC, predominantly lymphocytes with elevated protein and glucose  - on broad spectrum meningitis antibiotics while cultures/labs return  - mental status is improving with current regimen  - appreciate ID recommendations  - EEG report pending though she is no longer having staring spells

## 2024-06-08 NOTE — NURSING
Patient is AAOx4. On room air. Tele maintained. No falls or injuries reported during shift, safety precautions maintained.     Chart check completed.

## 2024-06-08 NOTE — TELEMEDICINE CONSULT
"[facility]   General Neurology  Consult Note      Consult Information  Inpatient consult to Telemedicine-General Neurology  Consult performed by: Sloane Abernathy MD  Consult ordered by: Madhav Delgadillo MD          Consulting Provider:    Current Providers  No providers found    Patient Location:  [facility] ICU IP Unit    Spoke hospital nurse at bedside with patient assisting consultant.  Patient information was obtained from patient and relative(s).       Neurology Documentation       Blood pressure (!) 119/58, pulse 98, temperature 98.5 °F (36.9 °C), temperature source Oral, resp. rate 17, height 5' 5" (1.651 m), weight 79 kg (174 lb 2.6 oz), SpO2 97%, not currently breastfeeding.    Medical Decision Making  HPI:  80y/o F pt w hx of HTN, HLD, T2DM  presented to the ER with family due to weakness x1 day and not feeling well and was admitted 6/7 for presumed sepsis with unclear source.     Pt denied nausea, vomiting, diarrhea, shortness breath or cough.  States that she started feeling bad after eating Chinese food with her family last night.  Denies sick contacts.       In the ED  pt was febrile to 103°.  Labs remarkable for wbc 11.8, LA wnl, UA bland,  COVID neg.  Started on empiric vanc and Zosyn. CT head unremarkable. CT of the abd/pel revealed: "Bilateral hydroureteronephrosis and moderate urinary bladder distension.  No nephrolithiasis.  Findings may be related to obstructive uropathy." Urology consulted and recommended robison placement and continued IV antibiotics.  Culture sent from the Robison placed and pending.     Hospital course c/b fevers despite bs abx and acute encephalopathy prompting transfer to icu and broadening of abx to vacn, caefteriaxone, ampicillin and acyclovir for meningitis coverage.     ID consulted for: "103F despite V+Z, Stiff neck, shaking, Alterned mental status, Alert but not following any commands, not tracking staring blankl. ?neuorgenic bladder w Hydroneph- no bacterio or WBC in " "urine "     Patient had LP performed with WBC>500 and elevated protein with lymphocytosis. Patient will also be started for empiric fungal coverage.    Per sisters at bedside, patient would have staring spells yesterday. She is improved today. Patient is oriented x 3 currently and is drowsy. Patient is currently connected to EEG.      Images personally reviewed and interpreted:  Study: Head CT  Study Interpretation: unremarkable     Additional studies reviewed:   Study: Cardiac_Neuro: EEG and LP results  Study Interpretation:  LP- WBC-  560, protein 223. Culture and m/e panel pending. EEG currently on.     Laboratory studies reviewed:  BMP:   Lab Results   Component Value Date     06/08/2024    K 3.9 06/08/2024     06/08/2024    CO2 22 (L) 06/08/2024    BUN 11 06/08/2024    CREATININE 0.9 06/08/2024    CALCIUM 8.9 06/08/2024     CBC:   Lab Results   Component Value Date    WBC 10.02 06/08/2024    RBC 4.85 06/08/2024    HGB 13.5 06/08/2024    HCT 43.4 06/08/2024     06/08/2024    MCV 90 06/08/2024    MCH 27.8 06/08/2024    MCHC 31.1 (L) 06/08/2024     Lipid Panel:   Lab Results   Component Value Date    CHOL 161 09/14/2022    LDLCALC 70.0 09/14/2022    HDL 45 09/14/2022    TRIG 230 (H) 09/14/2022     Coagulation: No results found for: "PT", "INR", "APTT"  Hgb A1C:   Lab Results   Component Value Date    HGBA1C 9.2 (H) 09/14/2022     TSH:   Lab Results   Component Value Date    TSH 0.223 (L) 06/07/2024       Documentation personally reviewed:  Notes: Notes: ED notes, H&P, and Consult notes from: ID     Assessment and plan:  Patient with history as above with suspected meningitis.  CSF studies suggestive of infectious process.  Appreciate Infectious Disease recommendations with empiric coverage adding fungal coverage.  EEG currently on going.  We would recommend starting empiric Keppra 500 mg bid. MRI brain w/wo contrast.     Keppra 500 mg bid  EEG ongoing  MRI brain w/wo contrast  F/u ID recs, CSF " results    No further neurology recs. Please reconsult neurology if EEG and/or MRI is abnormal.     Post charge discharge plan:  Clinic follow up: General Neurology and Epilepsy    Visit Type: in-person only  Timeframe: 2 weeks        Additional Physical Exam, History, & ROS  Review of Systems   Constitutional:  Positive for chills, fever and weight loss.   Neurological:  Positive for weakness and headaches.     Physical Exam  Eyes:      General: No scleral icterus.        Right eye: No discharge.         Left eye: No discharge.      Extraocular Movements: Extraocular movements intact.   Neurological:      Mental Status: She is oriented to person, place, and time.      Cranial Nerves: No dysarthria.      Comments: Drowsy, oriented  Moving all 4 ext equally       Past Medical History:   Diagnosis Date    Diabetes mellitus     Hypertension      No past surgical history on file.  No family history on file.    Diagnoses  No problems updated.    Sloane Abernathy MD    Neurology consultation requested by spoke provider. Audiovisual encounter with the patient performed using a secure connection.  Results and impressions from the visit are documented on this note and were communicated to the consulting provider/team via direct communication. The note has been shared for addition to the patients electronic medical record.

## 2024-06-08 NOTE — PLAN OF CARE
Pt tolerated procedure without s/sx of complication. 9 cc cloudy CSF collected, specimen transported to lab. Report called to ANIRUDH Olmos in ICU. Pt transported back to ICU on bed per IR RN and IR ORA.

## 2024-06-08 NOTE — NURSING TRANSFER
Nursing Transfer Note      6/8/2024   12:32 PM    Nurse giving handoff: AZUL  Nurse receiving handoff:TYLOR    Reason patient is being transferred: EEG POSSIBILITY FOR SEIZURES    Transfer To: 268    Transfer via bed    Transfer with 2L O2, cardiac monitoring    Transported by ANIRUDH FERRO      Telemetry: Rhythm NST  Order for Tele Monitor? Yes    Additional Lines: Oxygen and Muhammad Catheter    4eyes on Skin: yes    Medicines sent: ACYCLOVIR, LR    Any special needs or follow-up needed: CHECK TEMP AND TREAT    Patient belongings transferred with patient:  FAMILY HAS CLOTHES; JEWELRY REMOVED BY RN AND GIVEN TO FAMILY    Chart send with patient: Yes    Notified: daughter    Patient reassessed at: 6/8/2024 1200 (date, time)  1  Upon arrival to floor: cardiac monitor applied and bed in lowest position

## 2024-06-08 NOTE — PROGRESS NOTES
consult acknowledged. No surgical intervention indicated. No suggestion of UTI on UA. Concern for retention on imaging with mild hydro bilaterally, agree with robison. Void trial when nearing discharge, ensure low PVRs prior to discharge- potentially chronic undiagnosed condition in pt.

## 2024-06-09 LAB
ALBUMIN SERPL BCP-MCNC: 2.6 G/DL (ref 3.5–5.2)
ALP SERPL-CCNC: 52 U/L (ref 55–135)
ALT SERPL W/O P-5'-P-CCNC: 11 U/L (ref 10–44)
ANION GAP SERPL CALC-SCNC: 9 MMOL/L (ref 8–16)
AST SERPL-CCNC: 20 U/L (ref 10–40)
BASOPHILS # BLD AUTO: 0.01 K/UL (ref 0–0.2)
BASOPHILS NFR BLD: 0.1 % (ref 0–1.9)
BILIRUB SERPL-MCNC: 0.4 MG/DL (ref 0.1–1)
BUN SERPL-MCNC: 18 MG/DL (ref 8–23)
C GATTII+NEOFOR DNA CSF QL NAA+NON-PROBE: NOT DETECTED
CALCIUM SERPL-MCNC: 8 MG/DL (ref 8.7–10.5)
CHLORIDE SERPL-SCNC: 106 MMOL/L (ref 95–110)
CMV DNA CSF QL NAA+NON-PROBE: NOT DETECTED
CO2 SERPL-SCNC: 23 MMOL/L (ref 23–29)
CREAT SERPL-MCNC: 1 MG/DL (ref 0.5–1.4)
CRYPTOC AG CSF QL LA: NEGATIVE
DIFFERENTIAL METHOD BLD: ABNORMAL
E COLI K1 DNA CSF QL NAA+NON-PROBE: NOT DETECTED
EOSINOPHIL # BLD AUTO: 0 K/UL (ref 0–0.5)
EOSINOPHIL NFR BLD: 0 % (ref 0–8)
ERYTHROCYTE [DISTWIDTH] IN BLOOD BY AUTOMATED COUNT: 13.1 % (ref 11.5–14.5)
EST. GFR  (NO RACE VARIABLE): 58 ML/MIN/1.73 M^2
EV RNA CSF QL NAA+NON-PROBE: NOT DETECTED
GLUCOSE SERPL-MCNC: 240 MG/DL (ref 70–110)
GP B STREP DNA CSF QL NAA+NON-PROBE: NOT DETECTED
HAEM INFLU DNA CSF QL NAA+NON-PROBE: NOT DETECTED
HAEM INFLU DNA CSF QL NAA+NON-PROBE: NOT DETECTED
HCT VFR BLD AUTO: 37.8 % (ref 37–48.5)
HGB BLD-MCNC: 12.4 G/DL (ref 12–16)
HHV6 DNA CSF QL NAA+NON-PROBE: NOT DETECTED
HIV 1+2 AB+HIV1 P24 AG SERPL QL IA: NORMAL
HSV1 DNA CSF QL NAA+NON-PROBE: NOT DETECTED
HSV2 DNA CSF QL NAA+NON-PROBE: NOT DETECTED
IMM GRANULOCYTES # BLD AUTO: 0.04 K/UL (ref 0–0.04)
IMM GRANULOCYTES NFR BLD AUTO: 0.4 % (ref 0–0.5)
LYMPHOCYTES # BLD AUTO: 1.1 K/UL (ref 1–4.8)
LYMPHOCYTES NFR BLD: 9.6 % (ref 18–48)
MAGNESIUM SERPL-MCNC: 1.7 MG/DL (ref 1.6–2.6)
MCH RBC QN AUTO: 28.1 PG (ref 27–31)
MCHC RBC AUTO-ENTMCNC: 32.8 G/DL (ref 32–36)
MCV RBC AUTO: 86 FL (ref 82–98)
MONOCYTES # BLD AUTO: 0.5 K/UL (ref 0.3–1)
MONOCYTES NFR BLD: 4.8 % (ref 4–15)
N MEN DNA CSF QL NAA+NON-PROBE: NOT DETECTED
NEUTROPHILS # BLD AUTO: 9.3 K/UL (ref 1.8–7.7)
NEUTROPHILS NFR BLD: 85.1 % (ref 38–73)
NRBC BLD-RTO: 0 /100 WBC
PARECHOVIRUS A RNA CSF QL NAA+NON-PROBE: NOT DETECTED
PHOSPHATE SERPL-MCNC: 2.7 MG/DL (ref 2.7–4.5)
PLATELET # BLD AUTO: 156 K/UL (ref 150–450)
PMV BLD AUTO: 10.4 FL (ref 9.2–12.9)
POCT GLUCOSE: 226 MG/DL (ref 70–110)
POCT GLUCOSE: 248 MG/DL (ref 70–110)
POCT GLUCOSE: 265 MG/DL (ref 70–110)
POTASSIUM SERPL-SCNC: 3.4 MMOL/L (ref 3.5–5.1)
PROT SERPL-MCNC: 6.7 G/DL (ref 6–8.4)
RBC # BLD AUTO: 4.41 M/UL (ref 4–5.4)
S PNEUM DNA CSF QL NAA+NON-PROBE: NOT DETECTED
SODIUM SERPL-SCNC: 138 MMOL/L (ref 136–145)
TREPONEMA PALLIDUM IGG+IGM AB [PRESENCE] IN SERUM OR PLASMA BY IMMUNOASSAY: NONREACTIVE
VANCOMYCIN TROUGH SERPL-MCNC: 8.4 UG/ML (ref 10–22)
VZV DNA CSF QL NAA+NON-PROBE: NOT DETECTED
WBC # BLD AUTO: 10.97 K/UL (ref 3.9–12.7)

## 2024-06-09 PROCEDURE — 80053 COMPREHEN METABOLIC PANEL: CPT | Performed by: STUDENT IN AN ORGANIZED HEALTH CARE EDUCATION/TRAINING PROGRAM

## 2024-06-09 PROCEDURE — 27000221 HC OXYGEN, UP TO 24 HOURS

## 2024-06-09 PROCEDURE — 99223 1ST HOSP IP/OBS HIGH 75: CPT | Mod: ,,, | Performed by: INTERNAL MEDICINE

## 2024-06-09 PROCEDURE — 63600175 PHARM REV CODE 636 W HCPCS: Performed by: STUDENT IN AN ORGANIZED HEALTH CARE EDUCATION/TRAINING PROGRAM

## 2024-06-09 PROCEDURE — 85025 COMPLETE CBC W/AUTO DIFF WBC: CPT | Performed by: STUDENT IN AN ORGANIZED HEALTH CARE EDUCATION/TRAINING PROGRAM

## 2024-06-09 PROCEDURE — 84100 ASSAY OF PHOSPHORUS: CPT | Performed by: STUDENT IN AN ORGANIZED HEALTH CARE EDUCATION/TRAINING PROGRAM

## 2024-06-09 PROCEDURE — 80202 ASSAY OF VANCOMYCIN: CPT | Performed by: STUDENT IN AN ORGANIZED HEALTH CARE EDUCATION/TRAINING PROGRAM

## 2024-06-09 PROCEDURE — 25000003 PHARM REV CODE 250: Performed by: STUDENT IN AN ORGANIZED HEALTH CARE EDUCATION/TRAINING PROGRAM

## 2024-06-09 PROCEDURE — 36415 COLL VENOUS BLD VENIPUNCTURE: CPT | Performed by: INTERNAL MEDICINE

## 2024-06-09 PROCEDURE — 36415 COLL VENOUS BLD VENIPUNCTURE: CPT | Performed by: STUDENT IN AN ORGANIZED HEALTH CARE EDUCATION/TRAINING PROGRAM

## 2024-06-09 PROCEDURE — 25000003 PHARM REV CODE 250

## 2024-06-09 PROCEDURE — 83735 ASSAY OF MAGNESIUM: CPT | Performed by: STUDENT IN AN ORGANIZED HEALTH CARE EDUCATION/TRAINING PROGRAM

## 2024-06-09 PROCEDURE — 25000003 PHARM REV CODE 250: Performed by: INTERNAL MEDICINE

## 2024-06-09 PROCEDURE — 11000001 HC ACUTE MED/SURG PRIVATE ROOM

## 2024-06-09 PROCEDURE — 86593 SYPHILIS TEST NON-TREP QUANT: CPT | Performed by: INTERNAL MEDICINE

## 2024-06-09 PROCEDURE — 27000207 HC ISOLATION

## 2024-06-09 PROCEDURE — 99900035 HC TECH TIME PER 15 MIN (STAT)

## 2024-06-09 PROCEDURE — 94761 N-INVAS EAR/PLS OXIMETRY MLT: CPT

## 2024-06-09 PROCEDURE — A4216 STERILE WATER/SALINE, 10 ML: HCPCS | Performed by: STUDENT IN AN ORGANIZED HEALTH CARE EDUCATION/TRAINING PROGRAM

## 2024-06-09 PROCEDURE — 87389 HIV-1 AG W/HIV-1&-2 AB AG IA: CPT | Performed by: INTERNAL MEDICINE

## 2024-06-09 RX ORDER — INSULIN ASPART 100 [IU]/ML
5 INJECTION, SOLUTION INTRAVENOUS; SUBCUTANEOUS
Status: DISCONTINUED | OUTPATIENT
Start: 2024-06-09 | End: 2024-06-17 | Stop reason: HOSPADM

## 2024-06-09 RX ORDER — INSULIN ASPART 100 [IU]/ML
1-10 INJECTION, SOLUTION INTRAVENOUS; SUBCUTANEOUS
Status: DISCONTINUED | OUTPATIENT
Start: 2024-06-09 | End: 2024-06-17 | Stop reason: HOSPADM

## 2024-06-09 RX ORDER — OXYCODONE AND ACETAMINOPHEN 10; 325 MG/1; MG/1
1 TABLET ORAL EVERY 4 HOURS PRN
Status: DISCONTINUED | OUTPATIENT
Start: 2024-06-09 | End: 2024-06-17 | Stop reason: HOSPADM

## 2024-06-09 RX ORDER — IBUPROFEN 200 MG
16 TABLET ORAL
Status: DISCONTINUED | OUTPATIENT
Start: 2024-06-09 | End: 2024-06-17 | Stop reason: HOSPADM

## 2024-06-09 RX ORDER — GLUCAGON 1 MG
1 KIT INJECTION
Status: DISCONTINUED | OUTPATIENT
Start: 2024-06-09 | End: 2024-06-17 | Stop reason: HOSPADM

## 2024-06-09 RX ORDER — IBUPROFEN 200 MG
24 TABLET ORAL
Status: DISCONTINUED | OUTPATIENT
Start: 2024-06-09 | End: 2024-06-17 | Stop reason: HOSPADM

## 2024-06-09 RX ORDER — METOPROLOL SUCCINATE 50 MG/1
100 TABLET, EXTENDED RELEASE ORAL ONCE
Status: COMPLETED | OUTPATIENT
Start: 2024-06-09 | End: 2024-06-09

## 2024-06-09 RX ORDER — DEXAMETHASONE SODIUM PHOSPHATE 4 MG/ML
10 INJECTION, SOLUTION INTRA-ARTICULAR; INTRALESIONAL; INTRAMUSCULAR; INTRAVENOUS; SOFT TISSUE EVERY 6 HOURS
Status: DISCONTINUED | OUTPATIENT
Start: 2024-06-10 | End: 2024-06-11

## 2024-06-09 RX ADMIN — SODIUM CHLORIDE: 9 INJECTION, SOLUTION INTRAVENOUS at 01:06

## 2024-06-09 RX ADMIN — INSULIN DETEMIR 4 UNITS: 100 INJECTION, SOLUTION SUBCUTANEOUS at 09:06

## 2024-06-09 RX ADMIN — LEVETIRACETAM 500 MG: 100 INJECTION, SOLUTION INTRAVENOUS at 09:06

## 2024-06-09 RX ADMIN — CEFTRIAXONE 2 G: 2 INJECTION, POWDER, FOR SOLUTION INTRAMUSCULAR; INTRAVENOUS at 09:06

## 2024-06-09 RX ADMIN — DEXAMETHASONE SODIUM PHOSPHATE 12 MG: 4 INJECTION INTRA-ARTICULAR; INTRALESIONAL; INTRAMUSCULAR; INTRAVENOUS; SOFT TISSUE at 05:06

## 2024-06-09 RX ADMIN — POLYETHYLENE GLYCOL 3350 17 G: 17 POWDER, FOR SOLUTION ORAL at 09:06

## 2024-06-09 RX ADMIN — Medication 10 ML: at 05:06

## 2024-06-09 RX ADMIN — SODIUM CHLORIDE: 9 INJECTION, SOLUTION INTRAVENOUS at 11:06

## 2024-06-09 RX ADMIN — MUPIROCIN: 20 OINTMENT TOPICAL at 09:06

## 2024-06-09 RX ADMIN — AMPICILLIN SODIUM 2 G: 2 INJECTION, POWDER, FOR SOLUTION INTRAMUSCULAR; INTRAVENOUS at 04:06

## 2024-06-09 RX ADMIN — INSULIN ASPART 2 UNITS: 100 INJECTION, SOLUTION INTRAVENOUS; SUBCUTANEOUS at 09:06

## 2024-06-09 RX ADMIN — DEXAMETHASONE SODIUM PHOSPHATE 10 MG: 4 INJECTION INTRA-ARTICULAR; INTRALESIONAL; INTRAMUSCULAR; INTRAVENOUS; SOFT TISSUE at 11:06

## 2024-06-09 RX ADMIN — DEXAMETHASONE SODIUM PHOSPHATE 12 MG: 4 INJECTION INTRA-ARTICULAR; INTRALESIONAL; INTRAMUSCULAR; INTRAVENOUS; SOFT TISSUE at 12:06

## 2024-06-09 RX ADMIN — ACYCLOVIR SODIUM 570 MG: 50 INJECTION, SOLUTION INTRAVENOUS at 01:06

## 2024-06-09 RX ADMIN — ACYCLOVIR SODIUM 570 MG: 50 INJECTION, SOLUTION INTRAVENOUS at 08:06

## 2024-06-09 RX ADMIN — METOPROLOL SUCCINATE 100 MG: 50 TABLET, EXTENDED RELEASE ORAL at 10:06

## 2024-06-09 RX ADMIN — ONDANSETRON 4 MG: 2 INJECTION INTRAMUSCULAR; INTRAVENOUS at 05:06

## 2024-06-09 RX ADMIN — Medication 10 ML: at 12:06

## 2024-06-09 RX ADMIN — INSULIN ASPART 4 UNITS: 100 INJECTION, SOLUTION INTRAVENOUS; SUBCUTANEOUS at 05:06

## 2024-06-09 RX ADMIN — SIMETHICONE 80 MG: 80 TABLET, CHEWABLE ORAL at 09:06

## 2024-06-09 RX ADMIN — AMPICILLIN SODIUM 2 G: 2 INJECTION, POWDER, FOR SOLUTION INTRAMUSCULAR; INTRAVENOUS at 10:06

## 2024-06-09 RX ADMIN — HYDROMORPHONE HYDROCHLORIDE 0.5 MG: 1 INJECTION, SOLUTION INTRAMUSCULAR; INTRAVENOUS; SUBCUTANEOUS at 05:06

## 2024-06-09 RX ADMIN — ENOXAPARIN SODIUM 40 MG: 40 INJECTION SUBCUTANEOUS at 05:06

## 2024-06-09 RX ADMIN — AMPICILLIN SODIUM 2 G: 2 INJECTION, POWDER, FOR SOLUTION INTRAMUSCULAR; INTRAVENOUS at 05:06

## 2024-06-09 RX ADMIN — INSULIN ASPART 5 UNITS: 100 INJECTION, SOLUTION INTRAVENOUS; SUBCUTANEOUS at 05:06

## 2024-06-09 RX ADMIN — OXYCODONE AND ACETAMINOPHEN 1 TABLET: 10; 325 TABLET ORAL at 10:06

## 2024-06-09 RX ADMIN — ACYCLOVIR SODIUM 570 MG: 50 INJECTION, SOLUTION INTRAVENOUS at 10:06

## 2024-06-09 RX ADMIN — INSULIN ASPART 6 UNITS: 100 INJECTION, SOLUTION INTRAVENOUS; SUBCUTANEOUS at 12:06

## 2024-06-09 RX ADMIN — ACETAMINOPHEN 650 MG: 325 TABLET ORAL at 09:06

## 2024-06-09 RX ADMIN — VANCOMYCIN HYDROCHLORIDE 1250 MG: 1.25 INJECTION, POWDER, LYOPHILIZED, FOR SOLUTION INTRAVENOUS at 06:06

## 2024-06-09 NOTE — ASSESSMENT & PLAN NOTE
78y/o F with weakness, malaise and fevers x 1 day. Source remains unclear. UA bland, no leukocytosis, CTH wnl, CT a/p with hydroureteronephrosis (no obstructing stones s/p robison)otherwise unremarkable. UA neg x 2. Noted to have neck rigidity and worsening encephalopathy concerning for meningitis. Broadened to vanc, ceftriaxone, ampicillin and acyclovir. LP revealed cloudy csf (wbc 500/ lymp / prtn 223/ gluc  100). Ampho started due to concern for fungal cns infection.    Recent sinusitis may be source of cns infection, but LP results more consistent with viral etiology. Also with new lower back pain- unclear if related to b/l hydronephrosis, but may be worth investigating further if persists.  Fever curve improving and mental status now has returned to baseline. No risk factors for fungal cns infection including animal exposures, travel, immunocompromised status. Encephalitis panel negative. Hiv neg. EEG w/o seizures. RIP neg.    Recommendations:  --ok to continue ampicillin, vanc, ceftriaxone for now pending csf cultures. Anticipate stopping if cx neg.  --stop ampho   --ok to continue acyclovir pending HSV and VZV pcr   --f/u HSV PCR, VZV PCR, west nile, VDRL, csf cultures  --if febrile repeat bcx, consider mri brain and l spine

## 2024-06-09 NOTE — NURSING
[facility], [facility]  Nurses Note -- 4 Eyes      6/9/2024       Skin assessed on: Q Shift      [x] No Pressure Injuries Present    [x]Prevention Measures Documented    [] Yes LDA  for Pressure Injury Previously documented     [] Yes New Pressure Injury Discovered   [] LDA for New Pressure Injury Added      Attending RN:  Bryanna Felder, RN     Second :  Lula SCHNEIDER LPN

## 2024-06-09 NOTE — PROVIDER TRANSFER
Ms. Fulton is a 78 yo F with reported PMHx of DM2, HTN, HLD and GERD who was admitted with fevers and encephalopathy. Concerns for sepsis. Thought to be due to meningitis - LP performed with 560 WBC, predominantly lymphocytes. On Vanc, Ceftriaxone, Ampicillin, Acyclovir and dexamethasone (for 4 days total). ID consulted and following. She was initially moved to ICU for closer monitoring. Mental status is improving daily, diet now started. She is still having some pain but no further fevers. Amphotericin stopped today. Insulin added for hyperglycemia. Stable for transfer out of ICU.    - titrate insulin for glucose control, will probably improve once dexamethasone has been stopped  - f/u lab work/cultures and ID recommendations, adjust medications accordingly  - likely PT/OT consults tomorrow vs next day    Erasmo Alvarez MD  Department of Hospital Medicine  [facility]

## 2024-06-09 NOTE — PROGRESS NOTES
Pharmacokinetic Assessment Follow Up: IV Vancomycin    Vancomycin serum concentration assessment(s):    The trough level was drawn correctly and can be used to guide therapy at this time. The measurement is below the desired definitive target range of 10 to 20 mcg/mL.    Vancomycin Regimen Plan:    Change regimen to Vancomycin 1250 mg IV every 24 hours with next serum trough concentration measured at 1630 prior to 3rd dose on 6/11    Drug levels (last 3 results):  Recent Labs   Lab Result Units 06/09/24  1601   Vancomycin-Trough ug/mL 8.4*       Pharmacy will continue to follow and monitor vancomycin.    Please contact pharmacy at extension [phone] for questions regarding this assessment.    Thank you for the consult,   Diana Funk       Patient brief summary:  Sussy Fulton is a 79 y.o. female initiated on antimicrobial therapy with IV Vancomycin for treatment of  fever of unknown source    The patient's current regimen is Vancomycin 1250mg IV q24hr    Drug Allergies:   Review of patient's allergies indicates:  No Known Allergies    Actual Body Weight:   79 kg    Renal Function:   Estimated Creatinine Clearance: 48.9 mL/min (based on SCr of 1 mg/dL).,     Dialysis Method (if applicable):  N/A    CBC (last 72 hours):  Recent Labs   Lab Result Units 06/07/24  1125 06/07/24  2209 06/08/24  0532 06/09/24  0437   WBC K/uL 11.18  --  10.02 10.97   Hemoglobin g/dL 13.6  --  13.5 12.4   Hemoglobin A1C %  --  8.1* 8.0*  --    Hematocrit % 41.7  --  43.4 37.8   Platelets K/uL 223  --  206 156   Gran % % 76.8*  --  66.8 85.1*   Lymph % % 16.5*  --  21.9 9.6*   Mono % % 5.9  --  10.4 4.8   Eosinophil % % 0.1  --  0.2 0.0   Basophil % % 0.4  --  0.3 0.1   Differential Method  Automated  --  Automated Automated       Metabolic Panel (last 72 hours):  Recent Labs   Lab Result Units 06/07/24  1125 06/07/24  1205 06/08/24  0532 06/08/24  0536 06/08/24  1247 06/09/24  0437   Sodium mmol/L 138  --  141  --   --  138   Potassium  mmol/L 4.1  --  3.9  --   --  3.4*   Chloride mmol/L 101  --  107  --   --  106   CO2 mmol/L 25  --  22*  --   --  23   Glucose mg/dL 194*  --  149*  --   --  240*   Glucose, CSF mg/dL  --   --   --   --  100*  --    Glucose, UA   --  1+*  --  Trace*  --   --    BUN mg/dL 15  --  11  --   --  18   Creatinine mg/dL 1.2  --  0.9  --   --  1.0   Creatinine, Urine mg/dL  --  165.8  --   --   --   --    Albumin g/dL 3.7  --  3.2*  --   --  2.6*   Total Bilirubin mg/dL 0.7  --  1.0  --   --  0.4   Alkaline Phosphatase U/L 70  --  58  --   --  52*   AST U/L 17  --  15  --   --  20   ALT U/L 13  --  12  --   --  11   Magnesium mg/dL 1.2*  --   --   --   --  1.7   Phosphorus mg/dL  --   --   --   --   --  2.7       Vancomycin Administrations:  vancomycin given in the last 96 hours                     vancomycin (VANCOCIN) 1,000 mg in dextrose 5 % (D5W) 250 mL IVPB (Vial-Mate) (mg) 1,000 mg New Bag 06/08/24 1952    vancomycin 1.5 g in dextrose 5 % 250 mL IVPB (ready to mix) (mg) 1,500 mg New Bag 06/07/24 1800                    Microbiologic Results:  Microbiology Results (last 7 days)       Procedure Component Value Units Date/Time    Cryptococcal antigen, CSF [[id number]] Collected: 06/08/24 1247    Order Status: Sent Specimen: CSF (Spinal Fluid) from CSF Tap, Tube 1 Updated: 06/09/24 1341    Blood culture x two cultures. Draw prior to antibiotics. [[id number]] Collected: 06/07/24 1124    Order Status: Completed Specimen: Blood from Peripheral, Antecubital, Left Updated: 06/09/24 1303     Blood Culture, Routine No Growth to date      No Growth to date      No Growth to date    Narrative:      Aerobic and anaerobic    Blood culture x two cultures. Draw prior to antibiotics. [[id number]] Collected: 06/07/24 1138    Order Status: Completed Specimen: Blood from Peripheral, Upper Arm, Right Updated: 06/09/24 1303     Blood Culture, Routine No Growth to date      No Growth to date      No Growth to date    Narrative:      Aerobic  and anaerobic    CSF culture [[id number]] Collected: 06/08/24 1247    Order Status: Completed Specimen: CSF (Spinal Fluid) from CSF Tap, Tube 1 Updated: 06/09/24 1015     CSF CULTURE No Growth to date     Gram Stain Result Cytospin indicates:      Many WBC's      No organisms seen    Blood culture [[id number]] Collected: 06/08/24 0542    Order Status: Completed Specimen: Blood from Peripheral, Hand, Right Updated: 06/09/24 0703     Blood Culture, Routine No Growth to date      No Growth to date    Respiratory Infection Panel (PCR), Nasopharyngeal [[id number]] Collected: 06/07/24 2222    Order Status: Completed Specimen: Nasopharyngeal Swab Updated: 06/08/24 2058     Respiratory Infection Panel Source NP Swab     Adenovirus Not Detected     Coronavirus 229E, Common Cold Virus Not Detected     Coronavirus HKU1, Common Cold Virus Not Detected     Coronavirus NL63, Common Cold Virus Not Detected     Coronavirus OC43, Common Cold Virus Not Detected     Comment: The Coronavirus strains detected in this test cause the common cold.  These strains are not the COVID-19 (novel Coronavirus)strain   associated with the respiratory disease outbreak.          SARS-CoV2 (COVID-19) Qualitative PCR Not Detected     Human Metapneumovirus Not Detected     Human Rhinovirus/Enterovirus Not Detected     Influenza A (subtypes H1, H1-2009,H3) Not Detected     Influenza B Not Detected     Parainfluenza Virus 1 Not Detected     Parainfluenza Virus 2 Not Detected     Parainfluenza Virus 3 Not Detected     Parainfluenza Virus 4 Not Detected     Respiratory Syncytial Virus Not Detected     Bordetella Parapertussis (IC1882) Not Detected     Bordetella pertussis (ptxP) Not Detected     Chlamydia pneumoniae Not Detected     Mycoplasma pneumoniae Not Detected    Narrative:      Assay not valid for lower respiratory specimens, alternate  testing required.    Fungus culture [[id number]] Collected: 06/08/24 1247    Order Status: Sent Specimen: CSF  (Spinal Fluid) from CSF Tap, Tube 1 Updated: 06/08/24 1312    Gram stain [[id number]] Collected: 06/08/24 1247    Order Status: Canceled Specimen: CSF (Spinal Fluid) from CSF Tap, Tube 1     Respiratory Infection Panel (PCR), Nasopharyngeal [[id number]]     Order Status: Canceled Specimen: Nasopharyngeal Swab     Culture, Anaerobe [[id number]]     Order Status: Canceled Specimen: CSF (Spinal Fluid)     Aerobic culture [[id number]]     Order Status: Canceled Specimen: CSF (Spinal Fluid)     Blood culture x two cultures. Draw prior to antibiotics. [[id number]]     Order Status: Canceled Specimen: Blood     Blood culture x two cultures. Draw prior to antibiotics. [[id number]]     Order Status: Canceled Specimen: Blood

## 2024-06-09 NOTE — CONSULTS
[facility] - Telemetry  Infectious Disease  Consult Note    Patient Name: Sussy Fulton  MRN: [id number]  Admission Date: 6/7/2024  Hospital Length of Stay: 2 days  Attending Physician: Erasmo Alvarez MD  Primary Care Provider: Arlen Rivera MD     Isolation Status: Droplet    Patient information was obtained from patient, past medical records, and ER records.      Consults  Assessment/Plan:     Other  Fever, unknown origin  80y/o F with weakness, malaise and fevers x 1 day. Source remains unclear. UA bland, no leukocytosis, CTH wnl, CT a/p with hydroureteronephrosis (no obstructing stones s/p robison)otherwise unremarkable. UA neg x 2. Noted to have neck rigidity and worsening encephalopathy concerning for meningitis. Broadened to vanc, ceftriaxone, ampicillin and acyclovir. LP revealed cloudy csf (wbc 500/ lymp / prtn 223/ gluc  100). Ampho started due to concern for fungal cns infection.    Recent sinusitis may be source of cns infection, but LP results more consistent with viral etiology. Also with new lower back pain- unclear if related to b/l hydronephrosis, but may be worth investigating further if persists.  Fever curve improving and mental status now has returned to baseline. No risk factors for fungal cns infection including animal exposures, travel, immunocompromised status. Encephalitis panel negative. Hiv neg. EEG w/o seizures. RIP neg.    Recommendations:  --ok to continue ampicillin, vanc, ceftriaxone for now pending csf cultures. Anticipate stopping if cx neg.  --stop ampho   --ok to continue acyclovir pending HSV and VZV pcr   --f/u HSV PCR, VZV PCR, [place], VDRL, csf cultures  --if febrile repeat bcx, consider mri brain and l spine          Thank you for your consult. I will follow-up with patient. Please contact us if you have any additional questions.    Paola Burk MD  Infectious Disease  [facility]    Subjective:     Principal Problem:  "Meningoencephalitis    HPI: 78y/o F pt w hx of HTN, HLD, T2DM  presented to the ER with family due to weakness x1 day and not feeling well and was admitted 6/7 for presumed sepsis with unclear source.    Pt denied nausea, vomiting, diarrhea, shortness breath or cough.  States that she started feeling bad after eating Chinese food with her family last night.  Denies sick contacts.      In the ED  pt was febrile to 103°.  Labs remarkable for wbc 11.8, LA wnl, UA bland,  COVID neg.  Started on empiric vanc and Zosyn. CT head unremarkable. CT of the abd/pel revealed: "Bilateral hydroureteronephrosis and moderate urinary bladder distension.  No nephrolithiasis.  Findings may be related to obstructive uropathy." Urology consulted and recommended robison placement and continued IV antibiotics.  Culture sent from the Robison placed and pending.    Hospital course c/b fevers despite bs abx and worsening acute encephalopathy prompting transfer to icu and broadening of abx to vacn, ceftriaxone, ampicillin and acyclovir for meningitis coverage.    ID consulted for: "103F despite V+Z, Stiff neck, shaking, Alterned mental status, Alert but not following any commands, not tracking staring blank. ?neuorgenic bladder w Hydroneph- no bacterio or WBC in urine "    Pt reports recent sinusitis 1 wk prior for which she received a steroid injection and albuterol. Notes new b/l lower back pain on day of presentation, but otherwise no new symptoms. Denies sick contacts, animal exposures, recent travel or recent surgeries/ dental procedures. Does not work. Lives with her  who is a dialysis pt and her son. Denies having eaten any unpasteurized dairy products. Ate chinese food tues, but nobody else got sick.     Past Medical History:   Diagnosis Date    Diabetes mellitus     Hypertension        No past surgical history on file.    Review of patient's allergies indicates:  No Known Allergies    Medications:  Medications Prior to Admission " "  Medication Sig    atorvastatin (LIPITOR) 10 MG tablet Take 10 mg by mouth once daily. Patient doesn't know the dose    cetirizine (ZYRTEC) 10 MG tablet Take 10 mg by mouth once daily.    diclofenac sodium (VOLTAREN) 1 % Gel Apply 2 g topically once daily.    HYDROcodone-acetaminophen (NORCO) 5-325 mg per tablet Take 1 tablet by mouth every 6 (six) hours as needed.    lisinopriL (PRINIVIL,ZESTRIL) 20 MG tablet Take 20 mg by mouth 2 (two) times a day.    metFORMIN (GLUCOPHAGE) 500 MG tablet Take 500 mg by mouth 2 (two) times daily with meals.    metoprolol succinate (TOPROL-XL) 200 MG 24 hr tablet Take 200 mg by mouth once daily.    omeprazole (PRILOSEC) 20 MG capsule Take 20 mg by mouth once daily.     Antibiotics (From admission, onward)      Start     Stop Route Frequency Ordered    06/08/24 2100  mupirocin 2 % ointment         06/13/24 2059 Nasl 2 times daily 06/08/24 1257    06/08/24 1700  vancomycin (VANCOCIN) 1,000 mg in dextrose 5 % (D5W) 250 mL IVPB (Vial-Mate)         -- IV Every 24 hours (non-standard times) 06/07/24 1922    06/08/24 1000  ampicillin (OMNIPEN) 2 g in sodium chloride 0.9 % 100 mL IVPB (MB+)  (Medication Panel)         -- IV Every 6 hours (non-standard times) 06/08/24 0900    06/08/24 1000  cefTRIAXone (ROCEPHIN) 2 g in dextrose 5 % in water (D5W) 100 mL IVPB (MB+)  (Medication Panel)         -- IV Every 12 hours (non-standard times) 06/08/24 0900    06/07/24 1952  vancomycin - pharmacy to dose  (vancomycin IVPB (PEDS and ADULTS))        Placed in "And" Linked Group    -- IV pharmacy to manage frequency 06/07/24 1853          Antifungals (From admission, onward)      None          Antivirals (From admission, onward)          Stop Route Frequency     acyclovir (ZOVIRAX) injection         -- IV Every 8 hours (non-standard times)               There is no immunization history on file for this patient.    Family History    None       Social History     Socioeconomic History    Marital status: "    Tobacco Use    Smoking status: Never    Smokeless tobacco: Never   Substance and Sexual Activity    Alcohol use: Not Currently     Social Determinants of Health     Financial Resource Strain: Low Risk  (6/9/2024)    Overall Financial Resource Strain (CARDIA)     Difficulty of Paying Living Expenses: Not very hard   Food Insecurity: No Food Insecurity (6/9/2024)    Hunger Vital Sign     Worried About Running Out of Food in the Last Year: Never true     Ran Out of Food in the Last Year: Never true   Transportation Needs: No Transportation Needs (6/9/2024)    TRANSPORTATION NEEDS     Transportation : No   Stress: No Stress Concern Present (6/9/2024)    [employer] [place] [employer] - Occupational Stress Questionnaire     Feeling of Stress : Only a little   Housing Stability: Low Risk  (6/9/2024)    Housing Stability Vital Sign     Unable to Pay for Housing in the Last Year: No     Homeless in the Last Year: No     Review of Systems   Constitutional:  Positive for chills, fatigue and fever.   HENT:  Positive for sinus pain. Negative for dental problem, ear discharge, ear pain, facial swelling and sore throat.    Respiratory:  Negative for cough, chest tightness and shortness of breath.    Gastrointestinal:  Negative for abdominal pain, diarrhea and nausea.   Genitourinary:  Negative for dysuria, flank pain and frequency.   Musculoskeletal:  Positive for back pain. Negative for arthralgias, gait problem, joint swelling and neck pain.   Skin:  Negative for color change and wound.   Psychiatric/Behavioral:  Confusion: now resolved.      Objective:     Vital Signs (Most Recent):  Temp: 98.6 °F (37 °C) (06/09/24 1300)  Pulse: (!) 118 (06/09/24 1527)  Resp: 16 (06/09/24 1400)  BP: (!) 120/57 (06/09/24 1400)  SpO2: 97 % (06/09/24 1400) Vital Signs (24h Range):  Temp:  [98 °F (36.7 °C)-100.2 °F (37.9 °C)] 98.6 °F (37 °C)  Pulse:  [] 118  Resp:  [15-42] 16  SpO2:  [93 %-100 %] 97 %  BP:  "()/(51-70) 120/57     Weight: 79 kg (174 lb 2.6 oz)  Body mass index is 28.98 kg/m².    Estimated Creatinine Clearance: 48.9 mL/min (based on SCr of 1 mg/dL).     Physical Exam  Vitals reviewed.   Constitutional:       Appearance: Normal appearance.   HENT:      Head: Normocephalic and atraumatic.      Nose:      Comments: ttp r maxillary sinus  Eyes:      Conjunctiva/sclera: Conjunctivae normal.      Pupils: Pupils are equal, round, and reactive to light.   Cardiovascular:      Rate and Rhythm: Normal rate and regular rhythm.   Pulmonary:      Effort: Pulmonary effort is normal.      Breath sounds: Normal breath sounds.   Abdominal:      General: There is no distension.   Musculoskeletal:         General: No swelling or deformity.      Cervical back: Normal range of motion.      Right lower leg: No edema.      Left lower leg: No edema.      Comments: No tender to palpation at spinal processes.   Skin:     Findings: No lesion or rash.   Neurological:      General: No focal deficit present.      Mental Status: She is alert and oriented to person, place, and time.   Psychiatric:         Mood and Affect: Mood normal.         Behavior: Behavior normal.          Significant Labs: CSF:   Recent Labs   Lab 06/08/24  1247   CSFCULTURE No Growth to date     Wound Culture: No results for input(s): "LABAERO" in the last 4320 hours.  All pertinent labs within the past 24 hours have been reviewed.    Significant Imaging: I have reviewed all pertinent imaging results/findings within the past 24 hours.              "

## 2024-06-09 NOTE — ASSESSMENT & PLAN NOTE
Chronic, controlled. Latest blood pressure and vitals reviewed-     Temp:  [98 °F (36.7 °C)-104 °F (40 °C)]   Pulse:  []   Resp:  [15-51]   BP: ()/(51-77)   SpO2:  [93 %-100 %] .   Home meds for hypertension were reviewed and noted below.   Hypertension Medications               lisinopriL (PRINIVIL,ZESTRIL) 20 MG tablet Take 20 mg by mouth 2 (two) times a day.    metoprolol succinate (TOPROL-XL) 200 MG 24 hr tablet Take 200 mg by mouth once daily.            While in the hospital, will manage blood pressure as follows; Continue home antihypertensive regimen    Will utilize p.r.n. blood pressure medication only if patient's blood pressure greater than 180/110 and she develops symptoms such as worsening chest pain or shortness of breath.

## 2024-06-09 NOTE — PROGRESS NOTES
[facility] Medicine  Progress Note    Patient Name: Sussy Fulton  MRN: [id number]  Patient Class: IP- Inpatient   Admission Date: 6/7/2024  Length of Stay: 2 days  Attending Physician: Erasmo Alvarez MD  Primary Care Provider: Arlen Rivera MD        Subjective:     Principal Problem:Meningoencephalitis        HPI:  79 y.o.  female with a past medical history significant for GERD, hypertension, hyperlipidemia and non-insulin-dependent type 2 diabetes presents to the ER with family due to weakness x1 day and not feeling well.  Patient denies any nausea any vomiting any diarrhea.  Patient denies any shortness breath or cough.  States that she started feeling bad after eating Chinese food with her family last night.  No one else in the family is feeling sick.  While she was in the ER was noted to have a temperature of a 103°.  Labs noted for white count of 11.8.  Lactic was normal.  Troponins were negative.  Urine was negative for any UTI.  COVID was negative chemistry was overall unremarkable.  Cultures were sent and she was started on vanc and Zosyn.  We were consulted for further evaluation and management of fever of unknown origin.  CT head was done and also negative for any mass or bleed.    On exam patient was noted to be tender on her right flank.  CT of the abdomen and pelvis with IV contrast was ordered for possible stone versus pyelo versus abscess.  CT of the abdomen and pelvis noted: Impression: Bilateral hydroureteronephrosis and moderate urinary bladder distension.  No nephrolithiasis.  Findings may be related to obstructive uropathy. Hepatic steatosis. Cholelithiasis and or a small gallbladder sludge.Small hiatal hernia. Consult urology was placed.  I contacted Urology to let them know the consulted to discuss possibilities of an infected stone.  Urology reviewed the report with me and no stone was mentioned.  Recommended Muhammad placement and continued  IV antibiotics.  We will follow up with her.  Culture sent from the Robison placed and pending.      Overview/Hospital Course:  Sepsis with b/l hydronephrosis, unclear origin of infection as urine is clean. No skin cellulitis or wounds. No oral/dental infx. No PNA. IV abx and robison placed. 103F despite V+Z, Stiff neck, shaking, Alterned mental status, Alert but not following any commands, not tracking staring blankl. ?neuorgenic bladder w Hydroneph- no bacterio or WBC in urine. Starting meningitis antibiotics. Meningitis meds started. Move to ICU. IR consult for LP. EEG ordered. ID and Neuro consult. S/p LP with 560 WBC and 75% lymphocytes - concerning for viral or fungal. Elevated Protein 223. On Amphotericin B, vanc, ceftriaxone, ampicillin, acylocivir and dexamethasone. Awaiting further culture data and ID recommendations. Mental status improving.    Interval History: mental status improving today, she is more alert and awake per family at bedside. She does report having some right shoulder pain and lower back pain.     Review of Systems  Objective:     Vital Signs (Most Recent):  Temp: 98.9 °F (37.2 °C) (06/09/24 1101)  Pulse: 109 (06/09/24 1101)  Resp: (!) 39 (06/09/24 1101)  BP: 121/61 (06/09/24 1101)  SpO2: 95 % (06/09/24 1101) Vital Signs (24h Range):  Temp:  [98 °F (36.7 °C)-104 °F (40 °C)] 98.9 °F (37.2 °C)  Pulse:  [] 109  Resp:  [15-51] 39  SpO2:  [93 %-100 %] 95 %  BP: ()/(51-77) 121/61     Weight: 79 kg (174 lb 2.6 oz)  Body mass index is 28.98 kg/m².    Intake/Output Summary (Last 24 hours) at 6/9/2024 1111  Last data filed at 6/9/2024 1101  Gross per 24 hour   Intake 4499.16 ml   Output 1300 ml   Net 3199.16 ml         Physical Exam  Vitals and nursing note reviewed.   Constitutional:       General: She is not in acute distress.     Appearance: She is ill-appearing.   HENT:      Head: Normocephalic and atraumatic.      Mouth/Throat:      Mouth: Mucous membranes are dry.   Cardiovascular:       Rate and Rhythm: Normal rate and regular rhythm.      Pulses: Normal pulses.   Pulmonary:      Effort: Pulmonary effort is normal. No respiratory distress.   Abdominal:      General: Bowel sounds are normal. There is no distension.      Palpations: Abdomen is soft.   Musculoskeletal:         General: No swelling.   Skin:     General: Skin is warm.   Neurological:      General: No focal deficit present.      Mental Status: She is alert.      Cranial Nerves: No cranial nerve deficit.      Coordination: Coordination normal.   Psychiatric:         Mood and Affect: Mood normal.         Behavior: Behavior normal.             Significant Labs: All pertinent labs within the past 24 hours have been reviewed.    Significant Imaging: I have reviewed all pertinent imaging results/findings within the past 24 hours.    Assessment/Plan:      * Meningoencephalitis  - Sepsis with b/l hydronephrosis, unclear origin of infection as urine is clean. No skin cellulitis or wounds. No oral/dental infx. No PNA. IV abx and robison placed. 103F despite V+Z, Stiff neck, shaking, Alterned mental status, Alert but not following any   commands, not tracking staring blank. Neurogenic bladder w Hydroneph- no bacteria or WBC in urine.     - Starting meningitis antibiotics. Meningitis meds started. Move to ICU. IR consult for LP. EEG ordered. ID and Neuro consult.   - LP performed on 6/8 --> Cell count/diff with 560 WBC, predominantly lymphocytes with elevated protein and glucose  - on broad spectrum meningitis antibiotics while cultures/labs return  - mental status is improving with current regimen  - appreciate ID recommendations  - EEG report pending though she is no longer having staring spells      Hydroureteronephrosis  Called and discussed case with Urology on-call.  We will follow up with the recommendations.  Appreciate their help.  Robison catheter placed and we will monitor urine output.      Fever, unknown origin  - suspect due to  meningitis        HLD (hyperlipidemia)  Resume statin.      DM (diabetes mellitus)  Patient's FSGs are controlled on current medication regimen.  Last A1c reviewed-   Lab Results   Component Value Date    HGBA1C 8.0 (H) 06/08/2024     Most recent fingerstick glucose reviewed-   Recent Labs   Lab 06/08/24  1809 06/08/24  2344   POCTGLUCOSE 253* 261*       Current correctional scale  Low  Maintain anti-hyperglycemic dose as follows-   Antihyperglycemics (From admission, onward)      Start     Stop Route Frequency Ordered    06/09/24 0945  insulin detemir U-100 (Levemir) pen 4 Units         -- SubQ 2 times daily 06/09/24 0831    06/07/24 1955  insulin aspart U-100 pen 0-5 Units         -- SubQ Before meals & nightly PRN 06/07/24 1855          Hold Oral hypoglycemics while patient is in the hospital.  We will follow up on A1c.  Diet is adjusted.    HTN (hypertension)  Chronic, controlled. Latest blood pressure and vitals reviewed-     Temp:  [98 °F (36.7 °C)-104 °F (40 °C)]   Pulse:  []   Resp:  [15-51]   BP: ()/(51-77)   SpO2:  [93 %-100 %] .   Home meds for hypertension were reviewed and noted below.   Hypertension Medications               lisinopriL (PRINIVIL,ZESTRIL) 20 MG tablet Take 20 mg by mouth 2 (two) times a day.    metoprolol succinate (TOPROL-XL) 200 MG 24 hr tablet Take 200 mg by mouth once daily.            While in the hospital, will manage blood pressure as follows; Continue home antihypertensive regimen    Will utilize p.r.n. blood pressure medication only if patient's blood pressure greater than 180/110 and she develops symptoms such as worsening chest pain or shortness of breath.      VTE Risk Mitigation (From admission, onward)           Ordered     enoxaparin injection 40 mg  Daily         06/07/24 1857     IP VTE HIGH RISK PATIENT  Once         06/07/24 1857     Place sequential compression device  Until discontinued         06/07/24 1857                    Discharge Planning   PHYLICIA:       Code Status: Full Code   Is the patient medically ready for discharge?:     Reason for patient still in hospital (select all that apply): Treatment  Discharge Plan A: Home with family            Critical care time spent on the evaluation and treatment of severe organ dysfunction, review of pertinent labs and imaging studies, discussions with consulting providers and discussions with patient/family: 35 minutes.      Erasmo Alvarez MD  Department of Hospital Medicine   [facility] - Intensive Care

## 2024-06-09 NOTE — ASSESSMENT & PLAN NOTE
Patient's FSGs are controlled on current medication regimen.  Last A1c reviewed-   Lab Results   Component Value Date    HGBA1C 8.0 (H) 06/08/2024     Most recent fingerstick glucose reviewed-   Recent Labs   Lab 06/08/24  1809 06/08/24  2344   POCTGLUCOSE 253* 261*       Current correctional scale  Low  Maintain anti-hyperglycemic dose as follows-   Antihyperglycemics (From admission, onward)    Start     Stop Route Frequency Ordered    06/09/24 0945  insulin detemir U-100 (Levemir) pen 4 Units         -- SubQ 2 times daily 06/09/24 0831    06/07/24 1955  insulin aspart U-100 pen 0-5 Units         -- SubQ Before meals & nightly PRN 06/07/24 1855        Hold Oral hypoglycemics while patient is in the hospital.  We will follow up on A1c.  Diet is adjusted.

## 2024-06-09 NOTE — CARE UPDATE
[facility] [place]  Nurses Note -- 4 Eyes      6/8/2024       Skin assessed on: Q Shift      [x] No Pressure Injuries Present    [x]Prevention Measures Documented    [] Yes LDA  for Pressure Injury Previously documented     [] Yes New Pressure Injury Discovered   [] LDA for New Pressure Injury Added      Attending RN:  Latricia Velasquez RN     Second RN:  Amarilis Cline RN

## 2024-06-09 NOTE — SUBJECTIVE & OBJECTIVE
Interval History: mental status improving today, she is more alert and awake per family at bedside. She does report having some right shoulder pain and lower back pain.     Review of Systems  Objective:     Vital Signs (Most Recent):  Temp: 98.9 °F (37.2 °C) (06/09/24 1101)  Pulse: 109 (06/09/24 1101)  Resp: (!) 39 (06/09/24 1101)  BP: 121/61 (06/09/24 1101)  SpO2: 95 % (06/09/24 1101) Vital Signs (24h Range):  Temp:  [98 °F (36.7 °C)-104 °F (40 °C)] 98.9 °F (37.2 °C)  Pulse:  [] 109  Resp:  [15-51] 39  SpO2:  [93 %-100 %] 95 %  BP: ()/(51-77) 121/61     Weight: 79 kg (174 lb 2.6 oz)  Body mass index is 28.98 kg/m².    Intake/Output Summary (Last 24 hours) at 6/9/2024 1111  Last data filed at 6/9/2024 1101  Gross per 24 hour   Intake 4499.16 ml   Output 1300 ml   Net 3199.16 ml         Physical Exam  Vitals and nursing note reviewed.   Constitutional:       General: She is not in acute distress.     Appearance: She is ill-appearing.   HENT:      Head: Normocephalic and atraumatic.      Mouth/Throat:      Mouth: Mucous membranes are dry.   Cardiovascular:      Rate and Rhythm: Normal rate and regular rhythm.      Pulses: Normal pulses.   Pulmonary:      Effort: Pulmonary effort is normal. No respiratory distress.   Abdominal:      General: Bowel sounds are normal. There is no distension.      Palpations: Abdomen is soft.   Musculoskeletal:         General: No swelling.   Skin:     General: Skin is warm.   Neurological:      General: No focal deficit present.      Mental Status: She is alert.      Cranial Nerves: No cranial nerve deficit.      Coordination: Coordination normal.   Psychiatric:         Mood and Affect: Mood normal.         Behavior: Behavior normal.             Significant Labs: All pertinent labs within the past 24 hours have been reviewed.    Significant Imaging: I have reviewed all pertinent imaging results/findings within the past 24 hours.

## 2024-06-09 NOTE — SUBJECTIVE & OBJECTIVE
"Past Medical History:   Diagnosis Date    Diabetes mellitus     Hypertension        No past surgical history on file.    Review of patient's allergies indicates:  No Known Allergies    Medications:  Medications Prior to Admission   Medication Sig    atorvastatin (LIPITOR) 10 MG tablet Take 10 mg by mouth once daily. Patient doesn't know the dose    cetirizine (ZYRTEC) 10 MG tablet Take 10 mg by mouth once daily.    diclofenac sodium (VOLTAREN) 1 % Gel Apply 2 g topically once daily.    HYDROcodone-acetaminophen (NORCO) 5-325 mg per tablet Take 1 tablet by mouth every 6 (six) hours as needed.    lisinopriL (PRINIVIL,ZESTRIL) 20 MG tablet Take 20 mg by mouth 2 (two) times a day.    metFORMIN (GLUCOPHAGE) 500 MG tablet Take 500 mg by mouth 2 (two) times daily with meals.    metoprolol succinate (TOPROL-XL) 200 MG 24 hr tablet Take 200 mg by mouth once daily.    omeprazole (PRILOSEC) 20 MG capsule Take 20 mg by mouth once daily.     Antibiotics (From admission, onward)      Start     Stop Route Frequency Ordered    06/08/24 2100  mupirocin 2 % ointment         06/13/24 2059 Nasl 2 times daily 06/08/24 1257    06/08/24 1700  vancomycin (VANCOCIN) 1,000 mg in dextrose 5 % (D5W) 250 mL IVPB (Vial-Mate)         -- IV Every 24 hours (non-standard times) 06/07/24 1922    06/08/24 1000  ampicillin (OMNIPEN) 2 g in sodium chloride 0.9 % 100 mL IVPB (MB+)  (Medication Panel)         -- IV Every 6 hours (non-standard times) 06/08/24 0900    06/08/24 1000  cefTRIAXone (ROCEPHIN) 2 g in dextrose 5 % in water (D5W) 100 mL IVPB (MB+)  (Medication Panel)         -- IV Every 12 hours (non-standard times) 06/08/24 0900    06/07/24 1952  vancomycin - pharmacy to dose  (vancomycin IVPB (PEDS and ADULTS))        Placed in "And" Linked Group    -- IV pharmacy to manage frequency 06/07/24 1853          Antifungals (From admission, onward)      None          Antivirals (From admission, onward)          Stop Route Frequency     acyclovir " (ZOVIRAX) injection         -- IV Every 8 hours (non-standard times)               There is no immunization history on file for this patient.    Family History    None       Social History     Socioeconomic History    Marital status:    Tobacco Use    Smoking status: Never    Smokeless tobacco: Never   Substance and Sexual Activity    Alcohol use: Not Currently     Social Determinants of Health     Financial Resource Strain: Low Risk  (6/9/2024)    Overall Financial Resource Strain (CARDIA)     Difficulty of Paying Living Expenses: Not very hard   Food Insecurity: No Food Insecurity (6/9/2024)    Hunger Vital Sign     Worried About Running Out of Food in the Last Year: Never true     Ran Out of Food in the Last Year: Never true   Transportation Needs: No Transportation Needs (6/9/2024)    TRANSPORTATION NEEDS     Transportation : No   Stress: No Stress Concern Present (6/9/2024)    [employer] [place] [employer] - Occupational Stress Questionnaire     Feeling of Stress : Only a little   Housing Stability: Low Risk  (6/9/2024)    Housing Stability Vital Sign     Unable to Pay for Housing in the Last Year: No     Homeless in the Last Year: No     Review of Systems   Constitutional:  Positive for chills, fatigue and fever.   HENT:  Positive for sinus pain. Negative for dental problem, ear discharge, ear pain, facial swelling and sore throat.    Respiratory:  Negative for cough, chest tightness and shortness of breath.    Gastrointestinal:  Negative for abdominal pain, diarrhea and nausea.   Genitourinary:  Negative for dysuria, flank pain and frequency.   Musculoskeletal:  Positive for back pain. Negative for arthralgias, gait problem, joint swelling and neck pain.   Skin:  Negative for color change and wound.   Psychiatric/Behavioral:  Confusion: now resolved.      Objective:     Vital Signs (Most Recent):  Temp: 98.6 °F (37 °C) (06/09/24 1300)  Pulse: (!) 118 (06/09/24 1527)  Resp: 16 (06/09/24  "1400)  BP: (!) 120/57 (06/09/24 1400)  SpO2: 97 % (06/09/24 1400) Vital Signs (24h Range):  Temp:  [98 °F (36.7 °C)-100.2 °F (37.9 °C)] 98.6 °F (37 °C)  Pulse:  [] 118  Resp:  [15-42] 16  SpO2:  [93 %-100 %] 97 %  BP: ()/(51-70) 120/57     Weight: 79 kg (174 lb 2.6 oz)  Body mass index is 28.98 kg/m².    Estimated Creatinine Clearance: 48.9 mL/min (based on SCr of 1 mg/dL).     Physical Exam  Vitals reviewed.   Constitutional:       Appearance: Normal appearance.   HENT:      Head: Normocephalic and atraumatic.      Nose:      Comments: ttp r maxillary sinus  Eyes:      Conjunctiva/sclera: Conjunctivae normal.      Pupils: Pupils are equal, round, and reactive to light.   Cardiovascular:      Rate and Rhythm: Normal rate and regular rhythm.   Pulmonary:      Effort: Pulmonary effort is normal.      Breath sounds: Normal breath sounds.   Abdominal:      General: There is no distension.   Musculoskeletal:         General: No swelling or deformity.      Cervical back: Normal range of motion.      Right lower leg: No edema.      Left lower leg: No edema.      Comments: No tender to palpation at spinal processes.   Skin:     Findings: No lesion or rash.   Neurological:      General: No focal deficit present.      Mental Status: She is alert and oriented to person, place, and time.   Psychiatric:         Mood and Affect: Mood normal.         Behavior: Behavior normal.          Significant Labs: CSF:   Recent Labs   Lab 06/08/24  1247   CSFCULTURE No Growth to date     Wound Culture: No results for input(s): "LABAERO" in the last 4320 hours.  All pertinent labs within the past 24 hours have been reviewed.    Significant Imaging: I have reviewed all pertinent imaging results/findings within the past 24 hours.  "

## 2024-06-09 NOTE — ASSESSMENT & PLAN NOTE
- suspect due to meningitis       Pallavi Benton was admitted for Hypothyroid [E03.9]  Generalized weakness [R53.1]  Edema of both legs [R60.0]  Leukocytosis, unspecified type [D72.829]  Hypothyroidism, unspecified type [E03.9]    Course of admission :   Pleasant lady with multiple medical problems including hypothyroidism, ESRD on PD, who presented with increased weakness, edema of lower extremities (anasarca) and electrolytes derangement. Concern is mainly that patient has not been proceeding with PD as has been previously recommended.     Through admission patient has been very debilitated. Limited participation with therapy services due to reported pain in the lower extremities (burning pain)    Exam has been negative for cellulitis. She had US of bilateral lower extremities which were negative for DVT. She's had persistent leukocytosis yet infectious workup has been negative. She had UA with minimal abnormalities but no bacteremia and negative culture, XR without pulmonary infiltrates. CT of the abdomen was obtained which was also negative for acute abdominal abnormalities but Severe degenerative changes of the lumbar spine resulting in severe L3-L4 and L4-L5 osseous foraminal stenosis    CC:   Weakness / debility / some pain in the lower extremities     Subjective :   Finally opened her bowels. Had a large bowel movement  No BRBPR nor melena noted in the stools  She is debilitated, but sat up to the chair with therapy today   No fevers nor chills. No CP or pressure no cough no dyspnea     She feels that I am a bit negative about her prospects of recovery     Blood pressure 113/53, pulse 65, temperature 97.9 °F (36.6 °C), temperature source Oral, resp. rate 18, height 5' 3\" (1.6 m), weight 87.8 kg, SpO2 96 %.    Weight    09/19/20 0815 09/20/20 0556 09/21/20 0500 09/22/20 1058   Weight: 87.8 kg 87 kg 85.4 kg 87.8 kg     Ill looking ; poor attention   Normocephalic   Supple neck   No nodes nor mass  Lung clear to auscultation anterior  No  accessory muscle use. No supplementla O2   S1S2 no GR, no tachycardia   +BS NTND soft, no palpable HSM  no inflammatory changes PD insertion  Now closer to trace pitting edema of lower extremities   No signs of phlebitis   Alert, oriented x 3  CN intact, speech non dysarthric   No focal motor deficits neither upper nor lower extremities      WBC   Date Value   09/21/2020 18.9 K/mcL (H)   06/02/2020 7.50 1000/mcL     HGB (g/dL)   Date Value   09/21/2020 7.3 (L)   07/23/2020 10.0 (L)     PLT   Date Value   09/21/2020 237 K/mcL   06/02/2020 210 1000/mcL   08/01/2013 164 K/mcL     Sodium   Date Value   09/22/2020 137 mmol/L   06/02/2020 140 mEq/L     Creatinine (mg/dL)   Date Value   09/22/2020 10.60 (H)   07/23/2020 6.30 (H)     Potassium   Date Value   09/22/2020 5.0 mmol/L   06/02/2020 4.9 mEq/L     Recent Labs   Lab 09/21/20  1752 09/21/20  2109 09/22/20  0857 09/22/20  1319   GLUCOSE BEDSIDE 135* 175* 153* 149*         Xr Chest Ap Or Pa    Result Date: 9/21/2020  EXAM:  XR CHEST AP OR PA HISTORY:  increased 02 needs COMPARISON:  9/14/2020 TECHNIQUE:  A single portable AP semierect view of the chest was obtained. FINDINGS:  The patient is rotated to the right pelvis study. The heart is upper limits of normal in size. There are no confluent infiltrates or pulmonary vascular congestive type changes seen. No pneumothorax.     IMPRESSION:  No active disease in the chest.     Ct Abdomen Pelvis Wo Contrast    Result Date: 9/20/2020  CT ABDOMEN PELVIS WO CONTRAST HISTORY: End-stage renal disease on peritoneal dialysis, fever, leukocytosis. COMPARISON: 9/23/2019 CT abdomen. TECHNIQUE:  Axial CT images of the abdomen and pelvis without IV or oral contrast. Multiplanar reformats. FINDINGS:  Examination of the visceral organs and vascular structures is limited by the lack of IV contrast. LOWER CHEST: Lung bases:  Mild bibasilar strandy opacification likely representing atelectasis and/or scarring. No pleural effusion. Heart:   Severe coronary artery calcifications and/or stents. Normal cardiac size. Aortic valvular calcifications. Intracardiac pacemaker device present. ABDOMEN: Liver:  Smooth hepatic contour. No focal hepatic lesion. Biliary system:  No gallbladder wall thickening or calcified stones. No biliary ductal dilation. Spleen:  Unremarkable. Pancreas:  Normal parenchymal attenuation. No peripancreatic fluid or stranding. No pancreatic ductal dilatation. Adrenal glands:  Redemonstrated 1.4 cm right adrenal adenoma. Unchanged left adrenal hyperplasia. Kidneys:  Renal atrophy bilaterally with multiple renal cysts bilaterally without concerning features. Unchanged appearance of a 3.1 cm ovoid structure measuring 56 x 4 units, likely representing a proteinaceous or hemorrhagic cyst. No calcified renal calculi. Bowel: The distal esophagus and stomach are unremarkable. The small bowel is normal in caliber with no abnormal wall thickening. The colon is unremarkable. Status post appendectomy. Retroperitoneum/mesentery:  Small amount of intraperitoneal free fluid. No lymphadenopathy. Peritoneal catheter terminates in the anterior pelvis. Vascular:  Moderate calcifications of the abdominal aorta and its branches. Osseous structures/subcutaneous tissues:  Scattered osseous degenerative changes with severe lumbar facet arthropathy. This results in severe right L3-L4 and L4-L5 foraminal stenosis. PELVIS: Unremarkable bladder, uterus, and adnexa.     IMPRESSION: 1.  No acute findings in the abdomen or pelvis. 2.  Bilateral renal atrophy consistent with known chronic kidney disease. 3.  Small amount of intraperitoneal free fluid. Peritoneal catheter terminates in the anterior pelvis. 4.  Unchanged right adrenal adenoma and left adrenal hyperplasia. 5.  Severe degenerative changes of the lumbar spine resulting in severe L3-L4 and L4-L5 osseous foraminal stenosis. I have personally reviewed the images and modified the resident's report as  necessary.            Assessment and Plan:   1. Lower extremity swelling/pain   - Etiology unclear likely multifactorial ( anemia/hypothyroidism/ESRD on HD/moderate pulmonary HTN).  - most recent ECHO showed EF of 60% with mild PHTN with RV pressure of 46mmHg (June 2020)   - bilateral LE doppler NEGATIVE FOR DVT  - from the CT A/P 9/20/2020 noted that has foraminal narrowing L3-4 and L4-5 but imaging not dedicated     2. Uncompensated Hypothyroidism   - as note by endocrinology, may be issue of absorption based on how patient has been taking her medications, as well as potential of visceral edema.   - after recent monitoring T4 0.4 ; most recent input by endocrinology is to continue current 200mcg dose of synthroid     3. ESRD on PD with suspected moderate uremia -   -no issues with PD through admission yet volume and BUN (especially the latter) remain quite elevated  - no BM in several days. No BRBPR noted. No evidence of Intraabdominal process based on non-contrast CT abd   - discussed with nephrology consultant, would probably recommend HD, but patient has and continues to decline   - I reviewed role of HD as well with patient today, but she is adamant to avoid hemodialysis as treatment option     4. Multiple electrolyte derangement (hyponatremia/hypokalemia/hypocalcemia)- anticipate improvement with dialysis     5. Anemia -chronic.   - there has been some decline through admission without obvious signs of bleeding  - no evidence of intraabdominal bleeding on CT without contrast. ; no active blood per rectum  - FOBT is positive, but there is not significant/large evidence of blood loss   - is on EPO    6. Generalized weakness    - participation with therapy has been limited by reported pain, as well as increased somnolence/lethargy  - could be that this is driven by Uremia, but there may be another unclear culprit as well   - given her Cr clearance discontinued (9/21/2020) gabapentin, as started to notice slight  choreiform movements      7. Nicotine dependence / Active Vaping : patient with past cigarette smoking, transition to vaping, advised to continue goals of cessation. No reported history of COPD, last PFT 2018 was without findings     8. Leukocytosis / weakness   - patient without fever or chills. No abdominal pain   - PD fluid not suggestive of infection   - no signs of cellulitis ; blood culture from admission NGTD   - ua had leukocyte esterase but no nitrite nor a significant pyuria. Culture is negative for bacteria or yeast    - hard to argument there is an UTI based on results, but no clear cause of considerable WBC - UTI is ruled out   - XR is negative ; repeat XR today negative. CT of the abdomen and pelvis is negative as well   - will discontinue antibiotics in presence of clear source  -- she is not having URI symptoms therefore low yield to obtain NP swab   - with leukocytosis/anemia/neuropathy will check SPEP >> obtained [id number]/Plasma cell profile (MM?)  - curbsided with Dr Yadav regarding leukocytosis. Pattern seems reactive in his view. [id number] and Plasma cell are likely affected by her ESRD (NO monoclonal protein). He advised to obtain a Jak2 qualitative and BCR/ABL to look for potential myeloproliferative disorder -- with this input will hold on a formal consult, but based on results, may need to either consult inpatient or referral to clinic for follow up     Patient Rep Burton (sister) [phone]       [place]: if starts to show functional progress consider referral to IPR. She definitely needs therapy, and PD would be a barrier for placement in [facility].     Barrier: medical   Destination: as above    James Del Rio MD   [facility] [place] [facility]   [phone]     For any needs or questions regarding Pallavi Benton, Please contact me from 7.00a to 19.00. For night coverage (19.00 to 7.00a) Please contact the Hospitalist on call at [phone].

## 2024-06-09 NOTE — PROCEDURES
ICU EEG/VIDEO MONITORING REPORT    Sussy Fulton  [id number]  1945    DATE OF SERVICE: 6/8/2024    DATE OF ADMISSION: 6/7/2024  9:56 AM    ADMITTING PROVIDER: Madhav Delgadillo MD    METHODOLOGY-cap   Electroencephalographic (EEG) recording is with electrodes placed according to the International 10-20 placement system.  Thirty two (32) channels of digital signal are simultaneously recorded from the scalp and may include EKG, EMG, and/or eye monitors.   Recording band pass was 0.1 to 512 hz.  Digital video recording of the patient is simultaneously recorded with the EEG.  The nursing staff report clinical symptoms and may press an event button when the patient has symptoms of clinical interest to the treating physicians.  EEG and video recording is stored and archived in digital format.  The entire recording is visually reviewed and the times identified by computer analysis as being spikes or seizures are reviewed again.  Activation procedures which include photic stimulation, hyperventilation and instructing patients to perform simple task are done in selected patients.   Compresses spectral analysis (CSA) is also performed on the activity recorded from each individual channel.  This is displayed as a power display of frequencies from 0 to 30 Hz over time.   The CSA analysis is done and displayed continuously.  This is reviewed for asymmetries in power between homologous areas of the scalp and for presence of changes in power which canbe seen when seizures occur.  Sections of suspected abnormalities on the CSA is then compared with the original EEG recording.     [employer] software was also utilized in the review of this study.  This software suite analyzes the EEG recording in multiple domains.  Coherence and rhythmicity is computed to identify EEG sections which may contain organized seizures.  Each channel undergoes analysis to detect presence of spike and sharp waves which have special and morphological  characteristic of epileptic activity.  The routine EEG recording is converted from spacial into frequency domain.  This is then displayed comparing homologous areas to identify areas of significant asymmetry.  Algorithm to identify non-cortically generated artifact is used to separate eye movement, EMG and other artifact from the EEG.      Recording Times  Start on 6/8/2024, 15:58  16:31  Stop on 6/8/2024, 19:50  Break: 16:15 - 16:31 = 00:26    A total of 2 hours and 33 minutes of EEG was recorded.    EEG FINDINGS - interpretation of cap EEG is limited due to artifacts. Interpretable portions suggest:  The study is done without sedation.    Background activity:   The background rhythm was characterized by theta-alpha (6-8 Hz) activity with a 8 Hz posterior dominant alpha rhythm at 30-70 microvolts.   Symmetry and continuity: the background was continuous; unable to assess symmetry    Sleep:   Not seen.    Activation procedures:   Not done    Abnormal activity:   No epileptiform discharges, periodic discharges, lateralized rhythmic delta activity or electrographic seizures were seen.    No push button activations noted for events of concern.    EKG:   - unable to assess    IMPRESSION: Interpretable portions of cap-EEG suggest:  This is an abnormal cap-EEG due to the mild generalized slowing seen, suggestive of mild diffuse cerebral dysfunction.  No epileptiform activity or electrographic seizures seen.    CLINICAL CORRELATION IS RECOMMENDED.    Afsaneh Zamora MD, BRETT(), DANNY COOK.  Neurology-Epilepsy.  [facility].

## 2024-06-09 NOTE — PLAN OF CARE
Pt transferred to ICU from floor. Pt arrived alert, nonverbal, shaking, hot to touch, not following commands, neck stiffness with head facing left, and able to move head with assistance and rigidity noted. Rectal temp 104; medication given, ice packs applied x3, room temp lowered, and sheets and blankets removed. LP and EEG performed and completed. Midline placed and PIVs exchanged. Abx changed and given. 1500; pt began talking, following commands, only oriented to self, and c/o lower back pain, medication given; temp decreased to 98.5 oral but pt felt hot to touch, rectal temp 101; Dr. Delgadillo notified. Minimal urine output 350ml; urology ok to remove robison, robison to be removed 6/9 AM per Dr. Delgadillo. Pt's sister's x2 at bedside and updated on POC throughout shift.   Problem: Adult Inpatient Plan of Care  Goal: Plan of Care Review  Outcome: Progressing  Goal: Patient-Specific Goal (Individualized)  Outcome: Progressing  Goal: Absence of Hospital-Acquired Illness or Injury  Outcome: Progressing  Goal: Optimal Comfort and Wellbeing  Outcome: Progressing  Goal: Readiness for Transition of Care  Outcome: Progressing     Problem: Diabetes Comorbidity  Goal: Blood Glucose Level Within Targeted Range  Outcome: Progressing     Problem: Infection  Goal: Absence of Infection Signs and Symptoms  Outcome: Progressing     Problem: Skin Injury Risk Increased  Goal: Skin Health and Integrity  Outcome: Progressing     Problem: Fall Injury Risk  Goal: Absence of Fall and Fall-Related Injury  Outcome: Progressing

## 2024-06-09 NOTE — PLAN OF CARE
Problem: Adult Inpatient Plan of Care  Goal: Plan of Care Review  Outcome: Progressing  Flowsheets (Taken 6/9/2024 1810)  Plan of Care Reviewed With: patient  Goal: Patient-Specific Goal (Individualized)  Outcome: Progressing  Goal: Absence of Hospital-Acquired Illness or Injury  Outcome: Progressing  Goal: Optimal Comfort and Wellbeing  Outcome: Progressing  Goal: Readiness for Transition of Care  Outcome: Progressing     Problem: Diabetes Comorbidity  Goal: Blood Glucose Level Within Targeted Range  Outcome: Progressing  Intervention: Monitor and Manage Glycemia  Flowsheets (Taken 6/9/2024 1810)  Glycemic Management:   blood glucose monitored   supplemental insulin given     Problem: Infection  Goal: Absence of Infection Signs and Symptoms  Outcome: Progressing  Intervention: Prevent or Manage Infection  Flowsheets (Taken 6/9/2024 1810)  Isolation Precautions:   precautions initiated   droplet     Problem: Skin Injury Risk Increased  Goal: Skin Health and Integrity  Outcome: Progressing     Problem: Fall Injury Risk  Goal: Absence of Fall and Fall-Related Injury  Outcome: Progressing

## 2024-06-10 LAB
ALBUMIN SERPL BCP-MCNC: 2.4 G/DL (ref 3.5–5.2)
ALP SERPL-CCNC: 43 U/L (ref 55–135)
ALT SERPL W/O P-5'-P-CCNC: 14 U/L (ref 10–44)
ANION GAP SERPL CALC-SCNC: 7 MMOL/L (ref 8–16)
AST SERPL-CCNC: 21 U/L (ref 10–40)
BASOPHILS # BLD AUTO: 0.01 K/UL (ref 0–0.2)
BASOPHILS NFR BLD: 0.1 % (ref 0–1.9)
BILIRUB SERPL-MCNC: 0.3 MG/DL (ref 0.1–1)
BUN SERPL-MCNC: 27 MG/DL (ref 8–23)
CALCIUM SERPL-MCNC: 7.6 MG/DL (ref 8.7–10.5)
CHLORIDE SERPL-SCNC: 106 MMOL/L (ref 95–110)
CO2 SERPL-SCNC: 24 MMOL/L (ref 23–29)
CREAT SERPL-MCNC: 1.2 MG/DL (ref 0.5–1.4)
DIFFERENTIAL METHOD BLD: ABNORMAL
EOSINOPHIL # BLD AUTO: 0 K/UL (ref 0–0.5)
EOSINOPHIL NFR BLD: 0 % (ref 0–8)
ERYTHROCYTE [DISTWIDTH] IN BLOOD BY AUTOMATED COUNT: 13.1 % (ref 11.5–14.5)
EST. GFR  (NO RACE VARIABLE): 47 ML/MIN/1.73 M^2
GLUCOSE SERPL-MCNC: 230 MG/DL (ref 70–110)
HCT VFR BLD AUTO: 34.5 % (ref 37–48.5)
HGB BLD-MCNC: 11.5 G/DL (ref 12–16)
IMM GRANULOCYTES # BLD AUTO: 0.05 K/UL (ref 0–0.04)
IMM GRANULOCYTES NFR BLD AUTO: 0.4 % (ref 0–0.5)
LYMPHOCYTES # BLD AUTO: 0.7 K/UL (ref 1–4.8)
LYMPHOCYTES NFR BLD: 6.4 % (ref 18–48)
MAGNESIUM SERPL-MCNC: 1.7 MG/DL (ref 1.6–2.6)
MCH RBC QN AUTO: 27.9 PG (ref 27–31)
MCHC RBC AUTO-ENTMCNC: 33.3 G/DL (ref 32–36)
MCV RBC AUTO: 84 FL (ref 82–98)
MONOCYTES # BLD AUTO: 0.6 K/UL (ref 0.3–1)
MONOCYTES NFR BLD: 4.8 % (ref 4–15)
NEUTROPHILS # BLD AUTO: 10.2 K/UL (ref 1.8–7.7)
NEUTROPHILS NFR BLD: 88.3 % (ref 38–73)
NRBC BLD-RTO: 0 /100 WBC
OHS QRS DURATION: 132 MS
OHS QRS DURATION: 134 MS
OHS QTC CALCULATION: 490 MS
OHS QTC CALCULATION: 514 MS
PHOSPHATE SERPL-MCNC: 2.4 MG/DL (ref 2.7–4.5)
PLATELET # BLD AUTO: 170 K/UL (ref 150–450)
PMV BLD AUTO: 10.8 FL (ref 9.2–12.9)
POCT GLUCOSE: 200 MG/DL (ref 70–110)
POCT GLUCOSE: 210 MG/DL (ref 70–110)
POCT GLUCOSE: 224 MG/DL (ref 70–110)
POCT GLUCOSE: 241 MG/DL (ref 70–110)
POTASSIUM SERPL-SCNC: 3.3 MMOL/L (ref 3.5–5.1)
PROT SERPL-MCNC: 6 G/DL (ref 6–8.4)
RBC # BLD AUTO: 4.12 M/UL (ref 4–5.4)
SODIUM SERPL-SCNC: 137 MMOL/L (ref 136–145)
WBC # BLD AUTO: 11.54 K/UL (ref 3.9–12.7)

## 2024-06-10 PROCEDURE — 80053 COMPREHEN METABOLIC PANEL: CPT | Performed by: STUDENT IN AN ORGANIZED HEALTH CARE EDUCATION/TRAINING PROGRAM

## 2024-06-10 PROCEDURE — 85025 COMPLETE CBC W/AUTO DIFF WBC: CPT | Performed by: STUDENT IN AN ORGANIZED HEALTH CARE EDUCATION/TRAINING PROGRAM

## 2024-06-10 PROCEDURE — 63600175 PHARM REV CODE 636 W HCPCS: Performed by: STUDENT IN AN ORGANIZED HEALTH CARE EDUCATION/TRAINING PROGRAM

## 2024-06-10 PROCEDURE — 83735 ASSAY OF MAGNESIUM: CPT | Performed by: STUDENT IN AN ORGANIZED HEALTH CARE EDUCATION/TRAINING PROGRAM

## 2024-06-10 PROCEDURE — 99232 SBSQ HOSP IP/OBS MODERATE 35: CPT | Mod: ,,, | Performed by: STUDENT IN AN ORGANIZED HEALTH CARE EDUCATION/TRAINING PROGRAM

## 2024-06-10 PROCEDURE — 11000001 HC ACUTE MED/SURG PRIVATE ROOM

## 2024-06-10 PROCEDURE — A4216 STERILE WATER/SALINE, 10 ML: HCPCS | Performed by: STUDENT IN AN ORGANIZED HEALTH CARE EDUCATION/TRAINING PROGRAM

## 2024-06-10 PROCEDURE — 36415 COLL VENOUS BLD VENIPUNCTURE: CPT | Performed by: STUDENT IN AN ORGANIZED HEALTH CARE EDUCATION/TRAINING PROGRAM

## 2024-06-10 PROCEDURE — 94760 N-INVAS EAR/PLS OXIMETRY 1: CPT

## 2024-06-10 PROCEDURE — 25000003 PHARM REV CODE 250: Performed by: STUDENT IN AN ORGANIZED HEALTH CARE EDUCATION/TRAINING PROGRAM

## 2024-06-10 PROCEDURE — 99900035 HC TECH TIME PER 15 MIN (STAT)

## 2024-06-10 PROCEDURE — 27000207 HC ISOLATION

## 2024-06-10 PROCEDURE — 84100 ASSAY OF PHOSPHORUS: CPT | Performed by: STUDENT IN AN ORGANIZED HEALTH CARE EDUCATION/TRAINING PROGRAM

## 2024-06-10 PROCEDURE — 25000003 PHARM REV CODE 250: Performed by: INTERNAL MEDICINE

## 2024-06-10 RX ORDER — METOPROLOL SUCCINATE 50 MG/1
100 TABLET, EXTENDED RELEASE ORAL DAILY
Status: DISCONTINUED | OUTPATIENT
Start: 2024-06-10 | End: 2024-06-17 | Stop reason: HOSPADM

## 2024-06-10 RX ORDER — CETIRIZINE HYDROCHLORIDE 10 MG/1
10 TABLET ORAL DAILY
Status: DISCONTINUED | OUTPATIENT
Start: 2024-06-10 | End: 2024-06-17 | Stop reason: HOSPADM

## 2024-06-10 RX ORDER — POTASSIUM CHLORIDE 20 MEQ/1
60 TABLET, EXTENDED RELEASE ORAL ONCE
Status: COMPLETED | OUTPATIENT
Start: 2024-06-10 | End: 2024-06-10

## 2024-06-10 RX ORDER — INSULIN GLARGINE 100 [IU]/ML
6 INJECTION, SOLUTION SUBCUTANEOUS 2 TIMES DAILY
Status: DISCONTINUED | OUTPATIENT
Start: 2024-06-10 | End: 2024-06-12

## 2024-06-10 RX ORDER — LISINOPRIL 20 MG/1
20 TABLET ORAL DAILY
Status: DISCONTINUED | OUTPATIENT
Start: 2024-06-10 | End: 2024-06-13

## 2024-06-10 RX ADMIN — CETIRIZINE HYDROCHLORIDE 10 MG: 10 TABLET, FILM COATED ORAL at 02:06

## 2024-06-10 RX ADMIN — MUPIROCIN: 20 OINTMENT TOPICAL at 08:06

## 2024-06-10 RX ADMIN — DEXAMETHASONE SODIUM PHOSPHATE 10 MG: 4 INJECTION INTRA-ARTICULAR; INTRALESIONAL; INTRAMUSCULAR; INTRAVENOUS; SOFT TISSUE at 11:06

## 2024-06-10 RX ADMIN — INSULIN GLARGINE 6 UNITS: 100 INJECTION, SOLUTION SUBCUTANEOUS at 08:06

## 2024-06-10 RX ADMIN — Medication 10 ML: at 05:06

## 2024-06-10 RX ADMIN — ACYCLOVIR SODIUM 570 MG: 50 INJECTION, SOLUTION INTRAVENOUS at 12:06

## 2024-06-10 RX ADMIN — INSULIN DETEMIR 4 UNITS: 100 INJECTION, SOLUTION SUBCUTANEOUS at 08:06

## 2024-06-10 RX ADMIN — METOPROLOL SUCCINATE 100 MG: 50 TABLET, EXTENDED RELEASE ORAL at 08:06

## 2024-06-10 RX ADMIN — ACETAMINOPHEN 650 MG: 325 TABLET ORAL at 09:06

## 2024-06-10 RX ADMIN — LEVETIRACETAM 500 MG: 100 INJECTION, SOLUTION INTRAVENOUS at 08:06

## 2024-06-10 RX ADMIN — DEXAMETHASONE SODIUM PHOSPHATE 10 MG: 4 INJECTION INTRA-ARTICULAR; INTRALESIONAL; INTRAMUSCULAR; INTRAVENOUS; SOFT TISSUE at 12:06

## 2024-06-10 RX ADMIN — DEXAMETHASONE SODIUM PHOSPHATE 10 MG: 4 INJECTION INTRA-ARTICULAR; INTRALESIONAL; INTRAMUSCULAR; INTRAVENOUS; SOFT TISSUE at 05:06

## 2024-06-10 RX ADMIN — CEFTRIAXONE 2 G: 2 INJECTION, POWDER, FOR SOLUTION INTRAMUSCULAR; INTRAVENOUS at 11:06

## 2024-06-10 RX ADMIN — INSULIN ASPART 5 UNITS: 100 INJECTION, SOLUTION INTRAVENOUS; SUBCUTANEOUS at 06:06

## 2024-06-10 RX ADMIN — ACYCLOVIR SODIUM 570 MG: 50 INJECTION, SOLUTION INTRAVENOUS at 04:06

## 2024-06-10 RX ADMIN — AMPICILLIN SODIUM 2 G: 2 INJECTION, POWDER, FOR SOLUTION INTRAMUSCULAR; INTRAVENOUS at 09:06

## 2024-06-10 RX ADMIN — INSULIN ASPART 2 UNITS: 100 INJECTION, SOLUTION INTRAVENOUS; SUBCUTANEOUS at 12:06

## 2024-06-10 RX ADMIN — INSULIN ASPART 2 UNITS: 100 INJECTION, SOLUTION INTRAVENOUS; SUBCUTANEOUS at 09:06

## 2024-06-10 RX ADMIN — Medication 10 ML: at 11:06

## 2024-06-10 RX ADMIN — CEFTRIAXONE 2 G: 2 INJECTION, POWDER, FOR SOLUTION INTRAMUSCULAR; INTRAVENOUS at 12:06

## 2024-06-10 RX ADMIN — Medication 10 ML: at 12:06

## 2024-06-10 RX ADMIN — VANCOMYCIN HYDROCHLORIDE 1250 MG: 1.25 INJECTION, POWDER, LYOPHILIZED, FOR SOLUTION INTRAVENOUS at 09:06

## 2024-06-10 RX ADMIN — SODIUM CHLORIDE: 9 INJECTION, SOLUTION INTRAVENOUS at 06:06

## 2024-06-10 RX ADMIN — ACETAMINOPHEN 650 MG: 325 TABLET ORAL at 07:06

## 2024-06-10 RX ADMIN — ENOXAPARIN SODIUM 40 MG: 40 INJECTION SUBCUTANEOUS at 05:06

## 2024-06-10 RX ADMIN — POTASSIUM CHLORIDE 60 MEQ: 1500 TABLET, EXTENDED RELEASE ORAL at 09:06

## 2024-06-10 RX ADMIN — INSULIN ASPART 4 UNITS: 100 INJECTION, SOLUTION INTRAVENOUS; SUBCUTANEOUS at 08:06

## 2024-06-10 RX ADMIN — AMPICILLIN SODIUM 2 G: 2 INJECTION, POWDER, FOR SOLUTION INTRAMUSCULAR; INTRAVENOUS at 05:06

## 2024-06-10 RX ADMIN — MUPIROCIN: 20 OINTMENT TOPICAL at 09:06

## 2024-06-10 RX ADMIN — ACYCLOVIR SODIUM 570 MG: 50 INJECTION, SOLUTION INTRAVENOUS at 08:06

## 2024-06-10 RX ADMIN — INSULIN ASPART 5 UNITS: 100 INJECTION, SOLUTION INTRAVENOUS; SUBCUTANEOUS at 08:06

## 2024-06-10 RX ADMIN — LISINOPRIL 20 MG: 20 TABLET ORAL at 02:06

## 2024-06-10 RX ADMIN — INSULIN ASPART 5 UNITS: 100 INJECTION, SOLUTION INTRAVENOUS; SUBCUTANEOUS at 12:06

## 2024-06-10 RX ADMIN — AMPICILLIN SODIUM 2 G: 2 INJECTION, POWDER, FOR SOLUTION INTRAMUSCULAR; INTRAVENOUS at 03:06

## 2024-06-10 NOTE — SUBJECTIVE & OBJECTIVE
Interval History:  Patient's mentation is improving.  Patient reports feeling much better.  Restart home blood pressure medications.  Pending MRI.  CSF cultures pending  Review of Systems   Constitutional: Negative.    Respiratory: Negative.     Cardiovascular: Negative.    Gastrointestinal: Negative.    Genitourinary: Negative.    Musculoskeletal: Negative.    Neurological:  Positive for weakness.     Objective:     Vital Signs (Most Recent):  Temp: 98.5 °F (36.9 °C) (06/10/24 1116)  Pulse: 79 (06/10/24 1116)  Resp: 18 (06/10/24 1116)  BP: (!) 179/119 (06/10/24 1116)  SpO2: 100 % (06/10/24 1116) Vital Signs (24h Range):  Temp:  [97.9 °F (36.6 °C)-98.9 °F (37.2 °C)] 98.5 °F (36.9 °C)  Pulse:  [] 79  Resp:  [16-23] 18  SpO2:  [95 %-100 %] 100 %  BP: (120-179)/() 179/119     Weight: 79 kg (174 lb 2.6 oz)  Body mass index is 28.98 kg/m².    Intake/Output Summary (Last 24 hours) at 6/10/2024 1234  Last data filed at 6/9/2024 2141  Gross per 24 hour   Intake 134.95 ml   Output 30 ml   Net 104.95 ml         Physical Exam  Constitutional:       General: She is not in acute distress.     Appearance: She is normal weight.   Cardiovascular:      Rate and Rhythm: Normal rate.      Pulses: Normal pulses.   Pulmonary:      Effort: No respiratory distress.      Breath sounds: Normal breath sounds. No wheezing.   Abdominal:      General: Bowel sounds are normal. There is no distension.      Palpations: Abdomen is soft.      Tenderness: There is no abdominal tenderness.   Musculoskeletal:      Right lower leg: No edema.      Left lower leg: No edema.   Skin:     General: Skin is warm.   Neurological:      Mental Status: She is alert and oriented to person, place, and time. Mental status is at baseline.      Motor: No weakness.   Psychiatric:         Mood and Affect: Mood normal.             Significant Labs: All pertinent labs within the past 24 hours have been reviewed.    Significant Imaging: I have reviewed all  pertinent imaging results/findings within the past 24 hours.

## 2024-06-10 NOTE — NURSING
[facility] MEWS TRIGGER FOLLOW UP       MEWS Monitoring, Score is: 3  Indication for review:     Bedside Nurse, Giovani, contacted, we saw the patient at bedside and obtained a rectal temp of 99.  Patient was medicated for shoulder pain and gas pain.  She appears comfortable in the bed.  Blood pressures have been: 126/68, 141/82, 126/64.  HR has sustained in the 115-128 range with occasional dips into the 90s.  100mg Toprol XL ordered per LYUBOV Mistry.  Will follow up with blood pressure and heart rate.    0015: HR 79, /67

## 2024-06-10 NOTE — PLAN OF CARE
Problem: Adult Inpatient Plan of Care  Goal: Plan of Care Review  Outcome: Progressing  Flowsheets (Taken 6/10/2024 0515)  Plan of Care Reviewed With: patient  Goal: Absence of Hospital-Acquired Illness or Injury  Outcome: Progressing  Intervention: Identify and Manage Fall Risk  Flowsheets (Taken 6/10/2024 0515)  Safety Promotion/Fall Prevention:   assistive device/personal item within reach   Fall Risk reviewed with patient/family   family to remain at bedside   room near unit station   side rails raised x 2  Intervention: Prevent Skin Injury  Flowsheets (Taken 6/10/2024 0515)  Body Position: position changed independently  Skin Protection: incontinence pads utilized  Device Skin Pressure Protection:   adhesive use limited   positioning supports utilized  Intervention: Prevent and Manage VTE (Venous Thromboembolism) Risk  Flowsheets (Taken 6/10/2024 0515)  VTE Prevention/Management:   bleeding risk assessed   intravenous hydration  Intervention: Prevent Infection  Flowsheets (Taken 6/10/2024 0515)  Infection Prevention:   rest/sleep promoted   single patient room provided     Patient is AAOx4. On room air. With IVF of 0.9% NaCl infusing at 75ml/hr. Tele maintained. No falls or injuries reported during shift, safety precautions maintained.

## 2024-06-10 NOTE — PROGRESS NOTES
[facility]  Infectious Disease  Progress Note    Patient Name: Sussy Fulton  MRN: [id number]  Admission Date: 6/7/2024  Length of Stay: 3 days  Attending Physician: Sofia Blanchard,*  Primary Care Provider: Arlen Rivera MD    Isolation Status: Droplet  Assessment/Plan:      Other  Fever, unknown origin  78y/o F with weakness, malaise and fevers x 1 day. Source remains unclear. UA bland, no leukocytosis, CTH wnl, CT a/p with hydroureteronephrosis (no obstructing stones s/p robison) otherwise unremarkable. UA neg x 2. Noted to have neck rigidity and worsening encephalopathy concerning for meningitis. Broadened to vanc, ceftriaxone, ampicillin and acyclovir. LP revealed cloudy csf (wbc 500/ lymp / prtn 223/ gluc 100). Ampho started due to concern for fungal cns infection. Recent URI may be source of cns infection, LP results more consistent with viral etiology. Also with new lower back pain, but unclear if this is worse post-LP. Fever curve improving and mental status now has returned to baseline. No risk factors for fungal cns infection including animal exposures, travel, immunocompromised status. Encephalitis panel negative. HIV neg. EEG w/o seizures. RIP neg.     Recommendations:  --ok to continue ampicillin, vanc, ceftriaxone pending csf cultures. Anticipate stopping if cultures finalize as negative.  --ok to continue acyclovir pending HSV and VZV pcr   --f/u HSV PCR, VZV PCR, west nile, VDRL, csf cultures  --if febrile repeat bcx, consider MRI brain and L spine        Above discussed with primary team.     Time: 35 minutes   50% of time spent on face-to-face counseling and coordination of care. Counseling included review of test results, diagnosis, and treatment plan with patient and/or family.  I have reviewed hospital notes from  service and other specialty providers. I have also reviewed CBC, CMP/BMP,  cultures and imaging with my interpretation as documented.       Anticipated  "Disposition: TBD    Thank you for your consult. I will follow-up with patient. Please contact us if you have any additional questions.    Marla Becerril MD  Infectious Disease  [facility] - Telemetry    Subjective:     Principal Problem:Meningoencephalitis    HPI: 80y/o F pt w hx of HTN, HLD, T2DM  presented to the ER with family due to weakness x1 day and not feeling well and was admitted 6/7 for presumed sepsis with unclear source.    Pt denied nausea, vomiting, diarrhea, shortness breath or cough.  States that she started feeling bad after eating Chinese food with her family last night.  Denies sick contacts.      In the ED  pt was febrile to 103°.  Labs remarkable for wbc 11.8, LA wnl, UA bland,  COVID neg.  Started on empiric vanc and Zosyn. CT head unremarkable. CT of the abd/pel revealed: "Bilateral hydroureteronephrosis and moderate urinary bladder distension.  No nephrolithiasis.  Findings may be related to obstructive uropathy." Urology consulted and recommended robison placement and continued IV antibiotics.  Culture sent from the Robison placed and pending.    Hospital course c/b fevers despite bs abx and worsening acute encephalopathy prompting transfer to icu and broadening of abx to vacn, ceftriaxone, ampicillin and acyclovir for meningitis coverage.    ID consulted for: "103F despite V+Z, Stiff neck, shaking, Alterned mental status, Alert but not following any commands, not tracking staring blank. ?neuorgenic bladder w Hydroneph- no bacterio or WBC in urine "    Pt reports recent sinusitis 1 wk prior for which she received a steroid injection and albuterol. Notes new b/l lower back pain on day of presentation, but otherwise no new symptoms. Denies sick contacts, animal exposures, recent travel or recent surgeries/ dental procedures. Does not work. Lives with her  who is a dialysis pt and her son. Denies having eaten any unpasteurized dairy products. Ate chinese food tues, but nobody else got " "sick.   Interval History: family at bedside states she is improved from admission, but still intermittently confused. Discussed days prior to presentation. Had URI like symptoms that resolved followed by fatigue and "not feeling well".  stated that she was behaving differently. Her sinus symptoms are gone. Had an episode of emesis previously that has resolved. Still has headache and eye pain, but improved. Has right shoulder pain.     Review of Systems   Constitutional:  Positive for activity change, appetite change, fatigue and fever.   HENT:  Positive for sore throat.    Eyes:  Positive for pain and visual disturbance.   Respiratory: Negative.     Cardiovascular:  Positive for chest pain.   Gastrointestinal:  Positive for abdominal pain, constipation and nausea.   Endocrine: Negative.    Genitourinary:  Positive for dysuria.   Musculoskeletal:  Positive for back pain.   Skin:  Negative for rash and wound.   Allergic/Immunologic: Negative.    Neurological:  Positive for speech difficulty and weakness.   Hematological: Negative.    Psychiatric/Behavioral:  Positive for confusion.      Objective:     Vital Signs (Most Recent):  Temp: 98.5 °F (36.9 °C) (06/10/24 1116)  Pulse: 79 (06/10/24 1116)  Resp: 18 (06/10/24 1116)  BP: (!) 179/119 (06/10/24 1116)  SpO2: 100 % (06/10/24 1116) Vital Signs (24h Range):  Temp:  [97.9 °F (36.6 °C)-98.9 °F (37.2 °C)] 98.5 °F (36.9 °C)  Pulse:  [] 79  Resp:  [16-23] 18  SpO2:  [95 %-100 %] 100 %  BP: (120-179)/() 179/119     Weight: 79 kg (174 lb 2.6 oz)  Body mass index is 28.98 kg/m².    Estimated Creatinine Clearance: 40.8 mL/min (based on SCr of 1.2 mg/dL).     Physical Exam  Vitals and nursing note reviewed.   Constitutional:       Appearance: Normal appearance. She is not ill-appearing.   HENT:      Head: Normocephalic.      Mouth/Throat:      Mouth: Mucous membranes are moist.      Pharynx: Oropharynx is clear. No oropharyngeal exudate or posterior " oropharyngeal erythema.   Eyes:      General: No scleral icterus.        Right eye: No discharge.         Left eye: No discharge.   Pulmonary:      Effort: Pulmonary effort is normal. No respiratory distress.   Abdominal:      Palpations: Abdomen is soft.      Tenderness: There is abdominal tenderness (RUQ).   Musculoskeletal:         General: Tenderness (Right shoulder) present.   Skin:     General: Skin is warm and dry.      Findings: No rash.   Neurological:      General: No focal deficit present.      Mental Status: She is alert and oriented to person, place, and time.   Psychiatric:         Mood and Affect: Mood normal.         Behavior: Behavior normal.          Significant Labs:   Microbiology Results (last 7 days)       Procedure Component Value Units Date/Time    CSF culture [[id number]] Collected: 06/08/24 1247    Order Status: Completed Specimen: CSF (Spinal Fluid) from CSF Tap, Tube 1 Updated: 06/10/24 0734     CSF CULTURE No Growth to date     Gram Stain Result Cytospin indicates:      Many WBC's      No organisms seen    Blood culture [[id number]] Collected: 06/08/24 0542    Order Status: Completed Specimen: Blood from Peripheral, Hand, Right Updated: 06/10/24 0703     Blood Culture, Routine No Growth to date      No Growth to date      No Growth to date    Cryptococcal antigen, CSF [[id number]] Collected: 06/08/24 1247    Order Status: Completed Specimen: CSF (Spinal Fluid) from CSF Tap, Tube 1 Updated: 06/09/24 1722     Crypto Ag, CSF Negative    Blood culture x two cultures. Draw prior to antibiotics. [[id number]] Collected: 06/07/24 1124    Order Status: Completed Specimen: Blood from Peripheral, Antecubital, Left Updated: 06/09/24 1303     Blood Culture, Routine No Growth to date      No Growth to date      No Growth to date    Narrative:      Aerobic and anaerobic    Blood culture x two cultures. Draw prior to antibiotics. [[id number]] Collected: 06/07/24 1138    Order Status: Completed Specimen:  Blood from Peripheral, Upper Arm, Right Updated: 06/09/24 1303     Blood Culture, Routine No Growth to date      No Growth to date      No Growth to date    Narrative:      Aerobic and anaerobic    Respiratory Infection Panel (PCR), Nasopharyngeal [[id number]] Collected: 06/07/24 2222    Order Status: Completed Specimen: Nasopharyngeal Swab Updated: 06/08/24 2058     Respiratory Infection Panel Source NP Swab     Adenovirus Not Detected     Coronavirus 229E, Common Cold Virus Not Detected     Coronavirus HKU1, Common Cold Virus Not Detected     Coronavirus NL63, Common Cold Virus Not Detected     Coronavirus OC43, Common Cold Virus Not Detected     Comment: The Coronavirus strains detected in this test cause the common cold.  These strains are not the COVID-19 (novel Coronavirus)strain   associated with the respiratory disease outbreak.          SARS-CoV2 (COVID-19) Qualitative PCR Not Detected     Human Metapneumovirus Not Detected     Human Rhinovirus/Enterovirus Not Detected     Influenza A (subtypes H1, H1-2009,H3) Not Detected     Influenza B Not Detected     Parainfluenza Virus 1 Not Detected     Parainfluenza Virus 2 Not Detected     Parainfluenza Virus 3 Not Detected     Parainfluenza Virus 4 Not Detected     Respiratory Syncytial Virus Not Detected     Bordetella Parapertussis (FQ4722) Not Detected     Bordetella pertussis (ptxP) Not Detected     Chlamydia pneumoniae Not Detected     Mycoplasma pneumoniae Not Detected    Narrative:      Assay not valid for lower respiratory specimens, alternate  testing required.    Fungus culture [[id number]] Collected: 06/08/24 1247    Order Status: Sent Specimen: CSF (Spinal Fluid) from CSF Tap, Tube 1 Updated: 06/08/24 1312    Gram stain [[id number]] Collected: 06/08/24 1247    Order Status: Canceled Specimen: CSF (Spinal Fluid) from CSF Tap, Tube 1     Respiratory Infection Panel (PCR), Nasopharyngeal [[id number]]     Order Status: Canceled Specimen: Nasopharyngeal  Swab     Culture, Anaerobe [[id number]]     Order Status: Canceled Specimen: CSF (Spinal Fluid)     Aerobic culture [[id number]]     Order Status: Canceled Specimen: CSF (Spinal Fluid)     Blood culture x two cultures. Draw prior to antibiotics. [[id number]]     Order Status: Canceled Specimen: Blood     Blood culture x two cultures. Draw prior to antibiotics. [[id number]]     Order Status: Canceled Specimen: Blood             Significant Imaging: I have reviewed all pertinent imaging results/findings within the past 24 hours.

## 2024-06-10 NOTE — ASSESSMENT & PLAN NOTE
80y/o F with weakness, malaise and fevers x 1 day. Source remains unclear. UA bland, no leukocytosis, CTH wnl, CT a/p with hydroureteronephrosis (no obstructing stones s/p robison) otherwise unremarkable. UA neg x 2. Noted to have neck rigidity and worsening encephalopathy concerning for meningitis. Broadened to vanc, ceftriaxone, ampicillin and acyclovir. LP revealed cloudy csf (wbc 500/ lymp / prtn 223/ gluc 100). Ampho started due to concern for fungal cns infection. Recent URI may be source of cns infection, LP results more consistent with viral etiology. Also with new lower back pain, but unclear if this is worse post-LP. Fever curve improving and mental status now has returned to baseline. No risk factors for fungal cns infection including animal exposures, travel, immunocompromised status. Encephalitis panel negative. HIV neg. EEG w/o seizures. RIP neg.     Recommendations:  --ok to continue ampicillin, vanc, ceftriaxone pending csf cultures. Anticipate stopping if cultures finalize as negative.  --ok to continue acyclovir pending HSV and VZV pcr   --f/u HSV PCR, VZV PCR, west nile, VDRL, csf cultures  --if febrile repeat bcx, consider MRI brain and L spine

## 2024-06-10 NOTE — ASSESSMENT & PLAN NOTE
Urology recommended no acute intervention  Muhammad catheter placed and we will monitor urine output.

## 2024-06-10 NOTE — PROGRESS NOTES
Vancomycin consult follow-up:    Patient reviewed, renal function stable, no new levels, continue current therapy; Next levels due: trough due 6/11/2024 at 1630

## 2024-06-10 NOTE — ASSESSMENT & PLAN NOTE
Chronic, controlled. Latest blood pressure and vitals reviewed-     Temp:  [97.9 °F (36.6 °C)-98.9 °F (37.2 °C)]   Pulse:  []   Resp:  [16-23]   BP: (120-179)/()   SpO2:  [95 %-100 %] .   Home meds for hypertension were reviewed and noted below.   Hypertension Medications               lisinopriL (PRINIVIL,ZESTRIL) 20 MG tablet Take 20 mg by mouth 2 (two) times a day.    metoprolol succinate (TOPROL-XL) 200 MG 24 hr tablet Take 200 mg by mouth once daily.            While in the hospital, will manage blood pressure as follows; Continue home antihypertensive regimen    Will utilize p.r.n. blood pressure medication only if patient's blood pressure greater than 180/110 and she develops symptoms such as worsening chest pain or shortness of breath.

## 2024-06-10 NOTE — ASSESSMENT & PLAN NOTE
Patient's FSGs are controlled on current medication regimen.  Last A1c reviewed-   Lab Results   Component Value Date    HGBA1C 8.0 (H) 06/08/2024     Most recent fingerstick glucose reviewed-   Recent Labs   Lab 06/09/24  1655 06/09/24  1949 06/10/24  0733 06/10/24  1114   POCTGLUCOSE 226* 248* 241* 200*       Current correctional scale  Low  Maintain anti-hyperglycemic dose as follows-   Antihyperglycemics (From admission, onward)      Start     Stop Route Frequency Ordered    06/10/24 2100  insulin detemir U-100 (Levemir) pen 6 Units         -- SubQ 2 times daily 06/10/24 1234    06/09/24 1645  insulin aspart U-100 pen 5 Units         -- SubQ 3 times daily with meals 06/09/24 1209    06/09/24 1308  insulin aspart U-100 pen 1-10 Units         -- SubQ Before meals & nightly PRN 06/09/24 1209          Hold Oral hypoglycemics while patient is in the hospital.  A1c 8.  Monitor blood glucose on Decadron

## 2024-06-10 NOTE — NURSING
Handoff report received from morning shift RN, patient is awake and alert, on room air, no distress noted, bed on lowest position, wheels locked, call light in reach. Instructed to call for assistance.

## 2024-06-10 NOTE — PROGRESS NOTES
[facility]  Progress Note    Patient Name: Sussy Fulton  MRN: [id number]  Patient Class: IP- Inpatient   Admission Date: 6/7/2024  Length of Stay: 3 days  Attending Physician: Sofia Blanchard,*  Primary Care Provider: Arlen Rivera MD        Subjective:     Principal Problem:Meningoencephalitis        HPI:  79 y.o.  female with a past medical history significant for GERD, hypertension, hyperlipidemia and non-insulin-dependent type 2 diabetes presents to the ER with family due to weakness x1 day and not feeling well.  Patient denies any nausea any vomiting any diarrhea.  Patient denies any shortness breath or cough.  States that she started feeling bad after eating Chinese food with her family last night.  No one else in the family is feeling sick.  While she was in the ER was noted to have a temperature of a 103°.  Labs noted for white count of 11.8.  Lactic was normal.  Troponins were negative.  Urine was negative for any UTI.  COVID was negative chemistry was overall unremarkable.  Cultures were sent and she was started on vanc and Zosyn.  We were consulted for further evaluation and management of fever of unknown origin.  CT head was done and also negative for any mass or bleed.    On exam patient was noted to be tender on her right flank.  CT of the abdomen and pelvis with IV contrast was ordered for possible stone versus pyelo versus abscess.  CT of the abdomen and pelvis noted: Impression: Bilateral hydroureteronephrosis and moderate urinary bladder distension.  No nephrolithiasis.  Findings may be related to obstructive uropathy. Hepatic steatosis. Cholelithiasis and or a small gallbladder sludge.Small hiatal hernia. Consult urology was placed.  I contacted Urology to let them know the consulted to discuss possibilities of an infected stone.  Urology reviewed the report with me and no stone was mentioned.  Recommended Muhammad placement and continued  IV antibiotics.  We will follow up with her.  Culture sent from the Muhammad placed and pending.      Overview/Hospital Course:  79-year-old  female with past medical history of hypertension, hyperlipidemia, diabetes, GERD who was admitted for Sepsis likely secondary to meningoencephalitis.  Acute metabolic encephalopathy.  CT with b/l hydronephrosis.  Patient was initiated on empiric therapy for meningitis given stiff neck and AMS and moved to ICU.  ID and Neurology were consulted.  status post IR guided LP with 560 WBC and 75% lymphocytes - concerning for viral or fungal. Elevated Protein 223. On Amphotericin B, vanc, ceftriaxone, ampicillin, acylocivir and dexamethasone. Awaiting further culture data.  DC amphotericin per ID recs.  Mental status improving.  MRI pending.  EEG revealed diffuse slowing suggestive of mild diffuse cerebral dysfunction. No epileptiform activity or electrographic seizures seen.    Interval History:  Patient's mentation is improving.  Patient reports feeling much better.  Restart home blood pressure medications.  Pending MRI.  CSF cultures pending  Review of Systems   Constitutional: Negative.    Respiratory: Negative.     Cardiovascular: Negative.    Gastrointestinal: Negative.    Genitourinary: Negative.    Musculoskeletal: Negative.    Neurological:  Positive for weakness.     Objective:     Vital Signs (Most Recent):  Temp: 98.5 °F (36.9 °C) (06/10/24 1116)  Pulse: 79 (06/10/24 1116)  Resp: 18 (06/10/24 1116)  BP: (!) 179/119 (06/10/24 1116)  SpO2: 100 % (06/10/24 1116) Vital Signs (24h Range):  Temp:  [97.9 °F (36.6 °C)-98.9 °F (37.2 °C)] 98.5 °F (36.9 °C)  Pulse:  [] 79  Resp:  [16-23] 18  SpO2:  [95 %-100 %] 100 %  BP: (120-179)/() 179/119     Weight: 79 kg (174 lb 2.6 oz)  Body mass index is 28.98 kg/m².    Intake/Output Summary (Last 24 hours) at 6/10/2024 1234  Last data filed at 6/9/2024 2141  Gross per 24 hour   Intake 134.95 ml   Output 30 ml   Net  104.95 ml         Physical Exam  Constitutional:       General: She is not in acute distress.     Appearance: She is normal weight.   Cardiovascular:      Rate and Rhythm: Normal rate.      Pulses: Normal pulses.   Pulmonary:      Effort: No respiratory distress.      Breath sounds: Normal breath sounds. No wheezing.   Abdominal:      General: Bowel sounds are normal. There is no distension.      Palpations: Abdomen is soft.      Tenderness: There is no abdominal tenderness.   Musculoskeletal:      Right lower leg: No edema.      Left lower leg: No edema.   Skin:     General: Skin is warm.   Neurological:      Mental Status: She is alert and oriented to person, place, and time. Mental status is at baseline.      Motor: No weakness.   Psychiatric:         Mood and Affect: Mood normal.             Significant Labs: All pertinent labs within the past 24 hours have been reviewed.    Significant Imaging: I have reviewed all pertinent imaging results/findings within the past 24 hours.        Assessment/Plan:      * Meningoencephalitis  - Sepsis with b/l hydronephrosis, unclear origin of infection as urine is clean. No skin cellulitis or wounds. No oral/dental infx. No PNA. IV abx and robison placed. 103F despite V+Z, Stiff neck, shaking, Alterned mental status, Alert but not following any   commands, not tracking staring blank. Neurogenic bladder w Hydroneph- no bacteria or WBC in urine.     - Starting meningitis antibiotics. Meningitis meds started. Move to ICU. IR consult for LP. EEG ordered. ID and Neuro consult.   - LP performed on 6/8 --> Cell count/diff with 560 WBC, predominantly lymphocytes with elevated protein and glucose  - on broad spectrum meningitis antibiotics while cultures/labs return  - mental status is improving with current regimen  - appreciate ID recommendations  - EEG  suggestive of mild diffuse cerebral dysfunction. No epileptiform activity or electrographic seizures  seen.      Hydroureteronephrosis  Urology recommended no acute intervention  Muhammad catheter placed and we will monitor urine output.      Fever, unknown origin  - suspect due to meningitis  -id on board.  Fever curve improved      HLD (hyperlipidemia)  Resume statin.      DM (diabetes mellitus)  Patient's FSGs are controlled on current medication regimen.  Last A1c reviewed-   Lab Results   Component Value Date    HGBA1C 8.0 (H) 06/08/2024     Most recent fingerstick glucose reviewed-   Recent Labs   Lab 06/09/24  1655 06/09/24  1949 06/10/24  0733 06/10/24  1114   POCTGLUCOSE 226* 248* 241* 200*       Current correctional scale  Low  Maintain anti-hyperglycemic dose as follows-   Antihyperglycemics (From admission, onward)      Start     Stop Route Frequency Ordered    06/10/24 2100  insulin detemir U-100 (Levemir) pen 6 Units         -- SubQ 2 times daily 06/10/24 1234    06/09/24 1645  insulin aspart U-100 pen 5 Units         -- SubQ 3 times daily with meals 06/09/24 1209    06/09/24 1308  insulin aspart U-100 pen 1-10 Units         -- SubQ Before meals & nightly PRN 06/09/24 1209          Hold Oral hypoglycemics while patient is in the hospital.  A1c 8.  Monitor blood glucose on Decadron    HTN (hypertension)  Chronic, controlled. Latest blood pressure and vitals reviewed-     Temp:  [97.9 °F (36.6 °C)-98.9 °F (37.2 °C)]   Pulse:  []   Resp:  [16-23]   BP: (120-179)/()   SpO2:  [95 %-100 %] .   Home meds for hypertension were reviewed and noted below.   Hypertension Medications               lisinopriL (PRINIVIL,ZESTRIL) 20 MG tablet Take 20 mg by mouth 2 (two) times a day.    metoprolol succinate (TOPROL-XL) 200 MG 24 hr tablet Take 200 mg by mouth once daily.            While in the hospital, will manage blood pressure as follows; Continue home antihypertensive regimen    Will utilize p.r.n. blood pressure medication only if patient's blood pressure greater than 180/110 and she develops symptoms  such as worsening chest pain or shortness of breath.      VTE Risk Mitigation (From admission, onward)           Ordered     enoxaparin injection 40 mg  Daily         06/07/24 1857     IP VTE HIGH RISK PATIENT  Once         06/07/24 1857     Place sequential compression device  Until discontinued         06/07/24 1857                    Discharge Planning   PHYLICIA:      Code Status: Full Code   Is the patient medically ready for discharge?:     Reason for patient still in hospital (select all that apply): Patient trending condition and Treatment  Discharge Plan A: Home with family                  Sofia Blanchard MD  Department of Hospital Medicine   [facility]

## 2024-06-10 NOTE — SUBJECTIVE & OBJECTIVE
"Interval History: family at bedside states she is improved from admission, but still intermittently confused. Discussed days prior to presentation. Had URI like symptoms that resolved followed by fatigue and "not feeling well".  stated that she was behaving differently. Her sinus symptoms are gone. Had an episode of emesis previously that has resolved. Still has headache and eye pain, but improved. Has right shoulder pain.     Review of Systems   Constitutional:  Positive for activity change, appetite change, fatigue and fever.   HENT:  Positive for sore throat.    Eyes:  Positive for pain and visual disturbance.   Respiratory: Negative.     Cardiovascular:  Positive for chest pain.   Gastrointestinal:  Positive for abdominal pain, constipation and nausea.   Endocrine: Negative.    Genitourinary:  Positive for dysuria.   Musculoskeletal:  Positive for back pain.   Skin:  Negative for rash and wound.   Allergic/Immunologic: Negative.    Neurological:  Positive for speech difficulty and weakness.   Hematological: Negative.    Psychiatric/Behavioral:  Positive for confusion.      Objective:     Vital Signs (Most Recent):  Temp: 98.5 °F (36.9 °C) (06/10/24 1116)  Pulse: 79 (06/10/24 1116)  Resp: 18 (06/10/24 1116)  BP: (!) 179/119 (06/10/24 1116)  SpO2: 100 % (06/10/24 1116) Vital Signs (24h Range):  Temp:  [97.9 °F (36.6 °C)-98.9 °F (37.2 °C)] 98.5 °F (36.9 °C)  Pulse:  [] 79  Resp:  [16-23] 18  SpO2:  [95 %-100 %] 100 %  BP: (120-179)/() 179/119     Weight: 79 kg (174 lb 2.6 oz)  Body mass index is 28.98 kg/m².    Estimated Creatinine Clearance: 40.8 mL/min (based on SCr of 1.2 mg/dL).     Physical Exam  Vitals and nursing note reviewed.   Constitutional:       Appearance: Normal appearance. She is not ill-appearing.   HENT:      Head: Normocephalic.      Mouth/Throat:      Mouth: Mucous membranes are moist.      Pharynx: Oropharynx is clear. No oropharyngeal exudate or posterior oropharyngeal " erythema.   Eyes:      General: No scleral icterus.        Right eye: No discharge.         Left eye: No discharge.   Pulmonary:      Effort: Pulmonary effort is normal. No respiratory distress.   Abdominal:      Palpations: Abdomen is soft.      Tenderness: There is abdominal tenderness (RUQ).   Musculoskeletal:         General: Tenderness (Right shoulder) present.   Skin:     General: Skin is warm and dry.      Findings: No rash.   Neurological:      General: No focal deficit present.      Mental Status: She is alert and oriented to person, place, and time.   Psychiatric:         Mood and Affect: Mood normal.         Behavior: Behavior normal.          Significant Labs:   Microbiology Results (last 7 days)       Procedure Component Value Units Date/Time    CSF culture [[id number]] Collected: 06/08/24 1247    Order Status: Completed Specimen: CSF (Spinal Fluid) from CSF Tap, Tube 1 Updated: 06/10/24 0734     CSF CULTURE No Growth to date     Gram Stain Result Cytospin indicates:      Many WBC's      No organisms seen    Blood culture [[id number]] Collected: 06/08/24 0542    Order Status: Completed Specimen: Blood from Peripheral, Hand, Right Updated: 06/10/24 0703     Blood Culture, Routine No Growth to date      No Growth to date      No Growth to date    Cryptococcal antigen, CSF [[id number]] Collected: 06/08/24 1247    Order Status: Completed Specimen: CSF (Spinal Fluid) from CSF Tap, Tube 1 Updated: 06/09/24 1722     Crypto Ag, CSF Negative    Blood culture x two cultures. Draw prior to antibiotics. [[id number]] Collected: 06/07/24 1124    Order Status: Completed Specimen: Blood from Peripheral, Antecubital, Left Updated: 06/09/24 1303     Blood Culture, Routine No Growth to date      No Growth to date      No Growth to date    Narrative:      Aerobic and anaerobic    Blood culture x two cultures. Draw prior to antibiotics. [[id number]] Collected: 06/07/24 1138    Order Status: Completed Specimen: Blood from  Peripheral, Upper Arm, Right Updated: 06/09/24 1303     Blood Culture, Routine No Growth to date      No Growth to date      No Growth to date    Narrative:      Aerobic and anaerobic    Respiratory Infection Panel (PCR), Nasopharyngeal [[id number]] Collected: 06/07/24 2222    Order Status: Completed Specimen: Nasopharyngeal Swab Updated: 06/08/24 2058     Respiratory Infection Panel Source NP Swab     Adenovirus Not Detected     Coronavirus 229E, Common Cold Virus Not Detected     Coronavirus HKU1, Common Cold Virus Not Detected     Coronavirus NL63, Common Cold Virus Not Detected     Coronavirus OC43, Common Cold Virus Not Detected     Comment: The Coronavirus strains detected in this test cause the common cold.  These strains are not the COVID-19 (novel Coronavirus)strain   associated with the respiratory disease outbreak.          SARS-CoV2 (COVID-19) Qualitative PCR Not Detected     Human Metapneumovirus Not Detected     Human Rhinovirus/Enterovirus Not Detected     Influenza A (subtypes H1, H1-2009,H3) Not Detected     Influenza B Not Detected     Parainfluenza Virus 1 Not Detected     Parainfluenza Virus 2 Not Detected     Parainfluenza Virus 3 Not Detected     Parainfluenza Virus 4 Not Detected     Respiratory Syncytial Virus Not Detected     Bordetella Parapertussis (DY3829) Not Detected     Bordetella pertussis (ptxP) Not Detected     Chlamydia pneumoniae Not Detected     Mycoplasma pneumoniae Not Detected    Narrative:      Assay not valid for lower respiratory specimens, alternate  testing required.    Fungus culture [[id number]] Collected: 06/08/24 1247    Order Status: Sent Specimen: CSF (Spinal Fluid) from CSF Tap, Tube 1 Updated: 06/08/24 1312    Gram stain [[id number]] Collected: 06/08/24 1247    Order Status: Canceled Specimen: CSF (Spinal Fluid) from CSF Tap, Tube 1     Respiratory Infection Panel (PCR), Nasopharyngeal [[id number]]     Order Status: Canceled Specimen: Nasopharyngeal Swab      Culture, Anaerobe [[id number]]     Order Status: Canceled Specimen: CSF (Spinal Fluid)     Aerobic culture [[id number]]     Order Status: Canceled Specimen: CSF (Spinal Fluid)     Blood culture x two cultures. Draw prior to antibiotics. [[id number]]     Order Status: Canceled Specimen: Blood     Blood culture x two cultures. Draw prior to antibiotics. [[id number]]     Order Status: Canceled Specimen: Blood             Significant Imaging: I have reviewed all pertinent imaging results/findings within the past 24 hours.

## 2024-06-10 NOTE — NURSING
Received report from ANIRUDH Matute. Patient lying in bed resting, NAD noted. Safety Precautions maintained, Will Monitor.

## 2024-06-10 NOTE — PLAN OF CARE
Problem: Adult Inpatient Plan of Care  Goal: Plan of Care Review  Outcome: Not Progressing  Goal: Patient-Specific Goal (Individualized)  Outcome: Not Progressing  Goal: Absence of Hospital-Acquired Illness or Injury  Outcome: Not Progressing  Goal: Optimal Comfort and Wellbeing  Outcome: Not Progressing  Goal: Readiness for Transition of Care  Outcome: Not Progressing     Problem: Diabetes Comorbidity  Goal: Blood Glucose Level Within Targeted Range  Outcome: Not Progressing     Problem: Infection  Goal: Absence of Infection Signs and Symptoms  Outcome: Not Progressing     Problem: Skin Injury Risk Increased  Goal: Skin Health and Integrity  Outcome: Not Progressing     Problem: Fall Injury Risk  Goal: Absence of Fall and Fall-Related Injury  Outcome: Not Progressing

## 2024-06-10 NOTE — NURSING
[facility] [place] 4 [place]      6/9/2024       Skin assessed on: Q Shift      [x] No Pressure Injuries Present    [x]Prevention Measures Documented    [] Yes LDA  for Pressure Injury Previously documented     [] Yes New Pressure Injury Discovered   [] LDA for New Pressure Injury Added      Attending RN:  Giovani Reyes RN     Second RN:  Bryanna Felder RN

## 2024-06-10 NOTE — NURSING
[facility], [facility] [place]  Nurses Note -- 4 Eyes      6/10/2024       Skin assessed on: Q Shift      [x] No Pressure Injuries Present    [x]Prevention Measures Documented    [] Yes LDA  for Pressure Injury Previously documented     [] Yes New Pressure Injury Discovered   [] LDA for New Pressure Injury Added      Attending RN:  Jossie Lee RN     Second RN:  ANIRUDH Matute

## 2024-06-10 NOTE — ASSESSMENT & PLAN NOTE
- Sepsis with b/l hydronephrosis, unclear origin of infection as urine is clean. No skin cellulitis or wounds. No oral/dental infx. No PNA. IV abx and robison placed. 103F despite V+Z, Stiff neck, shaking, Alterned mental status, Alert but not following any   commands, not tracking staring blank. Neurogenic bladder w Hydroneph- no bacteria or WBC in urine.     - Starting meningitis antibiotics. Meningitis meds started. Move to ICU. IR consult for LP. EEG ordered. ID and Neuro consult.   - LP performed on 6/8 --> Cell count/diff with 560 WBC, predominantly lymphocytes with elevated protein and glucose  - on broad spectrum meningitis antibiotics while cultures/labs return  - mental status is improving with current regimen  - appreciate ID recommendations  - EEG  suggestive of mild diffuse cerebral dysfunction. No epileptiform activity or electrographic seizures seen.

## 2024-06-11 LAB
ALBUMIN SERPL BCP-MCNC: 2.5 G/DL (ref 3.5–5.2)
ALP SERPL-CCNC: 46 U/L (ref 55–135)
ALT SERPL W/O P-5'-P-CCNC: 21 U/L (ref 10–44)
ANION GAP SERPL CALC-SCNC: 10 MMOL/L (ref 8–16)
AST SERPL-CCNC: 22 U/L (ref 10–40)
BACTERIA BLD CULT: NORMAL
BACTERIA BLD CULT: NORMAL
BASOPHILS # BLD AUTO: 0.01 K/UL (ref 0–0.2)
BASOPHILS NFR BLD: 0.1 % (ref 0–1.9)
BILIRUB SERPL-MCNC: 0.4 MG/DL (ref 0.1–1)
BUN SERPL-MCNC: 23 MG/DL (ref 8–23)
CALCIUM SERPL-MCNC: 7.7 MG/DL (ref 8.7–10.5)
CHLORIDE SERPL-SCNC: 105 MMOL/L (ref 95–110)
CO2 SERPL-SCNC: 21 MMOL/L (ref 23–29)
CREAT SERPL-MCNC: 1 MG/DL (ref 0.5–1.4)
DIFFERENTIAL METHOD BLD: ABNORMAL
EOSINOPHIL # BLD AUTO: 0 K/UL (ref 0–0.5)
EOSINOPHIL NFR BLD: 0 % (ref 0–8)
ERYTHROCYTE [DISTWIDTH] IN BLOOD BY AUTOMATED COUNT: 13.3 % (ref 11.5–14.5)
EST. GFR  (NO RACE VARIABLE): 58 ML/MIN/1.73 M^2
GLUCOSE SERPL-MCNC: 198 MG/DL (ref 70–110)
HCT VFR BLD AUTO: 36 % (ref 37–48.5)
HGB BLD-MCNC: 11.8 G/DL (ref 12–16)
HSV1, PCR, CSF: NEGATIVE
HSV2, PCR, CSF: NEGATIVE
IMM GRANULOCYTES # BLD AUTO: 0.1 K/UL (ref 0–0.04)
IMM GRANULOCYTES NFR BLD AUTO: 1.1 % (ref 0–0.5)
LYMPHOCYTES # BLD AUTO: 0.8 K/UL (ref 1–4.8)
LYMPHOCYTES NFR BLD: 8.5 % (ref 18–48)
MAGNESIUM SERPL-MCNC: 1.8 MG/DL (ref 1.6–2.6)
MCH RBC QN AUTO: 28.7 PG (ref 27–31)
MCHC RBC AUTO-ENTMCNC: 32.8 G/DL (ref 32–36)
MCV RBC AUTO: 88 FL (ref 82–98)
MONOCYTES # BLD AUTO: 0.5 K/UL (ref 0.3–1)
MONOCYTES NFR BLD: 4.8 % (ref 4–15)
NEUTROPHILS # BLD AUTO: 8 K/UL (ref 1.8–7.7)
NEUTROPHILS NFR BLD: 85.5 % (ref 38–73)
NRBC BLD-RTO: 0 /100 WBC
PHOSPHATE SERPL-MCNC: 2.3 MG/DL (ref 2.7–4.5)
PLATELET # BLD AUTO: 180 K/UL (ref 150–450)
PMV BLD AUTO: 10.9 FL (ref 9.2–12.9)
POCT GLUCOSE: 172 MG/DL (ref 70–110)
POCT GLUCOSE: 230 MG/DL (ref 70–110)
POCT GLUCOSE: 247 MG/DL (ref 70–110)
POTASSIUM SERPL-SCNC: 3.6 MMOL/L (ref 3.5–5.1)
PROT SERPL-MCNC: 6.2 G/DL (ref 6–8.4)
RBC # BLD AUTO: 4.11 M/UL (ref 4–5.4)
SODIUM SERPL-SCNC: 136 MMOL/L (ref 136–145)
VANCOMYCIN TROUGH SERPL-MCNC: 11.1 UG/ML (ref 10–22)
WBC # BLD AUTO: 9.36 K/UL (ref 3.9–12.7)

## 2024-06-11 PROCEDURE — 63600175 PHARM REV CODE 636 W HCPCS: Performed by: STUDENT IN AN ORGANIZED HEALTH CARE EDUCATION/TRAINING PROGRAM

## 2024-06-11 PROCEDURE — 25000003 PHARM REV CODE 250: Performed by: STUDENT IN AN ORGANIZED HEALTH CARE EDUCATION/TRAINING PROGRAM

## 2024-06-11 PROCEDURE — 97535 SELF CARE MNGMENT TRAINING: CPT

## 2024-06-11 PROCEDURE — 11000001 HC ACUTE MED/SURG PRIVATE ROOM

## 2024-06-11 PROCEDURE — 80202 ASSAY OF VANCOMYCIN: CPT | Performed by: STUDENT IN AN ORGANIZED HEALTH CARE EDUCATION/TRAINING PROGRAM

## 2024-06-11 PROCEDURE — 80053 COMPREHEN METABOLIC PANEL: CPT | Performed by: STUDENT IN AN ORGANIZED HEALTH CARE EDUCATION/TRAINING PROGRAM

## 2024-06-11 PROCEDURE — 25000003 PHARM REV CODE 250: Performed by: INTERNAL MEDICINE

## 2024-06-11 PROCEDURE — 27000207 HC ISOLATION

## 2024-06-11 PROCEDURE — 97161 PT EVAL LOW COMPLEX 20 MIN: CPT

## 2024-06-11 PROCEDURE — 83735 ASSAY OF MAGNESIUM: CPT | Performed by: STUDENT IN AN ORGANIZED HEALTH CARE EDUCATION/TRAINING PROGRAM

## 2024-06-11 PROCEDURE — 99233 SBSQ HOSP IP/OBS HIGH 50: CPT | Mod: ,,, | Performed by: STUDENT IN AN ORGANIZED HEALTH CARE EDUCATION/TRAINING PROGRAM

## 2024-06-11 PROCEDURE — A4216 STERILE WATER/SALINE, 10 ML: HCPCS | Performed by: STUDENT IN AN ORGANIZED HEALTH CARE EDUCATION/TRAINING PROGRAM

## 2024-06-11 PROCEDURE — 85025 COMPLETE CBC W/AUTO DIFF WBC: CPT | Performed by: STUDENT IN AN ORGANIZED HEALTH CARE EDUCATION/TRAINING PROGRAM

## 2024-06-11 PROCEDURE — 97165 OT EVAL LOW COMPLEX 30 MIN: CPT

## 2024-06-11 PROCEDURE — 36415 COLL VENOUS BLD VENIPUNCTURE: CPT | Performed by: STUDENT IN AN ORGANIZED HEALTH CARE EDUCATION/TRAINING PROGRAM

## 2024-06-11 PROCEDURE — 84100 ASSAY OF PHOSPHORUS: CPT | Performed by: STUDENT IN AN ORGANIZED HEALTH CARE EDUCATION/TRAINING PROGRAM

## 2024-06-11 RX ADMIN — INSULIN GLARGINE 6 UNITS: 100 INJECTION, SOLUTION SUBCUTANEOUS at 09:06

## 2024-06-11 RX ADMIN — INSULIN ASPART 4 UNITS: 100 INJECTION, SOLUTION INTRAVENOUS; SUBCUTANEOUS at 12:06

## 2024-06-11 RX ADMIN — Medication 10 ML: at 05:06

## 2024-06-11 RX ADMIN — LEVETIRACETAM 500 MG: 100 INJECTION, SOLUTION INTRAVENOUS at 08:06

## 2024-06-11 RX ADMIN — INSULIN ASPART 2 UNITS: 100 INJECTION, SOLUTION INTRAVENOUS; SUBCUTANEOUS at 04:06

## 2024-06-11 RX ADMIN — MUPIROCIN: 20 OINTMENT TOPICAL at 08:06

## 2024-06-11 RX ADMIN — METOPROLOL SUCCINATE 100 MG: 50 TABLET, EXTENDED RELEASE ORAL at 09:06

## 2024-06-11 RX ADMIN — INSULIN ASPART 5 UNITS: 100 INJECTION, SOLUTION INTRAVENOUS; SUBCUTANEOUS at 04:06

## 2024-06-11 RX ADMIN — INSULIN ASPART 4 UNITS: 100 INJECTION, SOLUTION INTRAVENOUS; SUBCUTANEOUS at 09:06

## 2024-06-11 RX ADMIN — ACETAMINOPHEN 650 MG: 325 TABLET ORAL at 12:06

## 2024-06-11 RX ADMIN — ACYCLOVIR SODIUM 570 MG: 50 INJECTION, SOLUTION INTRAVENOUS at 08:06

## 2024-06-11 RX ADMIN — DEXAMETHASONE SODIUM PHOSPHATE 10 MG: 4 INJECTION INTRA-ARTICULAR; INTRALESIONAL; INTRAMUSCULAR; INTRAVENOUS; SOFT TISSUE at 12:06

## 2024-06-11 RX ADMIN — AMPICILLIN SODIUM 2 G: 2 INJECTION, POWDER, FOR SOLUTION INTRAMUSCULAR; INTRAVENOUS at 09:06

## 2024-06-11 RX ADMIN — INSULIN ASPART 1 UNITS: 100 INJECTION, SOLUTION INTRAVENOUS; SUBCUTANEOUS at 10:06

## 2024-06-11 RX ADMIN — AMPICILLIN SODIUM 2 G: 2 INJECTION, POWDER, FOR SOLUTION INTRAMUSCULAR; INTRAVENOUS at 10:06

## 2024-06-11 RX ADMIN — INSULIN GLARGINE 6 UNITS: 100 INJECTION, SOLUTION SUBCUTANEOUS at 10:06

## 2024-06-11 RX ADMIN — OXYCODONE AND ACETAMINOPHEN 1 TABLET: 10; 325 TABLET ORAL at 10:06

## 2024-06-11 RX ADMIN — DEXAMETHASONE SODIUM PHOSPHATE 10 MG: 4 INJECTION INTRA-ARTICULAR; INTRALESIONAL; INTRAMUSCULAR; INTRAVENOUS; SOFT TISSUE at 05:06

## 2024-06-11 RX ADMIN — LISINOPRIL 20 MG: 20 TABLET ORAL at 09:06

## 2024-06-11 RX ADMIN — LEVETIRACETAM 500 MG: 100 INJECTION, SOLUTION INTRAVENOUS at 09:06

## 2024-06-11 RX ADMIN — VANCOMYCIN HYDROCHLORIDE 1250 MG: 1.25 INJECTION, POWDER, LYOPHILIZED, FOR SOLUTION INTRAVENOUS at 10:06

## 2024-06-11 RX ADMIN — AMPICILLIN SODIUM 2 G: 2 INJECTION, POWDER, FOR SOLUTION INTRAMUSCULAR; INTRAVENOUS at 04:06

## 2024-06-11 RX ADMIN — ACYCLOVIR SODIUM 570 MG: 50 INJECTION, SOLUTION INTRAVENOUS at 12:06

## 2024-06-11 RX ADMIN — Medication 10 ML: at 12:06

## 2024-06-11 RX ADMIN — INSULIN ASPART 5 UNITS: 100 INJECTION, SOLUTION INTRAVENOUS; SUBCUTANEOUS at 12:06

## 2024-06-11 RX ADMIN — ACETAMINOPHEN 650 MG: 325 TABLET ORAL at 04:06

## 2024-06-11 RX ADMIN — MUPIROCIN: 20 OINTMENT TOPICAL at 09:06

## 2024-06-11 RX ADMIN — ENOXAPARIN SODIUM 40 MG: 40 INJECTION SUBCUTANEOUS at 04:06

## 2024-06-11 RX ADMIN — AMPICILLIN SODIUM 2 G: 2 INJECTION, POWDER, FOR SOLUTION INTRAMUSCULAR; INTRAVENOUS at 03:06

## 2024-06-11 RX ADMIN — INSULIN ASPART 5 UNITS: 100 INJECTION, SOLUTION INTRAVENOUS; SUBCUTANEOUS at 09:06

## 2024-06-11 RX ADMIN — CETIRIZINE HYDROCHLORIDE 10 MG: 10 TABLET, FILM COATED ORAL at 09:06

## 2024-06-11 RX ADMIN — ACYCLOVIR SODIUM 570 MG: 50 INJECTION, SOLUTION INTRAVENOUS at 03:06

## 2024-06-11 RX ADMIN — CEFTRIAXONE 2 G: 2 INJECTION, POWDER, FOR SOLUTION INTRAMUSCULAR; INTRAVENOUS at 12:06

## 2024-06-11 NOTE — PLAN OF CARE
Pt is currently on isolation. Pt continues to need medical care. Pt's MRI is pending and PT has been consulted.      06/11/24 3369   Discharge Reassessment   Assessment Type Discharge Planning Reassessment   Did the patient's condition or plan change since previous assessment? Yes   Discharge Plan discussed with: Patient   Communicated PHYLICIA with patient/caregiver Yes   Discharge Plan A Home   Discharge Plan B Other  (TBD)   DME Needed Upon Discharge  none   Transition of Care Barriers None   Why the patient remains in the hospital Requires continued medical care   Post-Acute Status   Discharge Delays None known at this time

## 2024-06-11 NOTE — ASSESSMENT & PLAN NOTE
Patient's FSGs are controlled on current medication regimen.  Last A1c reviewed-   Lab Results   Component Value Date    HGBA1C 8.0 (H) 06/08/2024     Most recent fingerstick glucose reviewed-   Recent Labs   Lab 06/10/24  1753 06/10/24  2007 06/11/24  0835 06/11/24  1151   POCTGLUCOSE 224* 210* 230* 247*       Current correctional scale  Low  Maintain anti-hyperglycemic dose as follows-   Antihyperglycemics (From admission, onward)    Start     Stop Route Frequency Ordered    06/10/24 2100  insulin glargine U-100 (Lantus) pen 6 Units         -- SubQ 2 times daily 06/10/24 1243    06/09/24 1645  insulin aspart U-100 pen 5 Units         -- SubQ 3 times daily with meals 06/09/24 1209    06/09/24 1308  insulin aspart U-100 pen 1-10 Units         -- SubQ Before meals & nightly PRN 06/09/24 1209        Hold Oral hypoglycemics while patient is in the hospital.  A1c 8.  Monitor blood glucose on Decadron

## 2024-06-11 NOTE — ASSESSMENT & PLAN NOTE
- Sepsis with b/l hydronephrosis, unclear origin of infection as urine is clean. No skin cellulitis or wounds. No oral/dental infx. No PNA. IV abx and robison placed. 103F despite V+Z, Stiff neck, shaking, Alterned mental status, Alert but not following any   commands, not tracking staring blank. Neurogenic bladder w Hydroneph- no bacteria or WBC in urine.     - Starting meningitis antibiotics. Meningitis meds started. Move to ICU. IR consult for LP. EEG ordered. ID and Neuro consult.   - LP performed on 6/8 --> Cell count/diff with 560 WBC, predominantly lymphocytes with elevated protein and glucose  - on broad spectrum meningitis antibiotics while cultures/labs return  - mental status is improving with current regimen  - appreciate ID recommendations  - EEG  suggestive of mild diffuse cerebral dysfunction. No epileptiform activity or electrographic seizures seen.  -MRI revealed no acute intracranial abnormality.  Pending MRI L-spine.

## 2024-06-11 NOTE — PLAN OF CARE
Problem: Occupational Therapy  Goal: Occupational Therapy Goal  Description: Goals to be met by: 6/25/2024     Patient will increase functional independence with ADLs by performing:    UE Dressing with Modified [place].  LE Dressing with Modified [place].  Grooming while standing at sink with Modified [place] and Assistive Devices as needed.  Toileting from toilet with Modified [place] for hygiene and clothing management.   Supine to sit with Modified [place].  Step transfer with Modified [place]  Toilet transfer to toilet with Modified [place].  Upper extremity exercise program x10 reps per handout, with independence.    Outcome: Progressing

## 2024-06-11 NOTE — PT/OT/SLP EVAL
Occupational Therapy Evaluation and Treatment    Name: Sussy Fulton  MRN: [id number]  Admitting Diagnosis: Meningoencephalitis  Recent Surgery: * No surgery found *      Recommendations:     Discharge Recommendations: Low Intensity Therapy  Level of Assistance Recommended: Intermittent assistance  Discharge Equipment Recommendations: none  Barriers to discharge:      Assessment:     Sussy Fulton is a 79 y.o. female with a medical diagnosis of Meningoencephalitis. She presents with performance deficits affecting function including weakness, impaired endurance, impaired functional mobility, impaired self care skills, gait instability, impaired balance, decreased upper extremity function.     Rehab Prognosis: Good; patient would benefit from acute OT services to address these deficits and reach maximum level of function.    Plan:     Patient to be seen 2 x/week to address the above listed problems via self-care/home management, therapeutic activities, therapeutic exercises  Plan of Care Expires: 06/25/24  Plan of Care Reviewed with: patient, daughter    Subjective     Chief Complaint: feeling tired and it makes her feel lazy  Patient Comments/Goals: agreeable to OT  Pain/Comfort:  Pain Rating 1: 0/10    Patients cultural, spiritual, Mormonism conflicts given the current situation: no    Social History:  Living Environment: Patient lives with their spouse in  a split level house  with walk-in shower  Prior Level of Function: Prior to admission, patient was independent  Roles and Routines: Patient was driving prior to admission.drives her spouse to medical appointments  Equipment Used at Home: none  DME owned (not currently used): none  Assistance Upon Discharge: significant other and daughter    Objective:     Communicated with nurseAlison prior to session. Patient found left sidelying with telemetry upon OT entry to room.    General Precautions: Standard, droplet, fall   Orthopedic Precautions: N/A   Braces: N/A     Respiratory Status: Room air    Occupational Performance    Gait belt applied - Yes    Bed Mobility:   Supine to sit from left side of bed with stand by assistance    Functional Mobility/Transfers:  Sit <> Stand Transfer with stand by assistance and contact guard assistance with hand-held assist  Toilet Transfer Step Transfer technique with stand by assistance and contact guard assistance with no AD  Functional Mobility: The patient amb without AD to the toilet and sink with SBA/CGA and was able to manage the bathroom door    Activities of Daily Living:  Grooming: stand by assistance and contact guard assistance to stand at the sink to wash her hands  Upper Body Dressing: contact guard assistance  Toileting: set up assistance and stand by assistance on the toilet    Cognitive/Visual Perceptual:  Cognitive/Psychosocial Skills:    -     Oriented to: Person, Place, Time, Situation  -     Follows Commands/attention: Follows multistep  commands  -     Communication: clear/fluent  -     Memory: No Deficits noted  -     Safety awareness/insight to disability: intact  -     Mood/Affect/Coping skills/emotional control: Appropriate to situation  Visual/Perceptual:    -     Intact    Physical Exam:  Balance:    -     Sitting: supervision  -     Standing: stand by assistance and contact guard assistance  Postural examination/scapula alignment:    -       Forward head  Skin integrity: Visible skin intact  Edema:  None noted  Sensation:    -       Intact  Dominant hand: Right  Upper Extremity Range of Motion:     -       Right Upper Extremity: WFL  -       Left Upper Extremity: WFL  Upper Extremity Strength:    -       Right Upper Extremity: WFL  -       Left Upper Extremity: WFL   Strength:    -       Right Upper Extremity: WFL  -       Left Upper Extremity: WFL    AMPAC 6 Click ADL:  AMPAC Total Score: 19    Treatment & Education:  Patient educated on role of OT, POC, and goals for therapy  Performed self care and  functional mobility as noted above  Educated the patient re: benefits of sitting in the chair throughout the day  Educated te patient re: need to call the nurse to return to bed        Patient left up in chair with all lines intact, call button in reach, RN notified, and family present.    GOALS:   Multidisciplinary Problems       Occupational Therapy Goals          Problem: Occupational Therapy    Goal Priority Disciplines Outcome Interventions   Occupational Therapy Goal     OT, PT/OT Progressing    Description: Goals to be met by: 6/25/2024     Patient will increase functional independence with ADLs by performing:    UE Dressing with Modified [place].  LE Dressing with Modified [place].  Grooming while standing at sink with Modified [place] and Assistive Devices as needed.  Toileting from toilet with Modified [place] for hygiene and clothing management.   Supine to sit with Modified [place].  Step transfer with Modified [place]  Toilet transfer to toilet with Modified [place].  Upper extremity exercise program x10 reps per handout, with independence.                         History:     Past Medical History:   Diagnosis Date    Diabetes mellitus     Hypertension        No past surgical history on file.    Time Tracking:     OT Date of Treatment: 06/11/24  OT Start Time: 1404  OT Stop Time: 1431  OT Total Time (min): 27 min    Billable Minutes: Evaluation 15 (with PT) and Self Care/Home Management 12    6/11/2024

## 2024-06-11 NOTE — PT/OT/SLP EVAL
Physical Therapy Evaluation and Discharge Note    Patient Name:  Susys Fulton   MRN:  [id number]    Recommendations:     Discharge Recommendations: Low Intensity Therapy (close supervision/assist PRN, ok for OOB to chair and ambualte to bathroom with nufsing staff.)  Discharge Equipment Recommendations: none   Barriers to discharge:  None from PT standpoint    Assessment:     Sussy Fulton is a 79 y.o. female admitted with a medical diagnosis of Meningoencephalitis. .  At this time, patient is functioning at their prior level of function and does not require further acute PT services.     Recent Surgery: * No surgery found *      Plan:     During this hospitalization, patient does not require further acute PT services.  Please re-consult if situation changes.      Subjective     Chief Complaint: lethargy  Patient/Family Comments/goals: Pt agreeable to evaluation and to remain up in chair following  Pain/Comfort:  Pain Rating 1:  (not rated, R shoulder)  Pain Addressed 1: Reposition, Distraction, Cessation of Activity    Patients cultural, spiritual, Restorationist conflicts given the current situation: no    Living Environment:  Pt lives with her spouse and son in a [facility] with no concerns  Prior to admission, patients level of function was Independent with ambulation an ADL's.  Equipment used at home: none.  DME owned (not currently used): none.  Upon discharge, patient will have assistance from Son.    Objective:     Communicated with nsg prior to session.  Patient found HOB elevated with telemetry upon PT entry to room.    General Precautions: Standard, fall, droplet    Orthopedic Precautions:N/A   Braces: N/A  Respiratory Status: Room air    Exams:  Cognitive Exam:  Patient is oriented to Person, Place, and Time  Gross Motor Coordination:  WFL  Postural Exam:  Patient presented with the following abnormalities:    -       Rounded shoulders  -       Forward head  Sensation:    -       Intact  light/touch B LE's  Skin  Integrity/Edema:      -       Skin integrity: Visible skin intact  -       Edema: None noted    RLE ROM: WFL  RLE Strength: WFL  LLE ROM: WFL  LLE Strength: WFL    Functional Mobility:  Bed Mobility:     Scooting: modified independence  Supine to Sit: modified independence  Transfers:     Sit to Stand:  supervision with no AD  Gait: SBA approx 18'  Balance: Good-    AM-PAC 6 CLICK MOBILITY  Total Score:20       Treatment and Education:  Handout provided to and reviewed with pt for AP's, TKE's, GS, Hip ABD, FAQ's, and AP's to be performed 10x each, 3-5x/day.     AM-PAC 6 CLICK MOBILITY  Total Score:20     Patient left up in chair with all lines intact, call button in reach, and OT and family present.    GOALS:   Multidisciplinary Problems       Physical Therapy Goals       Not on file                    History:     Past Medical History:   Diagnosis Date    Diabetes mellitus     Hypertension        No past surgical history on file.    Time Tracking:     PT Received On: 06/11/24  PT Start Time: 1415     PT Stop Time: 1430  PT Total Time (min): 15 min     Billable Minutes: Evaluation 15 co-evaluation with OT      06/11/2024

## 2024-06-11 NOTE — NURSING
[facility], [facility]  Nurses Note -- 4 Eyes      6/11/2024       Skin assessed on: Q Shift      [x] No Pressure Injuries Present    [x]Prevention Measures Documented    [] Yes LDA  for Pressure Injury Previously documented     [] Yes New Pressure Injury Discovered   [] LDA for New Pressure Injury Added      Attending RN:  Marquita Lima RN     Second RN:  KARLENE Mcdonnell

## 2024-06-11 NOTE — SUBJECTIVE & OBJECTIVE
Interval History: continues to have headache. Family at bedside concerned about being discharged too quickly. Was able to get up with assistance and wash. No fevers. Denies neck pain. Was able to eat some breakfast.     Review of Systems   Constitutional:  Positive for appetite change and fatigue. Negative for fever.   Eyes:  Positive for photophobia.   Neurological:  Positive for headaches.     Objective:     Vital Signs (Most Recent):  Temp: 98.3 °F (36.8 °C) (06/11/24 1150)  Pulse: 93 (06/11/24 1150)  Resp: 18 (06/11/24 1150)  BP: (!) 146/67 (06/11/24 1150)  SpO2: 100 % (06/11/24 1150) Vital Signs (24h Range):  Temp:  [98.3 °F (36.8 °C)-100 °F (37.8 °C)] 98.3 °F (36.8 °C)  Pulse:  [] 93  Resp:  [18-20] 18  SpO2:  [96 %-100 %] 100 %  BP: (131-187)/(67-98) 146/67     Weight: 79 kg (174 lb 2.6 oz)  Body mass index is 28.98 kg/m².    Estimated Creatinine Clearance: 48.9 mL/min (based on SCr of 1 mg/dL).     Physical Exam  Vitals and nursing note reviewed.   Constitutional:       Appearance: She is ill-appearing. She is not toxic-appearing.   HENT:      Head: Normocephalic.   Pulmonary:      Effort: Pulmonary effort is normal. No respiratory distress.   Abdominal:      General: There is no distension.      Palpations: Abdomen is soft.   Musculoskeletal:      Cervical back: Normal range of motion. No rigidity.   Neurological:      Mental Status: She is alert.   Psychiatric:         Mood and Affect: Mood normal.         Behavior: Behavior normal.          Significant Labs:   Microbiology Results (last 7 days)       Procedure Component Value Units Date/Time    Blood culture x two cultures. Draw prior to antibiotics. [[id number]] Collected: 06/07/24 1124    Order Status: Completed Specimen: Blood from Peripheral, Antecubital, Left Updated: 06/11/24 1303     Blood Culture, Routine No Growth after 4 days.    Narrative:      Aerobic and anaerobic    Blood culture x two cultures. Draw prior to antibiotics. [[id number]]  Collected: 06/07/24 1138    Order Status: Completed Specimen: Blood from Peripheral, Upper Arm, Right Updated: 06/11/24 1303     Blood Culture, Routine No Growth after 4 days.    Narrative:      Aerobic and anaerobic    CSF culture [[id number]] Collected: 06/08/24 1247    Order Status: Completed Specimen: CSF (Spinal Fluid) from CSF Tap, Tube 1 Updated: 06/11/24 0722     CSF CULTURE No Growth to date     Gram Stain Result Cytospin indicates:      Many WBC's      No organisms seen    Blood culture [[id number]] Collected: 06/08/24 0542    Order Status: Completed Specimen: Blood from Peripheral, Hand, Right Updated: 06/11/24 0703     Blood Culture, Routine No Growth to date      No Growth to date      No Growth to date      No Growth to date    Cryptococcal antigen, CSF [[id number]] Collected: 06/08/24 1247    Order Status: Completed Specimen: CSF (Spinal Fluid) from CSF Tap, Tube 1 Updated: 06/09/24 1722     Crypto Ag, CSF Negative    Respiratory Infection Panel (PCR), Nasopharyngeal [[id number]] Collected: 06/07/24 2222    Order Status: Completed Specimen: Nasopharyngeal Swab Updated: 06/08/24 2058     Respiratory Infection Panel Source NP Swab     Adenovirus Not Detected     Coronavirus 229E, Common Cold Virus Not Detected     Coronavirus HKU1, Common Cold Virus Not Detected     Coronavirus NL63, Common Cold Virus Not Detected     Coronavirus OC43, Common Cold Virus Not Detected     Comment: The Coronavirus strains detected in this test cause the common cold.  These strains are not the COVID-19 (novel Coronavirus)strain   associated with the respiratory disease outbreak.          SARS-CoV2 (COVID-19) Qualitative PCR Not Detected     Human Metapneumovirus Not Detected     Human Rhinovirus/Enterovirus Not Detected     Influenza A (subtypes H1, H1-2009,H3) Not Detected     Influenza B Not Detected     Parainfluenza Virus 1 Not Detected     Parainfluenza Virus 2 Not Detected     Parainfluenza Virus 3 Not Detected      Parainfluenza Virus 4 Not Detected     Respiratory Syncytial Virus Not Detected     Bordetella Parapertussis (KB4179) Not Detected     Bordetella pertussis (ptxP) Not Detected     Chlamydia pneumoniae Not Detected     Mycoplasma pneumoniae Not Detected    Narrative:      Assay not valid for lower respiratory specimens, alternate  testing required.    Fungus culture [[id number]] Collected: 06/08/24 1247    Order Status: Sent Specimen: CSF (Spinal Fluid) from CSF Tap, Tube 1 Updated: 06/08/24 1312    Gram stain [[id number]] Collected: 06/08/24 1247    Order Status: Canceled Specimen: CSF (Spinal Fluid) from CSF Tap, Tube 1     Respiratory Infection Panel (PCR), Nasopharyngeal [[id number]]     Order Status: Canceled Specimen: Nasopharyngeal Swab     Culture, Anaerobe [[id number]]     Order Status: Canceled Specimen: CSF (Spinal Fluid)     Aerobic culture [[id number]]     Order Status: Canceled Specimen: CSF (Spinal Fluid)     Blood culture x two cultures. Draw prior to antibiotics. [[id number]]     Order Status: Canceled Specimen: Blood     Blood culture x two cultures. Draw prior to antibiotics. [[id number]]     Order Status: Canceled Specimen: Blood             Significant Imaging: I have reviewed all pertinent imaging results/findings within the past 24 hours.

## 2024-06-11 NOTE — PROGRESS NOTES
[facility] Medicine  Progress Note    Patient Name: Sussy Fulton  MRN: [id number]  Patient Class: IP- Inpatient   Admission Date: 6/7/2024  Length of Stay: 4 days  Attending Physician: Sofia Blanchard,*  Primary Care Provider: Arlen Rivera MD        Subjective:     Principal Problem:Meningoencephalitis        HPI:  79 y.o.  female with a past medical history significant for GERD, hypertension, hyperlipidemia and non-insulin-dependent type 2 diabetes presents to the ER with family due to weakness x1 day and not feeling well.  Patient denies any nausea any vomiting any diarrhea.  Patient denies any shortness breath or cough.  States that she started feeling bad after eating Chinese food with her family last night.  No one else in the family is feeling sick.  While she was in the ER was noted to have a temperature of a 103°.  Labs noted for white count of 11.8.  Lactic was normal.  Troponins were negative.  Urine was negative for any UTI.  COVID was negative chemistry was overall unremarkable.  Cultures were sent and she was started on vanc and Zosyn.  We were consulted for further evaluation and management of fever of unknown origin.  CT head was done and also negative for any mass or bleed.    On exam patient was noted to be tender on her right flank.  CT of the abdomen and pelvis with IV contrast was ordered for possible stone versus pyelo versus abscess.  CT of the abdomen and pelvis noted: Impression: Bilateral hydroureteronephrosis and moderate urinary bladder distension.  No nephrolithiasis.  Findings may be related to obstructive uropathy. Hepatic steatosis. Cholelithiasis and or a small gallbladder sludge.Small hiatal hernia. Consult urology was placed.  I contacted Urology to let them know the consulted to discuss possibilities of an infected stone.  Urology reviewed the report with me and no stone was mentioned.  Recommended Muhammad placement and continued  IV antibiotics.  We will follow up with her.  Culture sent from the Muhammad placed and pending.      Overview/Hospital Course:  79-year-old  female with past medical history of hypertension, hyperlipidemia, diabetes, GERD who was admitted for Sepsis likely secondary to meningoencephalitis.  Acute metabolic encephalopathy.  CT with b/l hydronephrosis.  Patient was initiated on empiric therapy for meningitis given stiff neck and AMS and moved to ICU.  ID and Neurology were consulted.  status post IR guided LP with 560 WBC and 75% lymphocytes - concerning for viral or fungal. Elevated Protein 223. On Amphotericin B, vanc, ceftriaxone, ampicillin, acylocivir and dexamethasone. Awaiting further culture data.  DC amphotericin and Decadron per ID recs.  Mental status improving.  MRI negative  EEG revealed diffuse slowing suggestive of mild diffuse cerebral dysfunction. No epileptiform activity or electrographic seizures seen.    Interval History:  Mentation back to baseline.  T-max 100° F overnight.  Pending MRI L-spine.  PT/OT consulted        Review of Systems   Constitutional: Negative.    Respiratory: Negative.     Cardiovascular: Negative.    Gastrointestinal: Negative.    Genitourinary: Negative.    Musculoskeletal: Negative.    Neurological:  Positive for weakness.   Psychiatric/Behavioral: Negative.       Objective:     Vital Signs (Most Recent):  Temp: 98.3 °F (36.8 °C) (06/11/24 1150)  Pulse: 93 (06/11/24 1150)  Resp: 18 (06/11/24 1150)  BP: (!) 146/67 (06/11/24 1150)  SpO2: 100 % (06/11/24 1150) Vital Signs (24h Range):  Temp:  [98.3 °F (36.8 °C)-100 °F (37.8 °C)] 98.3 °F (36.8 °C)  Pulse:  [] 93  Resp:  [18-20] 18  SpO2:  [96 %-100 %] 100 %  BP: (131-187)/(67-98) 146/67     Weight: 79 kg (174 lb 2.6 oz)  Body mass index is 28.98 kg/m².    Intake/Output Summary (Last 24 hours) at 6/11/2024 1450  Last data filed at 6/11/2024 1206  Gross per 24 hour   Intake 360 ml   Output --   Net 360 ml          Physical Exam  Constitutional:       General: She is not in acute distress.     Appearance: She is normal weight.   Cardiovascular:      Rate and Rhythm: Normal rate.      Pulses: Normal pulses.   Pulmonary:      Effort: No respiratory distress.      Breath sounds: Normal breath sounds. No wheezing.   Abdominal:      General: Bowel sounds are normal. There is no distension.      Palpations: Abdomen is soft.      Tenderness: There is no abdominal tenderness.   Musculoskeletal:      Right lower leg: No edema.      Left lower leg: No edema.   Skin:     General: Skin is warm.   Neurological:      Mental Status: She is alert and oriented to person, place, and time. Mental status is at baseline.   Psychiatric:         Mood and Affect: Mood normal.             Significant Labs: All pertinent labs within the past 24 hours have been reviewed.    Significant Imaging: I have reviewed all pertinent imaging results/findings within the past 24 hours.        Assessment/Plan:      * Meningoencephalitis  - Sepsis with b/l hydronephrosis, unclear origin of infection as urine is clean. No skin cellulitis or wounds. No oral/dental infx. No PNA. IV abx and robison placed. 103F despite V+Z, Stiff neck, shaking, Alterned mental status, Alert but not following any   commands, not tracking staring blank. Neurogenic bladder w Hydroneph- no bacteria or WBC in urine.     - Starting meningitis antibiotics. Meningitis meds started. Move to ICU. IR consult for LP. EEG ordered. ID and Neuro consult.   - LP performed on 6/8 --> Cell count/diff with 560 WBC, predominantly lymphocytes with elevated protein and glucose  - on broad spectrum meningitis antibiotics while cultures/labs return  - mental status is improving with current regimen  - appreciate ID recommendations  - EEG  suggestive of mild diffuse cerebral dysfunction. No epileptiform activity or electrographic seizures seen.  -MRI revealed no acute intracranial abnormality.  Pending MRI  L-spine.      Hydroureteronephrosis  Urology recommended no acute intervention  Muhammad catheter placed and we will monitor urine output.      Fever, unknown origin  - suspect due to meningitis  -id on board.  Fever curve improved      HLD (hyperlipidemia)  Resume statin.      DM (diabetes mellitus)  Patient's FSGs are controlled on current medication regimen.  Last A1c reviewed-   Lab Results   Component Value Date    HGBA1C 8.0 (H) 06/08/2024     Most recent fingerstick glucose reviewed-   Recent Labs   Lab 06/10/24  1753 06/10/24  2007 06/11/24  0835 06/11/24  1151   POCTGLUCOSE 224* 210* 230* 247*       Current correctional scale  Low  Maintain anti-hyperglycemic dose as follows-   Antihyperglycemics (From admission, onward)      Start     Stop Route Frequency Ordered    06/10/24 2100  insulin glargine U-100 (Lantus) pen 6 Units         -- SubQ 2 times daily 06/10/24 1243    06/09/24 1645  insulin aspart U-100 pen 5 Units         -- SubQ 3 times daily with meals 06/09/24 1209    06/09/24 1308  insulin aspart U-100 pen 1-10 Units         -- SubQ Before meals & nightly PRN 06/09/24 1209          Hold Oral hypoglycemics while patient is in the hospital.  A1c 8.  Monitor blood glucose on Decadron    HTN (hypertension)  Chronic, controlled. Latest blood pressure and vitals reviewed-     Temp:  [97.9 °F (36.6 °C)-98.9 °F (37.2 °C)]   Pulse:  []   Resp:  [16-23]   BP: (120-179)/()   SpO2:  [95 %-100 %] .   Home meds for hypertension were reviewed and noted below.   Hypertension Medications               lisinopriL (PRINIVIL,ZESTRIL) 20 MG tablet Take 20 mg by mouth 2 (two) times a day.    metoprolol succinate (TOPROL-XL) 200 MG 24 hr tablet Take 200 mg by mouth once daily.            While in the hospital, will manage blood pressure as follows; Continue home antihypertensive regimen    Will utilize p.r.n. blood pressure medication only if patient's blood pressure greater than 180/110 and she develops symptoms  such as worsening chest pain or shortness of breath.      VTE Risk Mitigation (From admission, onward)           Ordered     enoxaparin injection 40 mg  Daily         06/07/24 1857     IP VTE HIGH RISK PATIENT  Once         06/07/24 1857     Place sequential compression device  Until discontinued         06/07/24 1857                    Discharge Planning   PHYLICIA:      Code Status: Full Code   Is the patient medically ready for discharge?:     Reason for patient still in hospital (select all that apply): Patient trending condition and Treatment  Discharge Plan A: Home with family                  Sofia Blanchard MD  Department of Hospital Medicine   [facility]

## 2024-06-11 NOTE — SUBJECTIVE & OBJECTIVE
Interval History:  Mentation back to baseline.  T-max 100° F overnight.  Pending MRI L-spine.  PT/OT consulted        Review of Systems   Constitutional: Negative.    Respiratory: Negative.     Cardiovascular: Negative.    Gastrointestinal: Negative.    Genitourinary: Negative.    Musculoskeletal: Negative.    Neurological:  Positive for weakness.   Psychiatric/Behavioral: Negative.       Objective:     Vital Signs (Most Recent):  Temp: 98.3 °F (36.8 °C) (06/11/24 1150)  Pulse: 93 (06/11/24 1150)  Resp: 18 (06/11/24 1150)  BP: (!) 146/67 (06/11/24 1150)  SpO2: 100 % (06/11/24 1150) Vital Signs (24h Range):  Temp:  [98.3 °F (36.8 °C)-100 °F (37.8 °C)] 98.3 °F (36.8 °C)  Pulse:  [] 93  Resp:  [18-20] 18  SpO2:  [96 %-100 %] 100 %  BP: (131-187)/(67-98) 146/67     Weight: 79 kg (174 lb 2.6 oz)  Body mass index is 28.98 kg/m².    Intake/Output Summary (Last 24 hours) at 6/11/2024 1450  Last data filed at 6/11/2024 1206  Gross per 24 hour   Intake 360 ml   Output --   Net 360 ml         Physical Exam  Constitutional:       General: She is not in acute distress.     Appearance: She is normal weight.   Cardiovascular:      Rate and Rhythm: Normal rate.      Pulses: Normal pulses.   Pulmonary:      Effort: No respiratory distress.      Breath sounds: Normal breath sounds. No wheezing.   Abdominal:      General: Bowel sounds are normal. There is no distension.      Palpations: Abdomen is soft.      Tenderness: There is no abdominal tenderness.   Musculoskeletal:      Right lower leg: No edema.      Left lower leg: No edema.   Skin:     General: Skin is warm.   Neurological:      Mental Status: She is alert and oriented to person, place, and time. Mental status is at baseline.   Psychiatric:         Mood and Affect: Mood normal.             Significant Labs: All pertinent labs within the past 24 hours have been reviewed.    Significant Imaging: I have reviewed all pertinent imaging results/findings within the past 24  hours.

## 2024-06-11 NOTE — PROGRESS NOTES
[facility]  [facility]fect[facility] Disease  Progress Note    Patient Name: Sussy Fulton  MRN: [id number]  Admission Date: 6/7/2024  Length of Stay: 4 days  Attending Physician: Sofia Blanchard,*  Primary Care Provider: Arlen Rivera MD    Isolation Status: Droplet  Assessment/Plan:      Other  Fever, unknown origin  80y/o F with weakness, malaise and fevers x 1 day. Source remains unclear. UA bland, no leukocytosis, CTH wnl, CT a/p with hydroureteronephrosis (no obstructing stones s/p robison) otherwise unremarkable. UA neg x 2. Noted to have neck rigidity and worsening encephalopathy concerning for meningitis. Broadened to vanc, ceftriaxone, ampicillin and acyclovir. LP revealed cloudy csf (wbc 500/ lymp / prtn 223/ gluc 100). Ampho started due to concern for fungal cns infection. Recent URI may be source of cns infection, LP results more consistent with viral etiology. Also with new lower back pain, but unclear if this is worse post-LP. Fever curve improving and mental status now has returned to baseline. No risk factors for fungal cns infection including animal exposures, travel, immunocompromised status. Encephalitis panel negative. HIV neg. EEG w/o seizures. RIP neg.     Recommendations:  - ok to continue ampicillin, vanc, ceftriaxone pending csf cultures. Anticipate stopping if cultures finalize as negative  - no evidence of strep pneumo meningitis and has received four days of dexamethasone, so would stop  - ok to continue acyclovir pending HSV and VZV pcr   - Will follow up HSV PCR, VZV PCR, west nile, VDRL, csf cultures  - would obtain L spine imaging given continued pain      Above discussed with primary team.     Time: 50 minutes   50% of time spent on face-to-face counseling and coordination of care. Counseling included review of test results, diagnosis, and treatment plan with patient and/or family.  I have reviewed hospital notes from  service and other specialty providers. I have also  "reviewed CBC, CMP/BMP,  cultures and imaging with my interpretation as documented. Patient is high risk of morbidity, on antibiotics requiring intensive monitoring for toxicity.       Anticipated Disposition: TBD    Thank you for your consult. I will follow-up with patient. Please contact us if you have any additional questions.    Marla Becerril MD  Infectious Disease  [facility] [place] [facility]    Subjective:     Principal Problem:Meningoencephalitis    HPI: 78y/o F pt w hx of HTN, HLD, T2DM  presented to the ER with family due to weakness x1 day and not feeling well and was admitted 6/7 for presumed sepsis with unclear source.    Pt denied nausea, vomiting, diarrhea, shortness breath or cough.  States that she started feeling bad after eating Chinese food with her family last night.  Denies sick contacts.      In the ED  pt was febrile to 103°.  Labs remarkable for wbc 11.8, LA wnl, UA bland,  COVID neg.  Started on empiric vanc and Zosyn. CT head unremarkable. CT of the abd/pel revealed: "Bilateral hydroureteronephrosis and moderate urinary bladder distension.  No nephrolithiasis.  Findings may be related to obstructive uropathy." Urology consulted and recommended robison placement and continued IV antibiotics.  Culture sent from the Robison placed and pending.    Hospital course c/b fevers despite bs abx and worsening acute encephalopathy prompting transfer to icu and broadening of abx to vacn, ceftriaxone, ampicillin and acyclovir for meningitis coverage.    ID consulted for: "103F despite V+Z, Stiff neck, shaking, Alterned mental status, Alert but not following any commands, not tracking staring blank. ?neuorgenic bladder w Hydroneph- no bacterio or WBC in urine "    Pt reports recent sinusitis 1 wk prior for which she received a steroid injection and albuterol. Notes new b/l lower back pain on day of presentation, but otherwise no new symptoms. Denies sick contacts, animal exposures, recent travel or recent " surgeries/ dental procedures. Does not work. Lives with her  who is a dialysis pt and her son. Denies having eaten any unpasteurized dairy products. Ate chinese food tues, but nobody else got sick.   Interval History: continues to have headache. Family at bedside concerned about being discharged too quickly. Was able to get up with assistance and wash. No fevers. Denies neck pain. Was able to eat some breakfast.     Review of Systems   Constitutional:  Positive for appetite change and fatigue. Negative for fever.   Eyes:  Positive for photophobia.   Neurological:  Positive for headaches.     Objective:     Vital Signs (Most Recent):  Temp: 98.3 °F (36.8 °C) (06/11/24 1150)  Pulse: 93 (06/11/24 1150)  Resp: 18 (06/11/24 1150)  BP: (!) 146/67 (06/11/24 1150)  SpO2: 100 % (06/11/24 1150) Vital Signs (24h Range):  Temp:  [98.3 °F (36.8 °C)-100 °F (37.8 °C)] 98.3 °F (36.8 °C)  Pulse:  [] 93  Resp:  [18-20] 18  SpO2:  [96 %-100 %] 100 %  BP: (131-187)/(67-98) 146/67     Weight: 79 kg (174 lb 2.6 oz)  Body mass index is 28.98 kg/m².    Estimated Creatinine Clearance: 48.9 mL/min (based on SCr of 1 mg/dL).     Physical Exam  Vitals and nursing note reviewed.   Constitutional:       Appearance: She is ill-appearing. She is not toxic-appearing.   HENT:      Head: Normocephalic.   Pulmonary:      Effort: Pulmonary effort is normal. No respiratory distress.   Abdominal:      General: There is no distension.      Palpations: Abdomen is soft.   Musculoskeletal:      Cervical back: Normal range of motion. No rigidity.   Neurological:      Mental Status: She is alert.   Psychiatric:         Mood and Affect: Mood normal.         Behavior: Behavior normal.          Significant Labs:   Microbiology Results (last 7 days)       Procedure Component Value Units Date/Time    Blood culture x two cultures. Draw prior to antibiotics. [[id number]] Collected: 06/07/24 1124    Order Status: Completed Specimen: Blood from Peripheral,  Antecubital, Left Updated: 06/11/24 1303     Blood Culture, Routine No Growth after 4 days.    Narrative:      Aerobic and anaerobic    Blood culture x two cultures. Draw prior to antibiotics. [[id number]] Collected: 06/07/24 1138    Order Status: Completed Specimen: Blood from Peripheral, Upper Arm, Right Updated: 06/11/24 1303     Blood Culture, Routine No Growth after 4 days.    Narrative:      Aerobic and anaerobic    CSF culture [[id number]] Collected: 06/08/24 1247    Order Status: Completed Specimen: CSF (Spinal Fluid) from CSF Tap, Tube 1 Updated: 06/11/24 0722     CSF CULTURE No Growth to date     Gram Stain Result Cytospin indicates:      Many WBC's      No organisms seen    Blood culture [[id number]] Collected: 06/08/24 0542    Order Status: Completed Specimen: Blood from Peripheral, Hand, Right Updated: 06/11/24 0703     Blood Culture, Routine No Growth to date      No Growth to date      No Growth to date      No Growth to date    Cryptococcal antigen, CSF [[id number]] Collected: 06/08/24 1247    Order Status: Completed Specimen: CSF (Spinal Fluid) from CSF Tap, Tube 1 Updated: 06/09/24 1722     Crypto Ag, CSF Negative    Respiratory Infection Panel (PCR), Nasopharyngeal [[id number]] Collected: 06/07/24 2222    Order Status: Completed Specimen: Nasopharyngeal Swab Updated: 06/08/24 2058     Respiratory Infection Panel Source NP Swab     Adenovirus Not Detected     Coronavirus 229E, Common Cold Virus Not Detected     Coronavirus HKU1, Common Cold Virus Not Detected     Coronavirus NL63, Common Cold Virus Not Detected     Coronavirus OC43, Common Cold Virus Not Detected     Comment: The Coronavirus strains detected in this test cause the common cold.  These strains are not the COVID-19 (novel Coronavirus)strain   associated with the respiratory disease outbreak.          SARS-CoV2 (COVID-19) Qualitative PCR Not Detected     Human Metapneumovirus Not Detected     Human Rhinovirus/Enterovirus Not Detected      Influenza A (subtypes H1, H1-2009,H3) Not Detected     Influenza B Not Detected     Parainfluenza Virus 1 Not Detected     Parainfluenza Virus 2 Not Detected     Parainfluenza Virus 3 Not Detected     Parainfluenza Virus 4 Not Detected     Respiratory Syncytial Virus Not Detected     Bordetella Parapertussis (TS3820) Not Detected     Bordetella pertussis (ptxP) Not Detected     Chlamydia pneumoniae Not Detected     Mycoplasma pneumoniae Not Detected    Narrative:      Assay not valid for lower respiratory specimens, alternate  testing required.    Fungus culture [[id number]] Collected: 06/08/24 1247    Order Status: Sent Specimen: CSF (Spinal Fluid) from CSF Tap, Tube 1 Updated: 06/08/24 1312    Gram stain [[id number]] Collected: 06/08/24 1247    Order Status: Canceled Specimen: CSF (Spinal Fluid) from CSF Tap, Tube 1     Respiratory Infection Panel (PCR), Nasopharyngeal [[id number]]     Order Status: Canceled Specimen: Nasopharyngeal Swab     Culture, Anaerobe [[id number]]     Order Status: Canceled Specimen: CSF (Spinal Fluid)     Aerobic culture [[id number]]     Order Status: Canceled Specimen: CSF (Spinal Fluid)     Blood culture x two cultures. Draw prior to antibiotics. [[id number]]     Order Status: Canceled Specimen: Blood     Blood culture x two cultures. Draw prior to antibiotics. [[id number]]     Order Status: Canceled Specimen: Blood             Significant Imaging: I have reviewed all pertinent imaging results/findings within the past 24 hours.

## 2024-06-11 NOTE — PLAN OF CARE
Problem: Diabetes Comorbidity  Goal: Blood Glucose Level Within Targeted Range  Outcome: Met  Intervention: Monitor and Manage Glycemia  Flowsheets (Taken 6/11/2024 1748)  Glycemic Management:   blood glucose monitored   oral hydration promoted     Problem: Infection  Goal: Absence of Infection Signs and Symptoms  Outcome: Met     Problem: Skin Injury Risk Increased  Goal: Skin Health and Integrity  Intervention: Optimize Skin Protection  Flowsheets (Taken 6/11/2024 1748)  Pressure Reduction Techniques: frequent weight shift encouraged  Pressure Reduction Devices: positioning supports utilized  Skin Protection: incontinence pads utilized  Head of Bed (HOB) Positioning: HOB elevated

## 2024-06-11 NOTE — ASSESSMENT & PLAN NOTE
78y/o F with weakness, malaise and fevers x 1 day. Source remains unclear. UA bland, no leukocytosis, CTH wnl, CT a/p with hydroureteronephrosis (no obstructing stones s/p robison) otherwise unremarkable. UA neg x 2. Noted to have neck rigidity and worsening encephalopathy concerning for meningitis. Broadened to vanc, ceftriaxone, ampicillin and acyclovir. LP revealed cloudy csf (wbc 500/ lymp / prtn 223/ gluc 100). Ampho started due to concern for fungal cns infection. Recent URI may be source of cns infection, LP results more consistent with viral etiology. Also with new lower back pain, but unclear if this is worse post-LP. Fever curve improving and mental status now has returned to baseline. No risk factors for fungal cns infection including animal exposures, travel, immunocompromised status. Encephalitis panel negative. HIV neg. EEG w/o seizures. RIP neg.     Recommendations:  - ok to continue ampicillin, vanc, ceftriaxone pending csf cultures. Anticipate stopping if cultures finalize as negative  - no evidence of strep pneumo meningitis and has received four days of dexamethasone, so would stop  - ok to continue acyclovir pending HSV and VZV pcr   - Will follow up HSV PCR, VZV PCR, west nile, VDRL, csf cultures  - would obtain L spine imaging given continued pain

## 2024-06-11 NOTE — PLAN OF CARE
Problem: Adult Inpatient Plan of Care  Goal: Plan of Care Review  Outcome: Progressing  Goal: Patient-Specific Goal (Individualized)  Outcome: Progressing  Goal: Absence of Hospital-Acquired Illness or Injury  Outcome: Progressing  Goal: Optimal Comfort and Wellbeing  Outcome: Progressing  Goal: Readiness for Transition of Care  Outcome: Progressing     Problem: Diabetes Comorbidity  Goal: Blood Glucose Level Within Targeted Range  Outcome: Progressing     Problem: Infection  Goal: Absence of Infection Signs and Symptoms  Outcome: Progressing     Problem: Skin Injury Risk Increased  Goal: Skin Health and Integrity  Outcome: Progressing     Problem: Fall Injury Risk  Goal: Absence of Fall and Fall-Related Injury  Outcome: Progressing

## 2024-06-12 LAB
ALBUMIN SERPL BCP-MCNC: 2.6 G/DL (ref 3.5–5.2)
ALP SERPL-CCNC: 46 U/L (ref 55–135)
ALT SERPL W/O P-5'-P-CCNC: 31 U/L (ref 10–44)
ANION GAP SERPL CALC-SCNC: 11 MMOL/L (ref 8–16)
AST SERPL-CCNC: 29 U/L (ref 10–40)
BACTERIA BLD CULT: NORMAL
BACTERIA CSF CULT: NO GROWTH
BASOPHILS # BLD AUTO: 0.01 K/UL (ref 0–0.2)
BASOPHILS NFR BLD: 0.1 % (ref 0–1.9)
BILIRUB SERPL-MCNC: 0.5 MG/DL (ref 0.1–1)
BUN SERPL-MCNC: 15 MG/DL (ref 8–23)
CALCIUM SERPL-MCNC: 8.1 MG/DL (ref 8.7–10.5)
CHLORIDE SERPL-SCNC: 101 MMOL/L (ref 95–110)
CO2 SERPL-SCNC: 25 MMOL/L (ref 23–29)
CREAT SERPL-MCNC: 0.9 MG/DL (ref 0.5–1.4)
DIFFERENTIAL METHOD BLD: NORMAL
EOSINOPHIL # BLD AUTO: 0 K/UL (ref 0–0.5)
EOSINOPHIL NFR BLD: 0.1 % (ref 0–8)
ERYTHROCYTE [DISTWIDTH] IN BLOOD BY AUTOMATED COUNT: 13.1 % (ref 11.5–14.5)
EST. GFR  (NO RACE VARIABLE): >60 ML/MIN/1.73 M^2
GLUCOSE SERPL-MCNC: 91 MG/DL (ref 70–110)
GRAM STN SPEC: NORMAL
HCT VFR BLD AUTO: 38.1 % (ref 37–48.5)
HGB BLD-MCNC: 12.6 G/DL (ref 12–16)
IMM GRANULOCYTES # BLD AUTO: 0.04 K/UL (ref 0–0.04)
IMM GRANULOCYTES NFR BLD AUTO: 0.5 % (ref 0–0.5)
LYMPHOCYTES # BLD AUTO: 1.9 K/UL (ref 1–4.8)
LYMPHOCYTES NFR BLD: 25.2 % (ref 18–48)
MCH RBC QN AUTO: 28.3 PG (ref 27–31)
MCHC RBC AUTO-ENTMCNC: 33.1 G/DL (ref 32–36)
MCV RBC AUTO: 86 FL (ref 82–98)
MONOCYTES # BLD AUTO: 1 K/UL (ref 0.3–1)
MONOCYTES NFR BLD: 13.7 % (ref 4–15)
NEUTROPHILS # BLD AUTO: 4.6 K/UL (ref 1.8–7.7)
NEUTROPHILS NFR BLD: 60.4 % (ref 38–73)
NRBC BLD-RTO: 0 /100 WBC
PLATELET # BLD AUTO: 237 K/UL (ref 150–450)
PMV BLD AUTO: 10.6 FL (ref 9.2–12.9)
POCT GLUCOSE: 106 MG/DL (ref 70–110)
POCT GLUCOSE: 113 MG/DL (ref 70–110)
POCT GLUCOSE: 166 MG/DL (ref 70–110)
POCT GLUCOSE: 95 MG/DL (ref 70–110)
POCT GLUCOSE: 97 MG/DL (ref 70–110)
POTASSIUM SERPL-SCNC: 2.9 MMOL/L (ref 3.5–5.1)
PROT SERPL-MCNC: 6.6 G/DL (ref 6–8.4)
RBC # BLD AUTO: 4.45 M/UL (ref 4–5.4)
SODIUM SERPL-SCNC: 137 MMOL/L (ref 136–145)
SPECIMEN SOURCE: NORMAL
VARICELLA ZOSTER BY PCR RESULT: NEGATIVE
VDRL CSF QL: NEGATIVE
WBC # BLD AUTO: 7.61 K/UL (ref 3.9–12.7)

## 2024-06-12 PROCEDURE — 25000003 PHARM REV CODE 250: Performed by: STUDENT IN AN ORGANIZED HEALTH CARE EDUCATION/TRAINING PROGRAM

## 2024-06-12 PROCEDURE — 94761 N-INVAS EAR/PLS OXIMETRY MLT: CPT

## 2024-06-12 PROCEDURE — 99232 SBSQ HOSP IP/OBS MODERATE 35: CPT | Mod: ,,, | Performed by: STUDENT IN AN ORGANIZED HEALTH CARE EDUCATION/TRAINING PROGRAM

## 2024-06-12 PROCEDURE — A4216 STERILE WATER/SALINE, 10 ML: HCPCS | Performed by: STUDENT IN AN ORGANIZED HEALTH CARE EDUCATION/TRAINING PROGRAM

## 2024-06-12 PROCEDURE — 80053 COMPREHEN METABOLIC PANEL: CPT | Performed by: STUDENT IN AN ORGANIZED HEALTH CARE EDUCATION/TRAINING PROGRAM

## 2024-06-12 PROCEDURE — 0152U NFCT DS DNA UNTRGT NGNRJ SEQ: CPT | Performed by: STUDENT IN AN ORGANIZED HEALTH CARE EDUCATION/TRAINING PROGRAM

## 2024-06-12 PROCEDURE — 25000003 PHARM REV CODE 250: Performed by: INTERNAL MEDICINE

## 2024-06-12 PROCEDURE — 27000207 HC ISOLATION

## 2024-06-12 PROCEDURE — 86788 WEST NILE VIRUS AB IGM: CPT | Performed by: STUDENT IN AN ORGANIZED HEALTH CARE EDUCATION/TRAINING PROGRAM

## 2024-06-12 PROCEDURE — 25000003 PHARM REV CODE 250: Performed by: PHYSICIAN ASSISTANT

## 2024-06-12 PROCEDURE — 63600175 PHARM REV CODE 636 W HCPCS: Performed by: STUDENT IN AN ORGANIZED HEALTH CARE EDUCATION/TRAINING PROGRAM

## 2024-06-12 PROCEDURE — 85025 COMPLETE CBC W/AUTO DIFF WBC: CPT | Performed by: STUDENT IN AN ORGANIZED HEALTH CARE EDUCATION/TRAINING PROGRAM

## 2024-06-12 PROCEDURE — 87040 BLOOD CULTURE FOR BACTERIA: CPT | Performed by: STUDENT IN AN ORGANIZED HEALTH CARE EDUCATION/TRAINING PROGRAM

## 2024-06-12 PROCEDURE — 99233 SBSQ HOSP IP/OBS HIGH 50: CPT | Mod: ,,, | Performed by: STUDENT IN AN ORGANIZED HEALTH CARE EDUCATION/TRAINING PROGRAM

## 2024-06-12 PROCEDURE — 99900035 HC TECH TIME PER 15 MIN (STAT)

## 2024-06-12 PROCEDURE — 11000001 HC ACUTE MED/SURG PRIVATE ROOM

## 2024-06-12 RX ORDER — INSULIN GLARGINE 100 [IU]/ML
4 INJECTION, SOLUTION SUBCUTANEOUS 2 TIMES DAILY
Status: DISCONTINUED | OUTPATIENT
Start: 2024-06-12 | End: 2024-06-12

## 2024-06-12 RX ORDER — POTASSIUM CHLORIDE 20 MEQ/1
40 TABLET, EXTENDED RELEASE ORAL ONCE
Status: COMPLETED | OUTPATIENT
Start: 2024-06-12 | End: 2024-06-12

## 2024-06-12 RX ADMIN — Medication 10 ML: at 06:06

## 2024-06-12 RX ADMIN — AMPICILLIN SODIUM 2 G: 2 INJECTION, POWDER, FOR SOLUTION INTRAMUSCULAR; INTRAVENOUS at 04:06

## 2024-06-12 RX ADMIN — Medication 10 ML: at 12:06

## 2024-06-12 RX ADMIN — AMPICILLIN SODIUM 2 G: 2 INJECTION, POWDER, FOR SOLUTION INTRAMUSCULAR; INTRAVENOUS at 09:06

## 2024-06-12 RX ADMIN — INSULIN ASPART 5 UNITS: 100 INJECTION, SOLUTION INTRAVENOUS; SUBCUTANEOUS at 04:06

## 2024-06-12 RX ADMIN — INSULIN ASPART 5 UNITS: 100 INJECTION, SOLUTION INTRAVENOUS; SUBCUTANEOUS at 08:06

## 2024-06-12 RX ADMIN — ENOXAPARIN SODIUM 40 MG: 40 INJECTION SUBCUTANEOUS at 04:06

## 2024-06-12 RX ADMIN — HYDRALAZINE HYDROCHLORIDE 5 MG: 20 INJECTION INTRAMUSCULAR; INTRAVENOUS at 04:06

## 2024-06-12 RX ADMIN — SODIUM CHLORIDE: 9 INJECTION, SOLUTION INTRAVENOUS at 10:06

## 2024-06-12 RX ADMIN — METOPROLOL SUCCINATE 100 MG: 50 TABLET, EXTENDED RELEASE ORAL at 08:06

## 2024-06-12 RX ADMIN — MUPIROCIN: 20 OINTMENT TOPICAL at 10:06

## 2024-06-12 RX ADMIN — POTASSIUM CHLORIDE 40 MEQ: 1500 TABLET, EXTENDED RELEASE ORAL at 10:06

## 2024-06-12 RX ADMIN — INSULIN ASPART 5 UNITS: 100 INJECTION, SOLUTION INTRAVENOUS; SUBCUTANEOUS at 12:06

## 2024-06-12 RX ADMIN — LISINOPRIL 20 MG: 20 TABLET ORAL at 08:06

## 2024-06-12 RX ADMIN — ACYCLOVIR SODIUM 570 MG: 50 INJECTION, SOLUTION INTRAVENOUS at 04:06

## 2024-06-12 RX ADMIN — CEFTRIAXONE 2 G: 2 INJECTION, POWDER, FOR SOLUTION INTRAMUSCULAR; INTRAVENOUS at 12:06

## 2024-06-12 RX ADMIN — CETIRIZINE HYDROCHLORIDE 10 MG: 10 TABLET, FILM COATED ORAL at 08:06

## 2024-06-12 RX ADMIN — ACETAMINOPHEN 650 MG: 650 SUPPOSITORY RECTAL at 12:06

## 2024-06-12 RX ADMIN — SODIUM CHLORIDE: 9 INJECTION, SOLUTION INTRAVENOUS at 04:06

## 2024-06-12 RX ADMIN — LEVETIRACETAM 500 MG: 100 INJECTION, SOLUTION INTRAVENOUS at 08:06

## 2024-06-12 RX ADMIN — ACETAMINOPHEN 650 MG: 325 TABLET ORAL at 05:06

## 2024-06-12 RX ADMIN — MUPIROCIN: 20 OINTMENT TOPICAL at 08:06

## 2024-06-12 NOTE — SUBJECTIVE & OBJECTIVE
Interval History:  T-max 100.4° F overnight.  Repeat blood cultures urinalysis and urine culture.  CSF studies largely unremarkable.  PT/OT on board.  Neurology reconsulted to evaluate need for Keppra.  Daughter reports on and off mentation    Review of Systems   Constitutional: Negative.    Respiratory: Negative.     Cardiovascular: Negative.    Gastrointestinal: Negative.    Genitourinary: Negative.    Musculoskeletal: Negative.    Neurological:  Positive for weakness.     Objective:     Vital Signs (Most Recent):  Temp: 99.4 °F (37.4 °C) (06/12/24 0803)  Pulse: 73 (06/12/24 1102)  Resp: 17 (06/12/24 0817)  BP: 135/69 (06/12/24 0803)  SpO2: 99 % (06/12/24 0817) Vital Signs (24h Range):  Temp:  [98.3 °F (36.8 °C)-100.4 °F (38 °C)] 99.4 °F (37.4 °C)  Pulse:  [] 73  Resp:  [17-18] 17  SpO2:  [92 %-100 %] 99 %  BP: (118-173)/(59-85) 135/69     Weight: 79 kg (174 lb 2.6 oz)  Body mass index is 28.98 kg/m².    Intake/Output Summary (Last 24 hours) at 6/12/2024 1140  Last data filed at 6/11/2024 1206  Gross per 24 hour   Intake 240 ml   Output --   Net 240 ml         Physical Exam  Constitutional:       General: She is not in acute distress.     Appearance: She is normal weight.   Cardiovascular:      Rate and Rhythm: Normal rate.      Pulses: Normal pulses.      Heart sounds: No murmur heard.  Pulmonary:      Effort: No respiratory distress.      Breath sounds: Normal breath sounds. No wheezing.   Abdominal:      General: Bowel sounds are normal. There is no distension.      Palpations: Abdomen is soft.      Tenderness: There is no abdominal tenderness.   Musculoskeletal:      Right lower leg: No edema.      Left lower leg: No edema.   Skin:     General: Skin is warm.   Neurological:      Mental Status: She is alert and oriented to person, place, and time. Mental status is at baseline.   Psychiatric:         Mood and Affect: Mood normal.             Significant Labs: All pertinent labs within the past 24 hours  have been reviewed.    Significant Imaging: I have reviewed all pertinent imaging results/findings within the past 24 hours.

## 2024-06-12 NOTE — PROGRESS NOTES
Pharmacokinetic Assessment Follow Up: IV Vancomycin    Vancomycin serum concentration assessment(s):    The trough level was drawn correctly and can be used to guide therapy at this time. The measurement is within the desired definitive target range of 10 to 20 mcg/mL.    Vancomycin Regimen Plan:    Continue regimen to Vancomycin 1250 mg IV every 24 hours with next serum trough concentration measured at 1900 prior to 3rd dose on 6/13    Drug levels (last 3 results):  Recent Labs   Lab Result Units 06/09/24  1601 06/11/24  1908   Vancomycin-Trough ug/mL 8.4* 11.1       Pharmacy will continue to follow and monitor vancomycin.    Please contact pharmacy at extension  for questions regarding this assessment.    Thank you for the consult,   Jaswant Johnson       Patient brief summary:  Sussy Fulton is a 79 y.o. female initiated on antimicrobial therapy with IV Vancomycin for treatment of  fever unknown source    The patient's current regimen is 1250 q 24h    Drug Allergies:   Review of patient's allergies indicates:  No Known Allergies    Actual Body Weight:   79kg    Renal Function:   Estimated Creatinine Clearance: 48.9 mL/min (based on SCr of 1 mg/dL).,     Dialysis Method (if applicable):  N/A    CBC (last 72 hours):  Recent Labs   Lab Result Units 06/09/24  0437 06/10/24  0519 06/11/24  0502   WBC K/uL 10.97 11.54 9.36   Hemoglobin g/dL 12.4 11.5* 11.8*   Hematocrit % 37.8 34.5* 36.0*   Platelets K/uL 156 170 180   Gran % % 85.1* 88.3* 85.5*   Lymph % % 9.6* 6.4* 8.5*   Mono % % 4.8 4.8 4.8   Eosinophil % % 0.0 0.0 0.0   Basophil % % 0.1 0.1 0.1   Differential Method  Automated Automated Automated       Metabolic Panel (last 72 hours):  Recent Labs   Lab Result Units 06/09/24  0437 06/10/24  0519 06/11/24  0502   Sodium mmol/L 138 137 136   Potassium mmol/L 3.4* 3.3* 3.6   Chloride mmol/L 106 106 105   CO2 mmol/L 23 24 21*   Glucose mg/dL 240* 230* 198*   BUN mg/dL 18 27* 23   Creatinine mg/dL 1.0 1.2 1.0    Albumin g/dL 2.6* 2.4* 2.5*   Total Bilirubin mg/dL 0.4 0.3 0.4   Alkaline Phosphatase U/L 52* 43* 46*   AST U/L 20 21 22   ALT U/L 11 14 21   Magnesium mg/dL 1.7 1.7 1.8   Phosphorus mg/dL 2.7 2.4* 2.3*       Vancomycin Administrations:  vancomycin given in the last 96 hours                     vancomycin 1,250 mg in dextrose 5 % (D5W) 250 mL IVPB (Vial-Mate) (mg) 1,250 mg New Bag 06/10/24 2105     1,250 mg New Bag 06/09/24 1824    vancomycin (VANCOCIN) 1,000 mg in dextrose 5 % (D5W) 250 mL IVPB (Vial-Mate) (mg) 1,000 mg New Bag 06/08/24 1952                    Microbiologic Results:  Microbiology Results (last 7 days)       Procedure Component Value Units Date/Time    Blood culture x two cultures. Draw prior to antibiotics. [[id number]] Collected: 06/07/24 1124    Order Status: Completed Specimen: Blood from Peripheral, Antecubital, Left Updated: 06/11/24 1303     Blood Culture, Routine No Growth after 4 days.    Narrative:      Aerobic and anaerobic    Blood culture x two cultures. Draw prior to antibiotics. [[id number]] Collected: 06/07/24 1138    Order Status: Completed Specimen: Blood from Peripheral, Upper Arm, Right Updated: 06/11/24 1303     Blood Culture, Routine No Growth after 4 days.    Narrative:      Aerobic and anaerobic    CSF culture [[id number]] Collected: 06/08/24 1247    Order Status: Completed Specimen: CSF (Spinal Fluid) from CSF Tap, Tube 1 Updated: 06/11/24 0722     CSF CULTURE No Growth to date     Gram Stain Result Cytospin indicates:      Many WBC's      No organisms seen    Blood culture [[id number]] Collected: 06/08/24 0542    Order Status: Completed Specimen: Blood from Peripheral, Hand, Right Updated: 06/11/24 0703     Blood Culture, Routine No Growth to date      No Growth to date      No Growth to date      No Growth to date    Cryptococcal antigen, CSF [[id number]] Collected: 06/08/24 1247    Order Status: Completed Specimen: CSF (Spinal Fluid) from CSF Tap, Tube 1 Updated:  06/09/24 1722     Crypto Ag, CSF Negative    Respiratory Infection Panel (PCR), Nasopharyngeal [[id number]] Collected: 06/07/24 2222    Order Status: Completed Specimen: Nasopharyngeal Swab Updated: 06/08/24 2058     Respiratory Infection Panel Source NP Swab     Adenovirus Not Detected     Coronavirus 229E, Common Cold Virus Not Detected     Coronavirus HKU1, Common Cold Virus Not Detected     Coronavirus NL63, Common Cold Virus Not Detected     Coronavirus OC43, Common Cold Virus Not Detected     Comment: The Coronavirus strains detected in this test cause the common cold.  These strains are not the COVID-19 (novel Coronavirus)strain   associated with the respiratory disease outbreak.          SARS-CoV2 (COVID-19) Qualitative PCR Not Detected     Human Metapneumovirus Not Detected     Human Rhinovirus/Enterovirus Not Detected     Influenza A (subtypes H1, H1-2009,H3) Not Detected     Influenza B Not Detected     Parainfluenza Virus 1 Not Detected     Parainfluenza Virus 2 Not Detected     Parainfluenza Virus 3 Not Detected     Parainfluenza Virus 4 Not Detected     Respiratory Syncytial Virus Not Detected     Bordetella Parapertussis (LN2322) Not Detected     Bordetella pertussis (ptxP) Not Detected     Chlamydia pneumoniae Not Detected     Mycoplasma pneumoniae Not Detected    Narrative:      Assay not valid for lower respiratory specimens, alternate  testing required.    Fungus culture [[id number]] Collected: 06/08/24 1247    Order Status: Sent Specimen: CSF (Spinal Fluid) from CSF Tap, Tube 1 Updated: 06/08/24 1312    Gram stain [[id number]] Collected: 06/08/24 1247    Order Status: Canceled Specimen: CSF (Spinal Fluid) from CSF Tap, Tube 1     Respiratory Infection Panel (PCR), Nasopharyngeal [[id number]]     Order Status: Canceled Specimen: Nasopharyngeal Swab     Culture, Anaerobe [[id number]]     Order Status: Canceled Specimen: CSF (Spinal Fluid)     Aerobic culture [[id number]]     Order Status: Canceled  Specimen: CSF (Spinal Fluid)     Blood culture x two cultures. Draw prior to antibiotics. [[id number]]     Order Status: Canceled Specimen: Blood     Blood culture x two cultures. Draw prior to antibiotics. [[id number]]     Order Status: Canceled Specimen: Blood

## 2024-06-12 NOTE — ASSESSMENT & PLAN NOTE
- Sepsis with b/l hydronephrosis, unclear origin of infection as urine is clean. No skin cellulitis or wounds. No oral/dental infx. No PNA. IV abx and robison placed. 103F despite V+Z, Stiff neck, shaking, Alterned mental status, Alert but not following any   commands, not tracking staring blank. Neurogenic bladder w Hydroneph- no bacteria or WBC in urine.     - Starting meningitis antibiotics. Meningitis meds started. Move to ICU. IR consult for LP. EEG ordered. ID and Neuro consult.   - LP performed on 6/8 --> Cell count/diff with 560 WBC, predominantly lymphocytes with elevated protein and glucose  - on broad spectrum meningitis antibiotics while cultures/labs return  - mental status is improving with current regimen  - appreciate ID recommendations  - EEG  suggestive of mild diffuse cerebral dysfunction. No epileptiform activity or electrographic seizures seen.  -MRI revealed no acute intracranial abnormality.  MRI L-spine with degenerative disc disease.  -de-escalated to acyclovir alone per ID recommendations

## 2024-06-12 NOTE — PROGRESS NOTES
[facility] - [place] - Telemetry  Infectious Disease  Progress Note    Patient Name: Sussy Fulton  MRN: [id number]  Admission Date: 6/7/2024  Length of Stay: 5 days  Attending Physician: Sofia Blanchard,*  Primary Care Provider: Arlen Rivera MD    Isolation Status: Droplet  Assessment/Plan:      Other  Fever, unknown origin  78y/o F with weakness, malaise and fevers x 1 day.  UA bland, no leukocytosis, CTH wnl, CT a/p with hydroureteronephrosis (no obstructing stones s/p robison) otherwise unremarkable. UA neg x 2. Noted to have neck pain and worsening encephalopathy concerning for meningitis. Broadened to vanc, ceftriaxone, ampicillin and acyclovir. LP revealed cloudy csf (wbc 500/ lymp / prtn 223/ gluc 100). Ampho started due to concern for fungal cns infection. Recent URI may be source of cns infection, LP results more consistent with viral etiology. CSF cultures and crypto antigen negative. CSF encephalitis/meningitis PCR panel negative, HSV 1/2 PCR negative, VZV pending. HIV neg. EEG w/o seizures. RIP neg. Has been afebrile, but mental status seems worse today. Viral encephalitis high on differential, but would consider other non-infectious causes given stalled improvement. Ceftriaxone could be causing some neurotoxicity, so would stop given negative bacterial meningitis work up.     Recommendations:  - would stop ceftriaxone, ampicillin and vancomycin given finalized negative cultures and ME panel with CSF cell counts not consistent with bacterial meningitis  - would continue acyclovir pending VZV PCR, dosing per pharmacy   - Will follow up VZV PCR, West Nile, VDRL  - if she continues to have minimal improvement, would repeat LP with cell counts, protein, glucose, HSV PCR  - consider neurology re-evaluation for other causes of presentation       Above discussed with primary team.     Time: 50 minutes   50% of time spent on face-to-face counseling and coordination of care. Counseling included review of  "test results, diagnosis, and treatment plan with patient and/or family.  I have reviewed hospital notes from  service and other specialty providers. I have also reviewed CBC, CMP/BMP,  cultures and imaging with my interpretation as documented. Patient is high risk of morbidity, on antibiotics requiring intensive monitoring for toxicity.     Anticipated Disposition: TBD    Thank you for your consult. I will follow-up with patient. Please contact us if you have any additional questions.    Marla Becerril MD  Infectious Disease  [facility] - [place] - Telemetry    Subjective:     Principal Problem:Meningoencephalitis    HPI: 80y/o F pt w hx of HTN, HLD, T2DM  presented to the ER with family due to weakness x1 day and not feeling well and was admitted 6/7 for presumed sepsis with unclear source.    Pt denied nausea, vomiting, diarrhea, shortness breath or cough.  States that she started feeling bad after eating Chinese food with her family last night.  Denies sick contacts.      In the ED  pt was febrile to 103°.  Labs remarkable for wbc 11.8, LA wnl, UA bland,  COVID neg.  Started on empiric vanc and Zosyn. CT head unremarkable. CT of the abd/pel revealed: "Bilateral hydroureteronephrosis and moderate urinary bladder distension.  No nephrolithiasis.  Findings may be related to obstructive uropathy." Urology consulted and recommended robison placement and continued IV antibiotics.  Culture sent from the Robison placed and pending.    Hospital course c/b fevers despite bs abx and worsening acute encephalopathy prompting transfer to icu and broadening of abx to vacn, ceftriaxone, ampicillin and acyclovir for meningitis coverage.    ID consulted for: "103F despite V+Z, Stiff neck, shaking, Alterned mental status, Alert but not following any commands, not tracking staring blank. ?neuorgenic bladder w Hydroneph- no bacterio or WBC in urine "    Pt reports recent sinusitis 1 wk prior for which she received a steroid injection and " albuterol. Notes new b/l lower back pain on day of presentation, but otherwise no new symptoms. Denies sick contacts, animal exposures, recent travel or recent surgeries/ dental procedures. Does not work. Lives with her  who is a dialysis pt and her son. Denies having eaten any unpasteurized dairy products. Ate chinese food tues, but nobody else got sick.   Interval History: family at bedside states she seems worse today. Overnight was unable to ambulate to and from bathroom independently. Seems more confused/tired. Her headache is improved, but now with pain around bilateral ears, jaw, neck and R shoulder. Still having nasal congestion.     Review of Systems   Constitutional:  Positive for fatigue. Negative for fever.   HENT:  Positive for ear pain and rhinorrhea. Negative for hearing loss.    Eyes:  Negative for photophobia.   Respiratory:  Positive for cough. Negative for shortness of breath.    Cardiovascular: Negative.    Gastrointestinal: Negative.    Musculoskeletal:  Positive for back pain.   Skin: Negative.    Neurological:  Positive for headaches.   Psychiatric/Behavioral:  Positive for agitation, confusion and decreased concentration.      Objective:     Vital Signs (Most Recent):  Temp: 99.4 °F (37.4 °C) (06/12/24 0803)  Pulse: 73 (06/12/24 1102)  Resp: 17 (06/12/24 0817)  BP: 135/69 (06/12/24 0803)  SpO2: 99 % (06/12/24 0817) Vital Signs (24h Range):  Temp:  [98.3 °F (36.8 °C)-100.4 °F (38 °C)] 99.4 °F (37.4 °C)  Pulse:  [] 73  Resp:  [17-18] 17  SpO2:  [92 %-100 %] 99 %  BP: (118-173)/(59-85) 135/69     Weight: 79 kg (174 lb 2.6 oz)  Body mass index is 28.98 kg/m².    Estimated Creatinine Clearance: 48.9 mL/min (based on SCr of 1 mg/dL).     Physical Exam  Vitals and nursing note reviewed.   Constitutional:       Appearance: She is ill-appearing. She is not toxic-appearing.   HENT:      Head: Normocephalic.      Mouth/Throat:      Mouth: Mucous membranes are dry.      Pharynx: No  oropharyngeal exudate.   Pulmonary:      Effort: Pulmonary effort is normal. No respiratory distress.   Abdominal:      General: There is no distension.      Palpations: Abdomen is soft.      Tenderness: There is no abdominal tenderness.   Musculoskeletal:      Cervical back: Tenderness (pain to lateral neck, no meningismus) present.   Skin:     General: Skin is warm and dry.      Findings: No rash.   Neurological:      Mental Status: She is disoriented.          Significant Labs:   Microbiology Results (last 7 days)       Procedure Component Value Units Date/Time    Blood culture [[id number]]     Order Status: Sent Specimen: Blood     Blood culture [[id number]]     Order Status: Sent Specimen: Blood     CSF culture [[id number]] Collected: 06/08/24 1247    Order Status: Completed Specimen: CSF (Spinal Fluid) from CSF Tap, Tube 1 Updated: 06/12/24 0735     CSF CULTURE No Growth     Gram Stain Result Cytospin indicates:      Many WBC's      No organisms seen    Blood culture [[id number]] Collected: 06/08/24 0542    Order Status: Completed Specimen: Blood from Peripheral, Hand, Right Updated: 06/12/24 0703     Blood Culture, Routine No Growth after 4 days.    Blood culture x two cultures. Draw prior to antibiotics. [[id number]] Collected: 06/07/24 1124    Order Status: Completed Specimen: Blood from Peripheral, Antecubital, Left Updated: 06/11/24 1303     Blood Culture, Routine No Growth after 4 days.    Narrative:      Aerobic and anaerobic    Blood culture x two cultures. Draw prior to antibiotics. [[id number]] Collected: 06/07/24 1138    Order Status: Completed Specimen: Blood from Peripheral, Upper Arm, Right Updated: 06/11/24 1303     Blood Culture, Routine No Growth after 4 days.    Narrative:      Aerobic and anaerobic    Cryptococcal antigen, CSF [[id number]] Collected: 06/08/24 1247    Order Status: Completed Specimen: CSF (Spinal Fluid) from CSF Tap, Tube 1 Updated: 06/09/24 1722     Crypto Ag, CSF Negative     Respiratory Infection Panel (PCR), Nasopharyngeal [[id number]] Collected: 06/07/24 2222    Order Status: Completed Specimen: Nasopharyngeal Swab Updated: 06/08/24 2058     Respiratory Infection Panel Source NP Swab     Adenovirus Not Detected     Coronavirus 229E, Common Cold Virus Not Detected     Coronavirus HKU1, Common Cold Virus Not Detected     Coronavirus NL63, Common Cold Virus Not Detected     Coronavirus OC43, Common Cold Virus Not Detected     Comment: The Coronavirus strains detected in this test cause the common cold.  These strains are not the COVID-19 (novel Coronavirus)strain   associated with the respiratory disease outbreak.          SARS-CoV2 (COVID-19) Qualitative PCR Not Detected     Human Metapneumovirus Not Detected     Human Rhinovirus/Enterovirus Not Detected     Influenza A (subtypes H1, H1-2009,H3) Not Detected     Influenza B Not Detected     Parainfluenza Virus 1 Not Detected     Parainfluenza Virus 2 Not Detected     Parainfluenza Virus 3 Not Detected     Parainfluenza Virus 4 Not Detected     Respiratory Syncytial Virus Not Detected     Bordetella Parapertussis (SY0460) Not Detected     Bordetella pertussis (ptxP) Not Detected     Chlamydia pneumoniae Not Detected     Mycoplasma pneumoniae Not Detected    Narrative:      Assay not valid for lower respiratory specimens, alternate  testing required.    Fungus culture [[id number]] Collected: 06/08/24 1247    Order Status: Sent Specimen: CSF (Spinal Fluid) from CSF Tap, Tube 1 Updated: 06/08/24 1312    Gram stain [[id number]] Collected: 06/08/24 1247    Order Status: Canceled Specimen: CSF (Spinal Fluid) from CSF Tap, Tube 1     Respiratory Infection Panel (PCR), Nasopharyngeal [[id number]]     Order Status: Canceled Specimen: Nasopharyngeal Swab     Culture, Anaerobe [[id number]]     Order Status: Canceled Specimen: CSF (Spinal Fluid)     Aerobic culture [[id number]]     Order Status: Canceled Specimen: CSF (Spinal Fluid)     Blood  culture x two cultures. Draw prior to antibiotics. [[id number]]     Order Status: Canceled Specimen: Blood     Blood culture x two cultures. Draw prior to antibiotics. [[id number]]     Order Status: Canceled Specimen: Blood             Significant Imaging: I have reviewed all pertinent imaging results/findings within the past 24 hours.

## 2024-06-12 NOTE — PLAN OF CARE
Problem: Adult Inpatient Plan of Care  Goal: Optimal Comfort and Wellbeing  Intervention: Monitor Pain and Promote Comfort  Flowsheets (Taken 6/12/2024 0646)  Pain Management Interventions:   care clustered   medication offered   quiet environment facilitated   warm blanket provided   position adjusted     Problem: Fall Injury Risk  Goal: Absence of Fall and Fall-Related Injury  Intervention: Promote Injury-Free Environment  Flowsheets (Taken 6/12/2024 0646)  Safety Promotion/Fall Prevention:   assistive device/personal item within reach   bed alarm set   medications reviewed   room near unit station   side rails raised x 2   instructed to call staff for mobility

## 2024-06-12 NOTE — NURSING
[facility], [facility] - [place]  Nurses Note -- 4 Eyes      6/12/2024       Skin assessed on: Q Shift      [x] No Pressure Injuries Present    [x]Prevention Measures Documented    [] Yes LDA  for Pressure Injury Previously documented     [] Yes New Pressure Injury Discovered   [] LDA for New Pressure Injury Added      Attending RN:  Bryanna Felder, RN     Second RN:  Yg RIOS RN

## 2024-06-12 NOTE — PROGRESS NOTES
Vancomycin consult follow-up:    Patient reviewed, renal function stable, no new levels, continue current therapy; Next levels due: trough due 6/13/2024 at 1900

## 2024-06-12 NOTE — ASSESSMENT & PLAN NOTE
78y/o F with weakness, malaise and fevers x 1 day.  UA bland, no leukocytosis, CTH wnl, CT a/p with hydroureteronephrosis (no obstructing stones s/p robison) otherwise unremarkable. UA neg x 2. Noted to have neck pain and worsening encephalopathy concerning for meningitis. Broadened to vanc, ceftriaxone, ampicillin and acyclovir. LP revealed cloudy csf (wbc 500/ lymp / prtn 223/ gluc 100). Ampho started due to concern for fungal cns infection. Recent URI may be source of cns infection, LP results more consistent with viral etiology. CSF cultures and crypto antigen negative. CSF encephalitis/meningitis PCR panel negative, HSV 1/2 PCR negative, VZV pending. HIV neg. EEG w/o seizures. RIP neg. Has been afebrile, but mental status seems worse today. Viral encephalitis high on differential, but would consider other non-infectious causes given stalled improvement. Ceftriaxone could be causing some neurotoxicity, so would stop given negative bacterial meningitis work up.     Recommendations:  - would stop ceftriaxone, ampicillin and vancomycin given finalized negative cultures and ME panel with CSF cell counts not consistent with bacterial meningitis  - would continue acyclovir pending VZV PCR, dosing per pharmacy   - Will follow up VZV PCR, West Nile, VDRL  - if she continues to have minimal improvement, would repeat LP with cell counts, protein, glucose, HSV PCR  - consider neurology re-evaluation for other causes of presentation

## 2024-06-12 NOTE — PROGRESS NOTES
[facility] Medicine  Progress Note    Patient Name: Sussy Fulton  MRN: [id number]  Patient Class: IP- Inpatient   Admission Date: 6/7/2024  Length of Stay: 5 days  Attending Physician: Sofia Blanchard,*  Primary Care Provider: Arlen Rivera MD        Subjective:     Principal Problem:Meningoencephalitis        HPI:  79 y.o.  female with a past medical history significant for GERD, hypertension, hyperlipidemia and non-insulin-dependent type 2 diabetes presents to the ER with family due to weakness x1 day and not feeling well.  Patient denies any nausea any vomiting any diarrhea.  Patient denies any shortness breath or cough.  States that she started feeling bad after eating Chinese food with her family last night.  No one else in the family is feeling sick.  While she was in the ER was noted to have a temperature of a 103°.  Labs noted for white count of 11.8.  Lactic was normal.  Troponins were negative.  Urine was negative for any UTI.  COVID was negative chemistry was overall unremarkable.  Cultures were sent and she was started on vanc and Zosyn.  We were consulted for further evaluation and management of fever of unknown origin.  CT head was done and also negative for any mass or bleed.    On exam patient was noted to be tender on her right flank.  CT of the abdomen and pelvis with IV contrast was ordered for possible stone versus pyelo versus abscess.  CT of the abdomen and pelvis noted: Impression: Bilateral hydroureteronephrosis and moderate urinary bladder distension.  No nephrolithiasis.  Findings may be related to obstructive uropathy. Hepatic steatosis. Cholelithiasis and or a small gallbladder sludge.Small hiatal hernia. Consult urology was placed.  I contacted Urology to let them know the consulted to discuss possibilities of an infected stone.  Urology reviewed the report with me and no stone was mentioned.  Recommended Muhammad placement and continued  IV antibiotics.  We will follow up with her.  Culture sent from the Muhammad placed and pending.      Overview/Hospital Course:  79-year-old  female with past medical history of hypertension, hyperlipidemia, diabetes, GERD who was admitted for Sepsis likely secondary to meningoencephalitis.  Acute metabolic encephalopathy.  CT with b/l hydronephrosis.  Patient was initiated on empiric therapy for meningitis given stiff neck and AMS and moved to ICU.  ID and Neurology were consulted.  status post IR guided LP with 560 WBC and 75% lymphocytes - concerning for viral or fungal. Elevated Protein 223. On Amphotericin B, vanc, ceftriaxone, ampicillin, acylocivir and dexamethasone. Awaiting further culture data.  DC amphotericin and Decadron per ID recs.  Mental status improving.  MRI negative  EEG revealed diffuse slowing suggestive of mild diffuse cerebral dysfunction. No epileptiform activity or electrographic seizures seen.  VZV VDRL and West Nile pending.  Repeat blood cultures and urinalysis with urine culture.  Voiding trial today.  PT/OT on board.  Reconsulted Neurology .  Discontinuing Keppra per Neurology recommendation    Interval History:  T-max 100.4° F overnight.  Repeat blood cultures urinalysis and urine culture.  CSF studies largely unremarkable.  PT/OT on board.  Neurology reconsulted to evaluate need for Keppra.  Daughter reports on and off mentation    Review of Systems   Constitutional: Negative.    Respiratory: Negative.     Cardiovascular: Negative.    Gastrointestinal: Negative.    Genitourinary: Negative.    Musculoskeletal: Negative.    Neurological:  Positive for weakness.     Objective:     Vital Signs (Most Recent):  Temp: 99.4 °F (37.4 °C) (06/12/24 0803)  Pulse: 73 (06/12/24 1102)  Resp: 17 (06/12/24 0817)  BP: 135/69 (06/12/24 0803)  SpO2: 99 % (06/12/24 0817) Vital Signs (24h Range):  Temp:  [98.3 °F (36.8 °C)-100.4 °F (38 °C)] 99.4 °F (37.4 °C)  Pulse:  [] 73  Resp:   [17-18] 17  SpO2:  [92 %-100 %] 99 %  BP: (118-173)/(59-85) 135/69     Weight: 79 kg (174 lb 2.6 oz)  Body mass index is 28.98 kg/m².    Intake/Output Summary (Last 24 hours) at 6/12/2024 1140  Last data filed at 6/11/2024 1206  Gross per 24 hour   Intake 240 ml   Output --   Net 240 ml         Physical Exam  Constitutional:       General: She is not in acute distress.     Appearance: She is normal weight.   Cardiovascular:      Rate and Rhythm: Normal rate.      Pulses: Normal pulses.      Heart sounds: No murmur heard.  Pulmonary:      Effort: No respiratory distress.      Breath sounds: Normal breath sounds. No wheezing.   Abdominal:      General: Bowel sounds are normal. There is no distension.      Palpations: Abdomen is soft.      Tenderness: There is no abdominal tenderness.   Musculoskeletal:      Right lower leg: No edema.      Left lower leg: No edema.   Skin:     General: Skin is warm.   Neurological:      Mental Status: She is alert and oriented to person, place, and time. Mental status is at baseline.   Psychiatric:         Mood and Affect: Mood normal.             Significant Labs: All pertinent labs within the past 24 hours have been reviewed.    Significant Imaging: I have reviewed all pertinent imaging results/findings within the past 24 hours.      Assessment/Plan:      * Meningoencephalitis  - Sepsis with b/l hydronephrosis, unclear origin of infection as urine is clean. No skin cellulitis or wounds. No oral/dental infx. No PNA. IV abx and robison placed. 103F despite V+Z, Stiff neck, shaking, Alterned mental status, Alert but not following any   commands, not tracking staring blank. Neurogenic bladder w Hydroneph- no bacteria or WBC in urine.     - Starting meningitis antibiotics. Meningitis meds started. Move to ICU. IR consult for LP. EEG ordered. ID and Neuro consult.   - LP performed on 6/8 --> Cell count/diff with 560 WBC, predominantly lymphocytes with elevated protein and glucose  - on broad  spectrum meningitis antibiotics while cultures/labs return  - mental status is improving with current regimen  - appreciate ID recommendations  - EEG  suggestive of mild diffuse cerebral dysfunction. No epileptiform activity or electrographic seizures seen.  -MRI revealed no acute intracranial abnormality.  MRI L-spine with degenerative disc disease.  -de-escalated to acyclovir alone per ID recommendations    Hydroureteronephrosis  Urology recommended no acute intervention given no evidence of UTI/ANITA.  voiding trial today    Fever, unknown origin  - suspect due to meningitis  -id on board.  Fever curve improved      HLD (hyperlipidemia)  Resume statin.      DM (diabetes mellitus)  Patient's FSGs are controlled on current medication regimen.  Last A1c reviewed-   Lab Results   Component Value Date    HGBA1C 8.0 (H) 06/08/2024     Most recent fingerstick glucose reviewed-   Recent Labs   Lab 06/10/24  1753 06/10/24  2007 06/11/24  0835 06/11/24  1151   POCTGLUCOSE 224* 210* 230* 247*       Current correctional scale  Low  Maintain anti-hyperglycemic dose as follows-   Antihyperglycemics (From admission, onward)      Start     Stop Route Frequency Ordered    06/10/24 2100  insulin glargine U-100 (Lantus) pen 6 Units         -- SubQ 2 times daily 06/10/24 1243    06/09/24 1645  insulin aspart U-100 pen 5 Units         -- SubQ 3 times daily with meals 06/09/24 1209    06/09/24 1308  insulin aspart U-100 pen 1-10 Units         -- SubQ Before meals & nightly PRN 06/09/24 1209          Hold Oral hypoglycemics while patient is in the hospital.  A1c 8.  Monitor blood glucose on Decadron    HTN (hypertension)  Chronic, controlled. Latest blood pressure and vitals reviewed-     Temp:  [97.9 °F (36.6 °C)-98.9 °F (37.2 °C)]   Pulse:  []   Resp:  [16-23]   BP: (120-179)/()   SpO2:  [95 %-100 %] .   Home meds for hypertension were reviewed and noted below.   Hypertension Medications               lisinopriL  (PRINIVIL,ZESTRIL) 20 MG tablet Take 20 mg by mouth 2 (two) times a day.    metoprolol succinate (TOPROL-XL) 200 MG 24 hr tablet Take 200 mg by mouth once daily.            While in the hospital, will manage blood pressure as follows; Continue home antihypertensive regimen    Will utilize p.r.n. blood pressure medication only if patient's blood pressure greater than 180/110 and she develops symptoms such as worsening chest pain or shortness of breath.      VTE Risk Mitigation (From admission, onward)           Ordered     enoxaparin injection 40 mg  Daily         06/07/24 1857     IP VTE HIGH RISK PATIENT  Once         06/07/24 1857     Place sequential compression device  Until discontinued         06/07/24 1857                    Discharge Planning   PHYLICIA:      Code Status: Full Code   Is the patient medically ready for discharge?:     Reason for patient still in hospital (select all that apply): Patient trending condition and Treatment  Discharge Plan A: Home   Discharge Delays: None known at this time              Sofia Blanchard MD  Department of Hospital Medicine   [facility] [place] [facility]

## 2024-06-12 NOTE — NURSING
Lab went to the room to draw up blood, unsuccessful. Family refused to stick the patient again. RN tried to draw the blood from the midline but unable to get blood return. ,morning shift aware.

## 2024-06-12 NOTE — NURSING
[facility], [facility] - [place]  Nurses Note -- 4 Eyes      6/12/2024       Skin assessed on: Q Shift      [x] No Pressure Injuries Present    [x]Prevention Measures Documented    [] Yes LDA  for Pressure Injury Previously documented     [] Yes New Pressure Injury Discovered   [] LDA for New Pressure Injury Added      Attending RN:  Yg Taylor RN     Second RN:  ANIRUDH Juárez

## 2024-06-12 NOTE — PROGRESS NOTES
Pharmacist Renal Dose Adjustment Note    Sussy Fulton is a 79 y.o. female being treated with the medication Acyclovir    Patient Data:    Vital Signs (Most Recent):  Temp: 99.4 °F (37.4 °C) (06/12/24 0803)  Pulse: 71 (06/12/24 0817)  Resp: 17 (06/12/24 0817)  BP: 135/69 (06/12/24 0803)  SpO2: 99 % (06/12/24 0817) Vital Signs (72h Range):  Temp:  [97.9 °F (36.6 °C)-100.4 °F (38 °C)]   Pulse:  []   Resp:  [15-39]   BP: ()/()   SpO2:  [92 %-100 %]      Recent Labs   Lab 06/09/24  0437 06/10/24  0519 06/11/24  0502   CREATININE 1.0 1.2 1.0     Serum creatinine: 1 mg/dL 06/11/24 0502  Estimated creatinine clearance: 48.9 mL/min    Medication: Acylovir 540mg IV q8h frequency  will be changed to medication:Acyclovir 540mg IV q12hr frequency    Pharmacist's Name: Diana Funk  Pharmacist's Extension: [phone]

## 2024-06-12 NOTE — SUBJECTIVE & OBJECTIVE
Interval History: family at bedside states she seems worse today. Overnight was unable to ambulate to and from bathroom independently. Seems more confused/tired. Her headache is improved, but now with pain around bilateral ears, jaw, neck and R shoulder. Still having nasal congestion.     Review of Systems   Constitutional:  Positive for fatigue. Negative for fever.   HENT:  Positive for ear pain and rhinorrhea. Negative for hearing loss.    Eyes:  Negative for photophobia.   Respiratory:  Positive for cough. Negative for shortness of breath.    Cardiovascular: Negative.    Gastrointestinal: Negative.    Musculoskeletal:  Positive for back pain.   Skin: Negative.    Neurological:  Positive for headaches.   Psychiatric/Behavioral:  Positive for agitation, confusion and decreased concentration.      Objective:     Vital Signs (Most Recent):  Temp: 99.4 °F (37.4 °C) (06/12/24 0803)  Pulse: 73 (06/12/24 1102)  Resp: 17 (06/12/24 0817)  BP: 135/69 (06/12/24 0803)  SpO2: 99 % (06/12/24 0817) Vital Signs (24h Range):  Temp:  [98.3 °F (36.8 °C)-100.4 °F (38 °C)] 99.4 °F (37.4 °C)  Pulse:  [] 73  Resp:  [17-18] 17  SpO2:  [92 %-100 %] 99 %  BP: (118-173)/(59-85) 135/69     Weight: 79 kg (174 lb 2.6 oz)  Body mass index is 28.98 kg/m².    Estimated Creatinine Clearance: 48.9 mL/min (based on SCr of 1 mg/dL).     Physical Exam  Vitals and nursing note reviewed.   Constitutional:       Appearance: She is ill-appearing. She is not toxic-appearing.   HENT:      Head: Normocephalic.      Mouth/Throat:      Mouth: Mucous membranes are dry.      Pharynx: No oropharyngeal exudate.   Pulmonary:      Effort: Pulmonary effort is normal. No respiratory distress.   Abdominal:      General: There is no distension.      Palpations: Abdomen is soft.      Tenderness: There is no abdominal tenderness.   Musculoskeletal:      Cervical back: Tenderness (pain to lateral neck, no meningismus) present.   Skin:     General: Skin is warm and  dry.      Findings: No rash.   Neurological:      Mental Status: She is disoriented.          Significant Labs:   Microbiology Results (last 7 days)       Procedure Component Value Units Date/Time    Blood culture [[id number]]     Order Status: Sent Specimen: Blood     Blood culture [[id number]]     Order Status: Sent Specimen: Blood     CSF culture [[id number]] Collected: 06/08/24 1247    Order Status: Completed Specimen: CSF (Spinal Fluid) from CSF Tap, Tube 1 Updated: 06/12/24 0735     CSF CULTURE No Growth     Gram Stain Result Cytospin indicates:      Many WBC's      No organisms seen    Blood culture [[id number]] Collected: 06/08/24 0542    Order Status: Completed Specimen: Blood from Peripheral, Hand, Right Updated: 06/12/24 0703     Blood Culture, Routine No Growth after 4 days.    Blood culture x two cultures. Draw prior to antibiotics. [[id number]] Collected: 06/07/24 1124    Order Status: Completed Specimen: Blood from Peripheral, Antecubital, Left Updated: 06/11/24 1303     Blood Culture, Routine No Growth after 4 days.    Narrative:      Aerobic and anaerobic    Blood culture x two cultures. Draw prior to antibiotics. [[id number]] Collected: 06/07/24 1138    Order Status: Completed Specimen: Blood from Peripheral, Upper Arm, Right Updated: 06/11/24 1303     Blood Culture, Routine No Growth after 4 days.    Narrative:      Aerobic and anaerobic    Cryptococcal antigen, CSF [[id number]] Collected: 06/08/24 1247    Order Status: Completed Specimen: CSF (Spinal Fluid) from CSF Tap, Tube 1 Updated: 06/09/24 1722     Crypto Ag, CSF Negative    Respiratory Infection Panel (PCR), Nasopharyngeal [[id number]] Collected: 06/07/24 2222    Order Status: Completed Specimen: Nasopharyngeal Swab Updated: 06/08/24 2058     Respiratory Infection Panel Source NP Swab     Adenovirus Not Detected     Coronavirus 229E, Common Cold Virus Not Detected     Coronavirus HKU1, Common Cold Virus Not Detected     Coronavirus NL63,  Common Cold Virus Not Detected     Coronavirus OC43, Common Cold Virus Not Detected     Comment: The Coronavirus strains detected in this test cause the common cold.  These strains are not the COVID-19 (novel Coronavirus)strain   associated with the respiratory disease outbreak.          SARS-CoV2 (COVID-19) Qualitative PCR Not Detected     Human Metapneumovirus Not Detected     Human Rhinovirus/Enterovirus Not Detected     Influenza A (subtypes H1, H1-2009,H3) Not Detected     Influenza B Not Detected     Parainfluenza Virus 1 Not Detected     Parainfluenza Virus 2 Not Detected     Parainfluenza Virus 3 Not Detected     Parainfluenza Virus 4 Not Detected     Respiratory Syncytial Virus Not Detected     Bordetella Parapertussis ([id number]) Not Detected     Bordetella pertussis (ptxP) Not Detected     Chlamydia pneumoniae Not Detected     Mycoplasma pneumoniae Not Detected    Narrative:      Assay not valid for lower respiratory specimens, alternate  testing required.    Fungus culture [[id number]] Collected: 06/08/24 1247    Order Status: Sent Specimen: CSF (Spinal Fluid) from CSF Tap, Tube 1 Updated: 06/08/24 1312    Gram stain [[id number]] Collected: 06/08/24 1247    Order Status: Canceled Specimen: CSF (Spinal Fluid) from CSF Tap, Tube 1     Respiratory Infection Panel (PCR), Nasopharyngeal [[id number]]     Order Status: Canceled Specimen: Nasopharyngeal Swab     Culture, Anaerobe [[id number]]     Order Status: Canceled Specimen: CSF (Spinal Fluid)     Aerobic culture [[id number]]     Order Status: Canceled Specimen: CSF (Spinal Fluid)     Blood culture x two cultures. Draw prior to antibiotics. [[id number]]     Order Status: Canceled Specimen: Blood     Blood culture x two cultures. Draw prior to antibiotics. [[id number]]     Order Status: Canceled Specimen: Blood             Significant Imaging: I have reviewed all pertinent imaging results/findings within the past 24 hours.

## 2024-06-12 NOTE — CONSULTS
78y/o female with medical history of HTN, HLD, DM with admission concerns for weakness / fevers / encephalopathy. DDx of meningitis - with LP revealing cloudy csf (wbc 500/ lymp / prtn 223/ gluc 100). Specific CSF studies unrevealing for any bacterial etiology - and have down titrated off Abx coverage. Off ampho coverage. On antivirals - pending complete studies. Persistent encephalopathy - and neurology re-consulted for further evaluation.     Exam: drowsy, yet wakens to verbal stimuli. Oriented to person place and month, follows commands, no focal motor or sensory or cranial nerve deficits.    Interval MRI brain wo contrast - negative for acute findings   Interval EEG - negative for epileptiform foci / no concerns for clinical seizures.     Recs:  Encephalopathy - suspect in the spectrum of infectious meningitis spectrum / Low suspicion for focal cerebrovascular ischemic event or epileptic or autoimmune inflammatory etiology.      - Overall improvement in trends / can discontinue keppra for seizure prophylaxis / Suspect improvement with control of source   - can consider repeat LP for further evaluation / prior studies - needs consideration for fungal / TB - mycobacterial etiologies. Also can consider cytology and encephalitis panel for any non infectious etiology evaluation event low suspicion   - appreciate ID inputs on the same     Encephalopathy and management  -- Correct lytes as needed  / avoid hypoxia or hypercapnia / avoid hypoglycemia / control uremia - avoid rapid correction / avoid-correct metabolic-respiratory acidosis or alkalosis   -- Correct any nutritional deficiencies / correct anemia / provide nutritional support as appropriate / ambulate when appropriate   -- PT/OT evaluation and management   -- delirium precautions            Christiano Moran MD    General Neurology, [facility],   [address] [facility], [address], [place] [address]    Vascular Neurology, Endovascular  Neurosurgery,   [facility], [address] [place] [address]

## 2024-06-13 LAB
ALBUMIN SERPL BCP-MCNC: 2.5 G/DL (ref 3.5–5.2)
ALP SERPL-CCNC: 43 U/L (ref 55–135)
ALT SERPL W/O P-5'-P-CCNC: 26 U/L (ref 10–44)
ANION GAP SERPL CALC-SCNC: 6 MMOL/L (ref 8–16)
AST SERPL-CCNC: 21 U/L (ref 10–40)
BASOPHILS # BLD AUTO: 0 K/UL (ref 0–0.2)
BASOPHILS NFR BLD: 0 % (ref 0–1.9)
BILIRUB SERPL-MCNC: 0.7 MG/DL (ref 0.1–1)
BILIRUB UR QL STRIP: NEGATIVE
BUN SERPL-MCNC: 12 MG/DL (ref 8–23)
CALCIUM SERPL-MCNC: 7.9 MG/DL (ref 8.7–10.5)
CHLORIDE SERPL-SCNC: 102 MMOL/L (ref 95–110)
CLARITY UR: CLEAR
CO2 SERPL-SCNC: 27 MMOL/L (ref 23–29)
COLOR UR: YELLOW
CREAT SERPL-MCNC: 0.8 MG/DL (ref 0.5–1.4)
CRP SERPL-MCNC: 3.2 MG/L (ref 0–8.2)
DIFFERENTIAL METHOD BLD: NORMAL
EOSINOPHIL # BLD AUTO: 0 K/UL (ref 0–0.5)
EOSINOPHIL NFR BLD: 0.2 % (ref 0–8)
ERYTHROCYTE [DISTWIDTH] IN BLOOD BY AUTOMATED COUNT: 12.9 % (ref 11.5–14.5)
ERYTHROCYTE [SEDIMENTATION RATE] IN BLOOD BY PHOTOMETRIC METHOD: 65 MM/HR (ref 0–36)
EST. GFR  (NO RACE VARIABLE): >60 ML/MIN/1.73 M^2
GLUCOSE SERPL-MCNC: 98 MG/DL (ref 70–110)
GLUCOSE UR QL STRIP: NEGATIVE
HCT VFR BLD AUTO: 37.3 % (ref 37–48.5)
HGB BLD-MCNC: 12.3 G/DL (ref 12–16)
HGB UR QL STRIP: NEGATIVE
IMM GRANULOCYTES # BLD AUTO: 0.02 K/UL (ref 0–0.04)
IMM GRANULOCYTES NFR BLD AUTO: 0.3 % (ref 0–0.5)
KETONES UR QL STRIP: ABNORMAL
LEUKOCYTE ESTERASE UR QL STRIP: NEGATIVE
LYMPHOCYTES # BLD AUTO: 1.9 K/UL (ref 1–4.8)
LYMPHOCYTES NFR BLD: 30.7 % (ref 18–48)
MCH RBC QN AUTO: 28.1 PG (ref 27–31)
MCHC RBC AUTO-ENTMCNC: 33 G/DL (ref 32–36)
MCV RBC AUTO: 85 FL (ref 82–98)
MONOCYTES # BLD AUTO: 0.9 K/UL (ref 0.3–1)
MONOCYTES NFR BLD: 14.9 % (ref 4–15)
NEUTROPHILS # BLD AUTO: 3.3 K/UL (ref 1.8–7.7)
NEUTROPHILS NFR BLD: 53.9 % (ref 38–73)
NITRITE UR QL STRIP: NEGATIVE
NRBC BLD-RTO: 0 /100 WBC
PH UR STRIP: 7 [PH] (ref 5–8)
PLATELET # BLD AUTO: 213 K/UL (ref 150–450)
PMV BLD AUTO: 10.2 FL (ref 9.2–12.9)
POCT GLUCOSE: 113 MG/DL (ref 70–110)
POCT GLUCOSE: 116 MG/DL (ref 70–110)
POCT GLUCOSE: 96 MG/DL (ref 70–110)
POCT GLUCOSE: 97 MG/DL (ref 70–110)
POTASSIUM SERPL-SCNC: 3.1 MMOL/L (ref 3.5–5.1)
PROT SERPL-MCNC: 6 G/DL (ref 6–8.4)
PROT UR QL STRIP: NEGATIVE
RBC # BLD AUTO: 4.38 M/UL (ref 4–5.4)
SODIUM SERPL-SCNC: 135 MMOL/L (ref 136–145)
SP GR UR STRIP: 1.01 (ref 1–1.03)
URN SPEC COLLECT METH UR: ABNORMAL
UROBILINOGEN UR STRIP-ACNC: NEGATIVE EU/DL
WBC # BLD AUTO: 6.12 K/UL (ref 3.9–12.7)
WNV RNA CSF QL NAA+PROBE: NEGATIVE

## 2024-06-13 PROCEDURE — 36415 COLL VENOUS BLD VENIPUNCTURE: CPT | Performed by: STUDENT IN AN ORGANIZED HEALTH CARE EDUCATION/TRAINING PROGRAM

## 2024-06-13 PROCEDURE — 86160 COMPLEMENT ANTIGEN: CPT | Mod: 59 | Performed by: STUDENT IN AN ORGANIZED HEALTH CARE EDUCATION/TRAINING PROGRAM

## 2024-06-13 PROCEDURE — 86140 C-REACTIVE PROTEIN: CPT | Performed by: STUDENT IN AN ORGANIZED HEALTH CARE EDUCATION/TRAINING PROGRAM

## 2024-06-13 PROCEDURE — 63600175 PHARM REV CODE 636 W HCPCS: Performed by: STUDENT IN AN ORGANIZED HEALTH CARE EDUCATION/TRAINING PROGRAM

## 2024-06-13 PROCEDURE — 25000003 PHARM REV CODE 250: Performed by: STUDENT IN AN ORGANIZED HEALTH CARE EDUCATION/TRAINING PROGRAM

## 2024-06-13 PROCEDURE — 86038 ANTINUCLEAR ANTIBODIES: CPT | Performed by: STUDENT IN AN ORGANIZED HEALTH CARE EDUCATION/TRAINING PROGRAM

## 2024-06-13 PROCEDURE — 97535 SELF CARE MNGMENT TRAINING: CPT

## 2024-06-13 PROCEDURE — 11000001 HC ACUTE MED/SURG PRIVATE ROOM

## 2024-06-13 PROCEDURE — A4216 STERILE WATER/SALINE, 10 ML: HCPCS | Performed by: STUDENT IN AN ORGANIZED HEALTH CARE EDUCATION/TRAINING PROGRAM

## 2024-06-13 PROCEDURE — 85652 RBC SED RATE AUTOMATED: CPT | Performed by: STUDENT IN AN ORGANIZED HEALTH CARE EDUCATION/TRAINING PROGRAM

## 2024-06-13 PROCEDURE — 99233 SBSQ HOSP IP/OBS HIGH 50: CPT | Mod: ,,, | Performed by: STUDENT IN AN ORGANIZED HEALTH CARE EDUCATION/TRAINING PROGRAM

## 2024-06-13 PROCEDURE — 27000207 HC ISOLATION

## 2024-06-13 PROCEDURE — 86160 COMPLEMENT ANTIGEN: CPT | Performed by: STUDENT IN AN ORGANIZED HEALTH CARE EDUCATION/TRAINING PROGRAM

## 2024-06-13 PROCEDURE — 86036 ANCA SCREEN EACH ANTIBODY: CPT | Mod: 59 | Performed by: STUDENT IN AN ORGANIZED HEALTH CARE EDUCATION/TRAINING PROGRAM

## 2024-06-13 PROCEDURE — 85025 COMPLETE CBC W/AUTO DIFF WBC: CPT | Performed by: STUDENT IN AN ORGANIZED HEALTH CARE EDUCATION/TRAINING PROGRAM

## 2024-06-13 PROCEDURE — 25000003 PHARM REV CODE 250: Performed by: INTERNAL MEDICINE

## 2024-06-13 PROCEDURE — 81003 URINALYSIS AUTO W/O SCOPE: CPT | Performed by: STUDENT IN AN ORGANIZED HEALTH CARE EDUCATION/TRAINING PROGRAM

## 2024-06-13 PROCEDURE — 97530 THERAPEUTIC ACTIVITIES: CPT

## 2024-06-13 PROCEDURE — 25500020 PHARM REV CODE 255: Performed by: STUDENT IN AN ORGANIZED HEALTH CARE EDUCATION/TRAINING PROGRAM

## 2024-06-13 PROCEDURE — 80053 COMPREHEN METABOLIC PANEL: CPT | Performed by: STUDENT IN AN ORGANIZED HEALTH CARE EDUCATION/TRAINING PROGRAM

## 2024-06-13 RX ORDER — LISINOPRIL 20 MG/1
40 TABLET ORAL DAILY
Status: DISCONTINUED | OUTPATIENT
Start: 2024-06-14 | End: 2024-06-16

## 2024-06-13 RX ORDER — AMLODIPINE BESYLATE 5 MG/1
10 TABLET ORAL DAILY
Status: DISCONTINUED | OUTPATIENT
Start: 2024-06-13 | End: 2024-06-17 | Stop reason: HOSPADM

## 2024-06-13 RX ORDER — POTASSIUM CHLORIDE 20 MEQ/1
60 TABLET, EXTENDED RELEASE ORAL ONCE
Status: COMPLETED | OUTPATIENT
Start: 2024-06-13 | End: 2024-06-13

## 2024-06-13 RX ADMIN — INSULIN ASPART 5 UNITS: 100 INJECTION, SOLUTION INTRAVENOUS; SUBCUTANEOUS at 08:06

## 2024-06-13 RX ADMIN — ACETAMINOPHEN 650 MG: 325 TABLET ORAL at 08:06

## 2024-06-13 RX ADMIN — ACYCLOVIR SODIUM 570 MG: 50 INJECTION, SOLUTION INTRAVENOUS at 09:06

## 2024-06-13 RX ADMIN — INSULIN ASPART 5 UNITS: 100 INJECTION, SOLUTION INTRAVENOUS; SUBCUTANEOUS at 04:06

## 2024-06-13 RX ADMIN — SODIUM CHLORIDE: 9 INJECTION, SOLUTION INTRAVENOUS at 09:06

## 2024-06-13 RX ADMIN — IOHEXOL 100 ML: 350 INJECTION, SOLUTION INTRAVENOUS at 05:06

## 2024-06-13 RX ADMIN — IOHEXOL 15 ML: 300 INJECTION, SOLUTION INTRAVENOUS at 05:06

## 2024-06-13 RX ADMIN — ACYCLOVIR SODIUM 570 MG: 50 INJECTION, SOLUTION INTRAVENOUS at 12:06

## 2024-06-13 RX ADMIN — Medication 10 ML: at 12:06

## 2024-06-13 RX ADMIN — LISINOPRIL 20 MG: 20 TABLET ORAL at 08:06

## 2024-06-13 RX ADMIN — INSULIN ASPART 5 UNITS: 100 INJECTION, SOLUTION INTRAVENOUS; SUBCUTANEOUS at 11:06

## 2024-06-13 RX ADMIN — ENOXAPARIN SODIUM 40 MG: 40 INJECTION SUBCUTANEOUS at 04:06

## 2024-06-13 RX ADMIN — MUPIROCIN: 20 OINTMENT TOPICAL at 08:06

## 2024-06-13 RX ADMIN — ACETAMINOPHEN 650 MG: 325 TABLET ORAL at 04:06

## 2024-06-13 RX ADMIN — CETIRIZINE HYDROCHLORIDE 10 MG: 10 TABLET, FILM COATED ORAL at 08:06

## 2024-06-13 RX ADMIN — METOPROLOL SUCCINATE 100 MG: 50 TABLET, EXTENDED RELEASE ORAL at 08:06

## 2024-06-13 RX ADMIN — ACYCLOVIR SODIUM 570 MG: 50 INJECTION, SOLUTION INTRAVENOUS at 05:06

## 2024-06-13 RX ADMIN — AMLODIPINE BESYLATE 10 MG: 5 TABLET ORAL at 04:06

## 2024-06-13 RX ADMIN — Medication 10 ML: at 06:06

## 2024-06-13 RX ADMIN — POTASSIUM CHLORIDE 60 MEQ: 1500 TABLET, EXTENDED RELEASE ORAL at 08:06

## 2024-06-13 RX ADMIN — Medication 10 ML: at 01:06

## 2024-06-13 RX ADMIN — Medication 10 ML: at 05:06

## 2024-06-13 NOTE — ASSESSMENT & PLAN NOTE
- Sepsis with b/l hydronephrosis, unclear origin of infection as urine is clean. No skin cellulitis or wounds. No oral/dental infx. No PNA. IV abx and robison placed. 103F despite V+Z, Stiff neck, shaking, Alterned mental status, Alert but not following any   commands, not tracking staring blank. Neurogenic bladder w Hydroneph- no bacteria or WBC in urine.     - Starting meningitis antibiotics. Meningitis meds started. Move to ICU. IR consult for LP. EEG ordered. ID and Neuro consult.   - LP performed on 6/8 --> Cell count/diff with 560 WBC, predominantly lymphocytes with elevated protein and glucose  - on broad spectrum meningitis antibiotics while cultures/labs return  - mental status is improving with current regimen  - appreciate ID recommendations  - EEG  suggestive of mild diffuse cerebral dysfunction. No epileptiform activity or electrographic seizures seen.  -MRI revealed no acute intracranial abnormality.  MRI L-spine with degenerative disc disease.  -de-escalated to acyclovir alone per ID recommendations.  Plan for repeat LP if no improved

## 2024-06-13 NOTE — ASSESSMENT & PLAN NOTE
Sussy Fulton is a 79 year old woman who was admitted for weakness, malaise and fevers x 1 day.  UA bland, no leukocytosis, CTH wnl, CT a/p with hydroureteronephrosis (no obstructing stones s/p robison) otherwise unremarkable. UA neg x 2. Noted to have neck pain and worsening encephalopathy concerning for meningitis. Broadened to vanc, ceftriaxone, ampicillin and acyclovir. LP revealed cloudy csf (wbc 500/ lymp / prtn 223/ gluc 100). Ampho started due to concern for fungal cns infection. Recent URI may be source of cns infection, LP results more consistent with viral etiology. CSF cultures and crypto antigen negative. CSF encephalitis/meningitis PCR panel negative, HSV 1/2 PCR negative, VZV negative. HIV neg. EEG w/o seizures. RIP neg. Mental status appears to be waxing and waning which could suggest element of hospital delirium. Viral encephalitis high on differential, but would consider other non-infectious causes given stalled improvement. Ceftriaxone could be causing some neurotoxicity, so stopped given negative bacterial meningitis work up.     Recommendations  - will follow repeat blood cultures   - if she has recurrence of fever would evaluate for hospital acquired infection as well including repeat blood cultures and chest imaging  - would continue acyclovir meningitis dosing for now  - Karius and West Nile pending  - would consider repeat LP with cell counts, protein, glucose, HSV PCR, cytology, other work up per neurology consult   - if she continues to have headache and neck pain, would consider repeat imaging

## 2024-06-13 NOTE — NURSING
[facility] MEWS TRIGGER FOLLOW UP       MEWS Monitoring, Score is: 5  Indication for review: BP, Temp    BP and Temp treated with medications prior to round.     Bedside Nurse, Jane contacted, no concerns verbalized at this time, instructed to call [phone] for further concerns or assistance..

## 2024-06-13 NOTE — NURSING
[facility] [place]  Nurses Note -- 4 Eyes      6/13/2024       Skin assessed on: Q Shift      [x] No Pressure Injuries Present    [x]Prevention Measures Documented    [] Yes LDA  for Pressure Injury Previously documented     [] Yes New Pressure Injury Discovered   [] LDA for New Pressure Injury Added      Attending RN:  Yg Taylor RN     Second RN:  ANIRUDH Gould

## 2024-06-13 NOTE — SUBJECTIVE & OBJECTIVE
Interval History:  T-max of 102.2° F overnight.  Urinalysis negative.  Blood cultures no growth to date.  Id plans for repeat LP if no improvement    Review of Systems   Constitutional: Negative.    Respiratory: Negative.     Cardiovascular: Negative.    Gastrointestinal: Negative.    Genitourinary: Negative.    Musculoskeletal: Negative.    Neurological:  Positive for weakness and headaches.     Objective:     Vital Signs (Most Recent):  Temp: 98.9 °F (37.2 °C) (06/13/24 1205)  Pulse: 77 (06/13/24 1205)  Resp: 18 (06/13/24 1205)  BP: (!) 141/82 (06/13/24 1205)  SpO2: 100 % (06/13/24 1205) Vital Signs (24h Range):  Temp:  [98.9 °F (37.2 °C)-102.2 °F (39 °C)] 98.9 °F (37.2 °C)  Pulse:  [] 77  Resp:  [18-20] 18  SpO2:  [92 %-100 %] 100 %  BP: (136-190)/(73-89) 141/82     Weight: 79 kg (174 lb 2.6 oz)  Body mass index is 28.98 kg/m².    Intake/Output Summary (Last 24 hours) at 6/13/2024 1424  Last data filed at 6/13/2024 0100  Gross per 24 hour   Intake 240 ml   Output 601 ml   Net -361 ml         Physical Exam  Constitutional:       General: She is not in acute distress.     Appearance: She is normal weight.   Cardiovascular:      Rate and Rhythm: Normal rate.      Pulses: Normal pulses.   Pulmonary:      Effort: No respiratory distress.      Breath sounds: Normal breath sounds. No wheezing.   Abdominal:      General: Bowel sounds are normal. There is no distension.      Palpations: Abdomen is soft.      Tenderness: There is no abdominal tenderness.   Musculoskeletal:      Right lower leg: No edema.      Left lower leg: No edema.   Skin:     General: Skin is warm.   Neurological:      Mental Status: She is alert and oriented to person, place, and time. Mental status is at baseline.   Psychiatric:         Mood and Affect: Mood normal.             Significant Labs: All pertinent labs within the past 24 hours have been reviewed.    Significant Imaging: I have reviewed all pertinent imaging results/findings within  the past 24 hours.

## 2024-06-13 NOTE — PROGRESS NOTES
[facility] - Telemetry  Infectious Disease  Progress Note    Patient Name: Sussy Fulton  MRN: [id number]  Admission Date: 6/7/2024  Length of Stay: 6 days  Attending Physician: Sofia Blanchard,*  Primary Care Provider: Arlen Rivera MD    Isolation Status: Droplet  Assessment/Plan:      Other  Fever, unknown origin  Sussy Fulton is a 79 year old woman who was admitted for weakness, malaise and fevers x 1 day.  UA bland, no leukocytosis, CTH wnl, CT a/p with hydroureteronephrosis (no obstructing stones s/p robison) otherwise unremarkable. UA neg x 2. Noted to have neck pain and worsening encephalopathy concerning for meningitis. Broadened to vanc, ceftriaxone, ampicillin and acyclovir. LP revealed cloudy csf (wbc 500/ lymp / prtn 223/ gluc 100). Ampho started due to concern for fungal cns infection. Recent URI may be source of cns infection, LP results more consistent with viral etiology. CSF cultures and crypto antigen negative. CSF encephalitis/meningitis PCR panel negative, HSV 1/2 PCR negative, VZV negative. HIV neg. EEG w/o seizures. RIP neg. Mental status appears to be waxing and waning which could suggest element of hospital delirium. Viral encephalitis high on differential, but would consider other non-infectious causes given stalled improvement. Ceftriaxone could be causing some neurotoxicity, so stopped given negative bacterial meningitis work up.     Recommendations  - will follow repeat blood cultures   - if she has recurrence of fever would evaluate for hospital acquired infection as well including repeat blood cultures and chest imaging  - would continue acyclovir meningitis dosing for now  - Karius and West Nile pending  - would consider repeat LP with cell counts, protein, glucose, HSV PCR, cytology, other work up per neurology consult   - if she continues to have headache and neck pain, would consider repeat imaging       Above discussed with primary team.     Time: 50 minutes   50% of  "time spent on face-to-face counseling and coordination of care. Counseling included review of test results, diagnosis, and treatment plan with patient and/or family.  I have reviewed hospital notes from HM service and other specialty providers. I have also reviewed CBC, CMP/BMP,  cultures and imaging with my interpretation as documented. Patient is high risk of morbidity, on antibiotics requiring intensive monitoring for toxicity.     Anticipated Disposition: TBD    Thank you for your consult. I will follow-up with patient. Please contact us if you have any additional questions.    Marla Becerril MD  Infectious Disease  [facility] - Telemetry    Subjective:     Principal Problem:Meningoencephalitis    HPI: 78y/o F pt w hx of HTN, HLD, T2DM  presented to the ER with family due to weakness x1 day and not feeling well and was admitted 6/7 for presumed sepsis with unclear source.    Pt denied nausea, vomiting, diarrhea, shortness breath or cough.  States that she started feeling bad after eating Chinese food with her family last night.  Denies sick contacts.      In the ED  pt was febrile to 103°.  Labs remarkable for wbc 11.8, [place] wnl, UA bland,  COVID neg.  Started on empiric vanc and Zosyn. CT head unremarkable. CT of the abd/pel revealed: "Bilateral hydroureteronephrosis and moderate urinary bladder distension.  No nephrolithiasis.  Findings may be related to obstructive uropathy." Urology consulted and recommended robison placement and continued IV antibiotics.  Culture sent from the Robison placed and pending.    Hospital course c/b fevers despite bs abx and worsening acute encephalopathy prompting transfer to icu and broadening of abx to vacn, ceftriaxone, ampicillin and acyclovir for meningitis coverage.    ID consulted for: "103F despite V+Z, Stiff neck, shaking, Alterned mental status, Alert but not following any commands, not tracking staring blank. ?neuorgenic bladder w Hydroneph- no bacterio or WBC in urine " ""    Pt reports recent sinusitis 1 wk prior for which she received a steroid injection and albuterol. Notes new b/l lower back pain on day of presentation, but otherwise no new symptoms. Denies sick contacts, animal exposures, recent travel or recent surgeries/ dental procedures. Does not work. Lives with her  who is a dialysis pt and her son. Denies having eaten any unpasteurized dairy products. Ate chinese food tues, but nobody else got sick.   Interval History: Febrile this morning. She denies any new symptoms. Up with occupational therapy, washing hands. Walked with some assistance. Continues to report headache and neck pain. Report of patient becoming disoriented overnight and requiring redirection.     Review of Systems   Constitutional:  Positive for fatigue. Negative for fever.   Respiratory: Negative.     Cardiovascular: Negative.    Gastrointestinal: Negative.    Musculoskeletal:  Positive for neck pain.   Skin: Negative.    Neurological:  Positive for headaches.     Objective:     Vital Signs (Most Recent):  Temp: 98.9 °F (37.2 °C) (06/13/24 1205)  Pulse: 77 (06/13/24 1205)  Resp: 18 (06/13/24 1205)  BP: (!) 141/82 (06/13/24 1205)  SpO2: 100 % (06/13/24 1205) Vital Signs (24h Range):  Temp:  [98.9 °F (37.2 °C)-102.2 °F (39 °C)] 98.9 °F (37.2 °C)  Pulse:  [] 77  Resp:  [18-20] 18  SpO2:  [92 %-100 %] 100 %  BP: (136-190)/(73-89) 141/82     Weight: 79 kg (174 lb 2.6 oz)  Body mass index is 28.98 kg/m².    Estimated Creatinine Clearance: 61.2 mL/min (based on SCr of 0.8 mg/dL).     Physical Exam  Vitals and nursing note reviewed.   Constitutional:       Appearance: She is not ill-appearing or toxic-appearing.   HENT:      Head: Normocephalic.   Pulmonary:      Effort: Pulmonary effort is normal. No respiratory distress.   Abdominal:      General: There is no distension.      Palpations: Abdomen is soft.   Musculoskeletal:      Cervical back: Tenderness present. No rigidity.   Lymphadenopathy:    "   Cervical: No cervical adenopathy.   Skin:     General: Skin is warm and dry.   Neurological:      Mental Status: She is alert.          Significant Labs:   Microbiology Results (last 7 days)       Procedure Component Value Units Date/Time    Blood culture [[id number]] Collected: 06/12/24 1553    Order Status: Completed Specimen: Blood from Peripheral, Forearm, Left Updated: 06/12/24 2312     Blood Culture, Routine No Growth to date    Blood culture [[id number]] Collected: 06/12/24 1553    Order Status: Completed Specimen: Blood from Peripheral, Wrist, Left Updated: 06/12/24 2312     Blood Culture, Routine No Growth to date    CSF culture [[id number]] Collected: 06/08/24 1247    Order Status: Completed Specimen: CSF (Spinal Fluid) from CSF Tap, Tube 1 Updated: 06/12/24 0735     CSF CULTURE No Growth     Gram Stain Result Cytospin indicates:      Many WBC's      No organisms seen    Blood culture [[id number]] Collected: 06/08/24 0542    Order Status: Completed Specimen: Blood from Peripheral, Hand, Right Updated: 06/12/24 0703     Blood Culture, Routine No Growth after 4 days.    Blood culture x two cultures. Draw prior to antibiotics. [[id number]] Collected: 06/07/24 1124    Order Status: Completed Specimen: Blood from Peripheral, Antecubital, Left Updated: 06/11/24 1303     Blood Culture, Routine No Growth after 4 days.    Narrative:      Aerobic and anaerobic    Blood culture x two cultures. Draw prior to antibiotics. [[id number]] Collected: 06/07/24 1138    Order Status: Completed Specimen: Blood from Peripheral, Upper Arm, Right Updated: 06/11/24 1303     Blood Culture, Routine No Growth after 4 days.    Narrative:      Aerobic and anaerobic    Cryptococcal antigen, CSF [[id number]] Collected: 06/08/24 1247    Order Status: Completed Specimen: CSF (Spinal Fluid) from CSF Tap, Tube 1 Updated: 06/09/24 1722     Crypto Ag, CSF Negative    Respiratory Infection Panel (PCR), Nasopharyngeal [[id number]] Collected:  06/07/24 2222    Order Status: Completed Specimen: Nasopharyngeal Swab Updated: 06/08/24 2058     Respiratory Infection Panel Source NP Swab     Adenovirus Not Detected     Coronavirus 229E, Common Cold Virus Not Detected     Coronavirus HKU1, Common Cold Virus Not Detected     Coronavirus NL63, Common Cold Virus Not Detected     Coronavirus OC43, Common Cold Virus Not Detected     Comment: The Coronavirus strains detected in this test cause the common cold.  These strains are not the COVID-19 (novel Coronavirus)strain   associated with the respiratory disease outbreak.          SARS-CoV2 (COVID-19) Qualitative PCR Not Detected     Human Metapneumovirus Not Detected     Human Rhinovirus/Enterovirus Not Detected     Influenza A (subtypes H1, H1-2009,H3) Not Detected     Influenza B Not Detected     Parainfluenza Virus 1 Not Detected     Parainfluenza Virus 2 Not Detected     Parainfluenza Virus 3 Not Detected     Parainfluenza Virus 4 Not Detected     Respiratory Syncytial Virus Not Detected     Bordetella Parapertussis ([id number]) Not Detected     Bordetella pertussis (ptxP) Not Detected     Chlamydia pneumoniae Not Detected     Mycoplasma pneumoniae Not Detected    Narrative:      Assay not valid for lower respiratory specimens, alternate  testing required.    Fungus culture [[id number]] Collected: 06/08/24 1247    Order Status: Sent Specimen: CSF (Spinal Fluid) from CSF Tap, Tube 1 Updated: 06/08/24 1312    Gram stain [[id number]] Collected: 06/08/24 1247    Order Status: Canceled Specimen: CSF (Spinal Fluid) from CSF Tap, Tube 1     Respiratory Infection Panel (PCR), Nasopharyngeal [[id number]]     Order Status: Canceled Specimen: Nasopharyngeal Swab     Culture, Anaerobe [[id number]]     Order Status: Canceled Specimen: CSF (Spinal Fluid)     Aerobic culture [[id number]]     Order Status: Canceled Specimen: CSF (Spinal Fluid)     Blood culture x two cultures. Draw prior to antibiotics. [[id number]]     Order  Status: Canceled Specimen: Blood     Blood culture x two cultures. Draw prior to antibiotics. [[id number]]     Order Status: Canceled Specimen: Blood             Significant Imaging: I have reviewed all pertinent imaging results/findings within the past 24 hours.

## 2024-06-13 NOTE — SUBJECTIVE & OBJECTIVE
Interval History: Febrile this morning. She denies any new symptoms. Up with occupational therapy, washing hands. Walked with some assistance. Continues to report headache and neck pain. Report of patient becoming disoriented overnight and requiring redirection.     Review of Systems   Constitutional:  Positive for fatigue. Negative for fever.   Respiratory: Negative.     Cardiovascular: Negative.    Gastrointestinal: Negative.    Musculoskeletal:  Positive for neck pain.   Skin: Negative.    Neurological:  Positive for headaches.     Objective:     Vital Signs (Most Recent):  Temp: 98.9 °F (37.2 °C) (06/13/24 1205)  Pulse: 77 (06/13/24 1205)  Resp: 18 (06/13/24 1205)  BP: (!) 141/82 (06/13/24 1205)  SpO2: 100 % (06/13/24 1205) Vital Signs (24h Range):  Temp:  [98.9 °F (37.2 °C)-102.2 °F (39 °C)] 98.9 °F (37.2 °C)  Pulse:  [] 77  Resp:  [18-20] 18  SpO2:  [92 %-100 %] 100 %  BP: (136-190)/(73-89) 141/82     Weight: 79 kg (174 lb 2.6 oz)  Body mass index is 28.98 kg/m².    Estimated Creatinine Clearance: 61.2 mL/min (based on SCr of 0.8 mg/dL).     Physical Exam  Vitals and nursing note reviewed.   Constitutional:       Appearance: She is not ill-appearing or toxic-appearing.   HENT:      Head: Normocephalic.   Pulmonary:      Effort: Pulmonary effort is normal. No respiratory distress.   Abdominal:      General: There is no distension.      Palpations: Abdomen is soft.   Musculoskeletal:      Cervical back: Tenderness present. No rigidity.   Lymphadenopathy:      Cervical: No cervical adenopathy.   Skin:     General: Skin is warm and dry.   Neurological:      Mental Status: She is alert.          Significant Labs:   Microbiology Results (last 7 days)       Procedure Component Value Units Date/Time    Blood culture [[id number]] Collected: 06/12/24 6087    Order Status: Completed Specimen: Blood from Peripheral, Forearm, Left Updated: 06/12/24 8317     Blood Culture, Routine No Growth to date    Blood culture  [[id number]] Collected: 06/12/24 1553    Order Status: Completed Specimen: Blood from Peripheral, Wrist, Left Updated: 06/12/24 2312     Blood Culture, Routine No Growth to date    CSF culture [[id number]] Collected: 06/08/24 1247    Order Status: Completed Specimen: CSF (Spinal Fluid) from CSF Tap, Tube 1 Updated: 06/12/24 0735     CSF CULTURE No Growth     Gram Stain Result Cytospin indicates:      Many WBC's      No organisms seen    Blood culture [[id number]] Collected: 06/08/24 0542    Order Status: Completed Specimen: Blood from Peripheral, Hand, Right Updated: 06/12/24 0703     Blood Culture, Routine No Growth after 4 days.    Blood culture x two cultures. Draw prior to antibiotics. [[id number]] Collected: 06/07/24 1124    Order Status: Completed Specimen: Blood from Peripheral, Antecubital, Left Updated: 06/11/24 1303     Blood Culture, Routine No Growth after 4 days.    Narrative:      Aerobic and anaerobic    Blood culture x two cultures. Draw prior to antibiotics. [[id number]] Collected: 06/07/24 1138    Order Status: Completed Specimen: Blood from Peripheral, Upper Arm, Right Updated: 06/11/24 1303     Blood Culture, Routine No Growth after 4 days.    Narrative:      Aerobic and anaerobic    Cryptococcal antigen, CSF [[id number]] Collected: 06/08/24 1247    Order Status: Completed Specimen: CSF (Spinal Fluid) from CSF Tap, Tube 1 Updated: 06/09/24 1722     Crypto Ag, CSF Negative    Respiratory Infection Panel (PCR), Nasopharyngeal [[id number]] Collected: 06/07/24 2222    Order Status: Completed Specimen: Nasopharyngeal Swab Updated: 06/08/24 2058     Respiratory Infection Panel Source NP Swab     Adenovirus Not Detected     Coronavirus 229E, Common Cold Virus Not Detected     Coronavirus HKU1, Common Cold Virus Not Detected     Coronavirus NL63, Common Cold Virus Not Detected     Coronavirus OC43, Common Cold Virus Not Detected     Comment: The Coronavirus strains detected in this test cause the common  cold.  These strains are not the COVID-19 (novel Coronavirus)strain   associated with the respiratory disease outbreak.          SARS-CoV2 (COVID-19) Qualitative PCR Not Detected     Human Metapneumovirus Not Detected     Human Rhinovirus/Enterovirus Not Detected     Influenza A (subtypes H1, H1-2009,H3) Not Detected     Influenza B Not Detected     Parainfluenza Virus 1 Not Detected     Parainfluenza Virus 2 Not Detected     Parainfluenza Virus 3 Not Detected     Parainfluenza Virus 4 Not Detected     Respiratory Syncytial Virus Not Detected     Bordetella Parapertussis (OT5001) Not Detected     Bordetella pertussis (ptxP) Not Detected     Chlamydia pneumoniae Not Detected     Mycoplasma pneumoniae Not Detected    Narrative:      Assay not valid for lower respiratory specimens, alternate  testing required.    Fungus culture [[id number]] Collected: 06/08/24 1247    Order Status: Sent Specimen: CSF (Spinal Fluid) from CSF Tap, Tube 1 Updated: 06/08/24 1312    Gram stain [[id number]] Collected: 06/08/24 1247    Order Status: Canceled Specimen: CSF (Spinal Fluid) from CSF Tap, Tube 1     Respiratory Infection Panel (PCR), Nasopharyngeal [[id number]]     Order Status: Canceled Specimen: Nasopharyngeal Swab     Culture, Anaerobe [[id number]]     Order Status: Canceled Specimen: CSF (Spinal Fluid)     Aerobic culture [[id number]]     Order Status: Canceled Specimen: CSF (Spinal Fluid)     Blood culture x two cultures. Draw prior to antibiotics. [[id number]]     Order Status: Canceled Specimen: Blood     Blood culture x two cultures. Draw prior to antibiotics. [[id number]]     Order Status: Canceled Specimen: Blood             Significant Imaging: I have reviewed all pertinent imaging results/findings within the past 24 hours.

## 2024-06-13 NOTE — NURSING
[facility]  Nurses Note -- 4 Eyes      6/13/2024       Skin assessed on: Q Shift      [x] No Pressure Injuries Present    [x]Prevention Measures Documented    [] Yes LDA  for Pressure Injury Previously documented     [] Yes New Pressure Injury Discovered   [] LDA for New Pressure Injury Added      Attending RN:  Jane Muñoz RN     Second RN:  Yg Romero RN

## 2024-06-13 NOTE — PROGRESS NOTES
Pharmacist Renal Dose Adjustment Note    Sussy Fulton is a 79 y.o. female being treated with the medication Acyclovir    Patient Data:    Vital Signs (Most Recent):  Temp: (!) 102.2 °F (39 °C) (06/13/24 0829)  Pulse: 84 (06/13/24 1122)  Resp: 18 (06/13/24 0829)  BP: (!) 158/80 (06/13/24 0829)  SpO2: (!) 92 % (06/13/24 1000) Vital Signs (72h Range):  Temp:  [98.3 °F (36.8 °C)-102.2 °F (39 °C)]   Pulse:  []   Resp:  [17-20]   BP: (118-190)/(59-98)   SpO2:  [92 %-100 %]      Recent Labs   Lab 06/11/24  0502 06/12/24  1554 06/13/24  0505   CREATININE 1.0 0.9 0.8     Serum creatinine: 0.8 mg/dL 06/13/24 0505  Estimated creatinine clearance: 61.2 mL/min    Medication:Acyclovir 570mg IV q12hr frequency will be changed to medication:Acyclovir 570mg IV q8hr frequency    Pharmacist's Name: Diana Funk  Pharmacist's Extension: [phone]

## 2024-06-13 NOTE — NURSING
Received handoff report from morning shift. Patient lying on bed AAOx4, no acute distress noted, breathing even and unlabored. Isolation and Safety precautions maintained. Care assumed at this time.

## 2024-06-13 NOTE — PT/OT/SLP PROGRESS
Occupational Therapy   Treatment    Name: Sussy Fulton  MRN: [id number]  Admitting Diagnosis:  Meningoencephalitis       Recommendations:     Discharge Recommendations: Low Intensity Therapy  Discharge Equipment Recommendations:  none  Barriers to discharge:   (generalized weakness, fall risk)    Assessment:     Sussy Fulton is a 79 y.o. female with a medical diagnosis of Meningoencephalitis.   Performance deficits affecting function are weakness, impaired endurance, impaired self care skills, impaired functional mobility, gait instability, impaired balance, decreased upper extremity function, decreased coordination, decreased safety awareness, pain.   The patient participated in OT with c/o headache and neck pain. The patient required CGA to amb with HHA to the toilet and sink and chair. The patient should be encouraged to sit in the chair for meals.   Rehab Prognosis:  Good; patient would benefit from acute skilled OT services to address these deficits and reach maximum level of function.       Plan:     Patient to be seen 2 x/week to address the above listed problems via self-care/home management, therapeutic activities, therapeutic exercises  Plan of Care Expires: 06/25/24  Plan of Care Reviewed with: patient, daughter    Subjective     Chief Complaint: headache   Patient/Family Comments/goals: wants to amb to the toilet  Pain/Comfort:  Pain Rating 1:  (yes-did not rate)  Location 1: neck (headache)  Pain Addressed 1: Distraction, Nurse notified    Objective:     Communicated with: Jane oliveira prior to session.  Patient found HOB elevated with telemetry upon OT entry to room.    General Precautions: Standard, droplet, fall    Orthopedic Precautions:N/A  Braces: N/A  Respiratory Status: Room air     Occupational Performance:     Bed Mobility:    Patient completed Scooting/Bridging with stand by assistance  Patient completed Supine to Sit with stand by assistance     Functional Mobility/Transfers:  Patient  completed Sit <> Stand Transfer with stand by assistance and contact guard assistance  with  hand-held assist   Patient completed Toilet Transfer Step Transfer technique with stand by assistance and contact guard assistance with  grab bars and VC to use grab bars  Functional Mobility: CGA/HHA to amb to the toilet, sink and chair. The patient reached for door frame and wall for stability but refused to use a RW.    Activities of Daily Living:  Feeding:  The patient reports having no appetite and has not been eating    Grooming: stand by assistance to wash hands at the sink  Upper Body Dressing: contact guard assistance    Lower Body Dressing: contact guard assistance to manage underpants during toileting  Toileting: stand by assistance and set up assist to wipe self after having a BM. The patient appeared to have dark stools-MD and RN notified        [facility] 6 Click ADL: 20    Treatment & Education:  Performed self care and functional mobility as noted above  Educated the patient re: need to sit in the chair during the day, especially for meals    Patient left up in chair with all lines intact, call button in reach, nurse notified, and daughter present    GOALS:   Multidisciplinary Problems       Occupational Therapy Goals          Problem: Occupational Therapy    Goal Priority Disciplines Outcome Interventions   Occupational Therapy Goal     OT, PT/OT Progressing    Description: Goals to be met by: 6/25/2024     Patient will increase functional independence with ADLs by performing:    UE Dressing with Modified [place].  LE Dressing with Modified [place].  Grooming while standing at sink with Modified [place] and Assistive Devices as needed.  Toileting from toilet with Modified [place] for hygiene and clothing management.   Supine to sit with Modified [place].  Step transfer with Modified [place]  Toilet transfer to toilet with Modified [place].  Upper extremity exercise program x10  reps per handout, with independence.                         Time Tracking:     OT Date of Treatment: 06/13/24  OT Start Time: 1110  OT Stop Time: 1141  OT Total Time (min): 31 min    Billable Minutes:Self Care/Home Management 15  Therapeutic Activity 16  Total Time 31    OT/DEBBIE: OT          6/13/2024

## 2024-06-13 NOTE — PLAN OF CARE
Problem: Adult Inpatient Plan of Care  Goal: Optimal Comfort and Wellbeing  Intervention: Monitor Pain and Promote Comfort  Flowsheets (Taken 6/13/2024 0416)  Pain Management Interventions:   care clustered   heat applied   pillow support provided   position adjusted   quiet environment facilitated   warm blanket provided     Problem: Fall Injury Risk  Goal: Absence of Fall and Fall-Related Injury  Intervention: Promote Injury-Free Environment  Flowsheets (Taken 6/13/2024 0416)  Safety Promotion/Fall Prevention:   assistive device/personal item within reach   bed alarm set   family to remain at bedside   medications reviewed   room near unit station   /camera at bedside   instructed to call staff for mobility

## 2024-06-13 NOTE — PROGRESS NOTES
[facility] Medicine  Progress Note    Patient Name: Sussy Fulton  MRN: [id number]  Patient Class: IP- Inpatient   Admission Date: 6/7/2024  Length of Stay: 6 days  Attending Physician: Sofia Blanchard,*  Primary Care Provider: Arlen Rivera MD        Subjective:     Principal Problem:Meningoencephalitis        HPI:  79 y.o.  female with a past medical history significant for GERD, hypertension, hyperlipidemia and non-insulin-dependent type 2 diabetes presents to the ER with family due to weakness x1 day and not feeling well.  Patient denies any nausea any vomiting any diarrhea.  Patient denies any shortness breath or cough.  States that she started feeling bad after eating Chinese food with her family last night.  No one else in the family is feeling sick.  While she was in the ER was noted to have a temperature of a 103°.  Labs noted for white count of 11.8.  Lactic was normal.  Troponins were negative.  Urine was negative for any UTI.  COVID was negative chemistry was overall unremarkable.  Cultures were sent and she was started on vanc and Zosyn.  We were consulted for further evaluation and management of fever of unknown origin.  CT head was done and also negative for any mass or bleed.    On exam patient was noted to be tender on her right flank.  CT of the abdomen and pelvis with IV contrast was ordered for possible stone versus pyelo versus abscess.  CT of the abdomen and pelvis noted: Impression: Bilateral hydroureteronephrosis and moderate urinary bladder distension.  No nephrolithiasis.  Findings may be related to obstructive uropathy. Hepatic steatosis. Cholelithiasis and or a small gallbladder sludge.Small hiatal hernia. Consult urology was placed.  I contacted Urology to let them know the consulted to discuss possibilities of an infected stone.  Urology reviewed the report with me and no stone was mentioned.  Recommended Muhammad placement and continued  IV antibiotics.  We will follow up with her.  Culture sent from the [facility] placed and pending.      Overview/Hospital Course:  79-year-old  female with past medical history of hypertension, hyperlipidemia, diabetes, GERD who was admitted for Sepsis likely secondary to meningoencephalitis.  Acute metabolic encephalopathy.  CT with b/l hydronephrosis.  Patient was initiated on empiric therapy for meningitis given stiff neck and AMS and moved to ICU.  ID and Neurology were consulted.  status post IR guided LP with 560 WBC and 75% lymphocytes - concerning for viral or fungal. Elevated Protein 223. On Amphotericin B, vanc, ceftriaxone, ampicillin, acylocivir and dexamethasone. Awaiting further culture data.  DC amphotericin and Decadron per ID recs.  Mental status improving.  MRI negative  EEG revealed diffuse slowing suggestive of mild diffuse cerebral dysfunction. No epileptiform activity or electrographic seizures seen.  VZV VDRL and [place] Nile pending.  Repeat blood cultures and urinalysis with urine culture..  PT/OT on board.  Reconsulted Neurology .  Plans for repeat LP if not much improvement    Interval History:  T-max of 102.2° F overnight.  Urinalysis negative.  Blood cultures no growth to date.  Id plans for repeat LP if no improvement    Review of Systems   Constitutional: Negative.    Respiratory: Negative.     Cardiovascular: Negative.    Gastrointestinal: Negative.    Genitourinary: Negative.    Musculoskeletal: Negative.    Neurological:  Positive for weakness and headaches.     Objective:     Vital Signs (Most Recent):  Temp: 98.9 °F (37.2 °C) (06/13/24 1205)  Pulse: 77 (06/13/24 1205)  Resp: 18 (06/13/24 1205)  BP: (!) 141/82 (06/13/24 1205)  SpO2: 100 % (06/13/24 1205) Vital Signs (24h Range):  Temp:  [98.9 °F (37.2 °C)-102.2 °F (39 °C)] 98.9 °F (37.2 °C)  Pulse:  [] 77  Resp:  [18-20] 18  SpO2:  [92 %-100 %] 100 %  BP: (136-190)/(73-89) 141/82     Weight: 79 kg (174 lb 2.6  oz)  Body mass index is 28.98 kg/m².    Intake/Output Summary (Last 24 hours) at 6/13/2024 1424  Last data filed at 6/13/2024 0100  Gross per 24 hour   Intake 240 ml   Output 601 ml   Net -361 ml         Physical Exam  Constitutional:       General: She is not in acute distress.     Appearance: She is normal weight.   Cardiovascular:      Rate and Rhythm: Normal rate.      Pulses: Normal pulses.   Pulmonary:      Effort: No respiratory distress.      Breath sounds: Normal breath sounds. No wheezing.   Abdominal:      General: Bowel sounds are normal. There is no distension.      Palpations: Abdomen is soft.      Tenderness: There is no abdominal tenderness.   Musculoskeletal:      Right lower leg: No edema.      Left lower leg: No edema.   Skin:     General: Skin is warm.   Neurological:      Mental Status: She is alert and oriented to person, place, and time. Mental status is at baseline.   Psychiatric:         Mood and Affect: Mood normal.             Significant Labs: All pertinent labs within the past 24 hours have been reviewed.    Significant Imaging: I have reviewed all pertinent imaging results/findings within the past 24 hours.      Assessment/Plan:      * Meningoencephalitis  - Sepsis with b/l hydronephrosis, unclear origin of infection as urine is clean. No skin cellulitis or wounds. No oral/dental infx. No PNA. IV abx and robison placed. 103F despite V+Z, Stiff neck, shaking, Alterned mental status, Alert but not following any   commands, not tracking staring blank. Neurogenic bladder w Hydroneph- no bacteria or WBC in urine.     - Starting meningitis antibiotics. Meningitis meds started. Move to ICU. IR consult for LP. EEG ordered. ID and Neuro consult.   - LP performed on 6/8 --> Cell count/diff with 560 WBC, predominantly lymphocytes with elevated protein and glucose  - on broad spectrum meningitis antibiotics while cultures/labs return  - mental status is improving with current regimen  - appreciate ID  recommendations  - EEG  suggestive of mild diffuse cerebral dysfunction. No epileptiform activity or electrographic seizures seen.  -MRI revealed no acute intracranial abnormality.  MRI L-spine with degenerative disc disease.  -de-escalated to acyclovir alone per ID recommendations.  Plan for repeat LP if no improved    Hydroureteronephrosis  Urology recommended no acute intervention given no evidence of UTI/ANITA.  voiding trial today    Fever, unknown origin  - suspect due to meningitis  -id on board.  Fever curve improved      HLD (hyperlipidemia)  Resume statin.      DM (diabetes mellitus)  Patient's FSGs are controlled on current medication regimen.  Last A1c reviewed-   Lab Results   Component Value Date    HGBA1C 8.0 (H) 06/08/2024     Most recent fingerstick glucose reviewed-   Recent Labs   Lab 06/10/24  1753 06/10/24  2007 06/11/24  0835 06/11/24  1151   POCTGLUCOSE 224* 210* 230* 247*       Current correctional scale  Low  Maintain anti-hyperglycemic dose as follows-   Antihyperglycemics (From admission, onward)      Start     Stop Route Frequency Ordered    06/10/24 2100  insulin glargine U-100 (Lantus) pen 6 Units         -- SubQ 2 times daily 06/10/24 1243    06/09/24 1645  insulin aspart U-100 pen 5 Units         -- SubQ 3 times daily with meals 06/09/24 1209    06/09/24 1308  insulin aspart U-100 pen 1-10 Units         -- SubQ Before meals & nightly PRN 06/09/24 1209          Hold Oral hypoglycemics while patient is in the hospital.  A1c 8.  Monitor blood glucose on Decadron    HTN (hypertension)  Chronic, controlled. Latest blood pressure and vitals reviewed-     Temp:  [97.9 °F (36.6 °C)-98.9 °F (37.2 °C)]   Pulse:  []   Resp:  [16-23]   BP: (120-179)/()   SpO2:  [95 %-100 %] .   Home meds for hypertension were reviewed and noted below.   Hypertension Medications               lisinopriL (PRINIVIL,ZESTRIL) 20 MG tablet Take 20 mg by mouth 2 (two) times a day.    metoprolol succinate  (TOPROL-XL) 200 MG 24 hr tablet Take 200 mg by mouth once daily.            While in the hospital, will manage blood pressure as follows; Continue home antihypertensive regimen    Will utilize p.r.n. blood pressure medication only if patient's blood pressure greater than 180/110 and she develops symptoms such as worsening chest pain or shortness of breath.      VTE Risk Mitigation (From admission, onward)           Ordered     enoxaparin injection 40 mg  Daily         06/07/24 1857     IP VTE HIGH RISK PATIENT  Once         06/07/24 1857     Place sequential compression device  Until discontinued         06/07/24 1857                    Discharge Planning   PHYLICIA:      Code Status: Full Code   Is the patient medically ready for discharge?:     Reason for patient still in hospital (select all that apply): Patient trending condition and Treatment  Discharge Plan A: Home   Discharge Delays: None known at this time              Sofia Blanchard MD  Department of Hospital Medicine   [facility]

## 2024-06-13 NOTE — PLAN OF CARE
CM spoke with pt about PT/OT recommendations for Home Health. Pt has agreed to receive HH services.     I provided the patient a choice of post acute providers and offered a list of CMS rated Home Health     Patient has declined to select a preferred provider and elects placement with the first accepting in network provider that is available to provide services as ordered by the physician.      6 referrals sent via care port.    8:38 am Pt was accepted by [facility].  is Lisa [phone].

## 2024-06-14 LAB
ALBUMIN SERPL BCP-MCNC: 2.5 G/DL (ref 3.5–5.2)
ALP SERPL-CCNC: 44 U/L (ref 55–135)
ALT SERPL W/O P-5'-P-CCNC: 24 U/L (ref 10–44)
ANION GAP SERPL CALC-SCNC: 11 MMOL/L (ref 8–16)
ASCENDING AORTA: 3.18 CM
AST SERPL-CCNC: 16 U/L (ref 10–40)
AV INDEX (PROSTH): 0.63
AV MEAN GRADIENT: 9 MMHG
AV PEAK GRADIENT: 13 MMHG
AV VALVE AREA BY VELOCITY RATIO: 1.92 CM²
AV VALVE AREA: 2.18 CM²
AV VELOCITY RATIO: 0.56
BASOPHILS # BLD AUTO: 0.01 K/UL (ref 0–0.2)
BASOPHILS NFR BLD: 0.1 % (ref 0–1.9)
BILIRUB SERPL-MCNC: 0.9 MG/DL (ref 0.1–1)
BSA FOR ECHO PROCEDURE: 1.9 M2
BUN SERPL-MCNC: 9 MG/DL (ref 8–23)
C3 SERPL-MCNC: 98 MG/DL (ref 50–180)
C4 SERPL-MCNC: 26 MG/DL (ref 11–44)
CALCIUM SERPL-MCNC: 8.4 MG/DL (ref 8.7–10.5)
CHLORIDE SERPL-SCNC: 98 MMOL/L (ref 95–110)
CLARITY CSF: CLEAR
CO2 SERPL-SCNC: 25 MMOL/L (ref 23–29)
COLOR CSF: COLORLESS
CREAT SERPL-MCNC: 0.8 MG/DL (ref 0.5–1.4)
CSF TUBE NUMBER: 1
CSF TUBE NUMBER: 2
CV ECHO LV RWT: 0.51 CM
DIFFERENTIAL METHOD BLD: NORMAL
DOP CALC AO PEAK VEL: 1.82 M/S
DOP CALC AO VTI: 34.2 CM
DOP CALC LVOT AREA: 3.4 CM2
DOP CALC LVOT DIAMETER: 2.09 CM
DOP CALC LVOT PEAK VEL: 1.02 M/S
DOP CALC LVOT STROKE VOLUME: 74.41 CM3
DOP CALCLVOT PEAK VEL VTI: 21.7 CM
E WAVE DECELERATION TIME: 163.5 MSEC
E/A RATIO: 1.03
E/E' RATIO: 15.2 M/S
ECHO LV POSTERIOR WALL: 1.05 CM (ref 0.6–1.1)
EOSINOPHIL # BLD AUTO: 0 K/UL (ref 0–0.5)
EOSINOPHIL NFR BLD: 0.1 % (ref 0–8)
ERYTHROCYTE [DISTWIDTH] IN BLOOD BY AUTOMATED COUNT: 12.9 % (ref 11.5–14.5)
EST. GFR  (NO RACE VARIABLE): >60 ML/MIN/1.73 M^2
FRACTIONAL SHORTENING: 36 % (ref 28–44)
GLUCOSE CSF-MCNC: 67 MG/DL (ref 40–70)
GLUCOSE SERPL-MCNC: 110 MG/DL (ref 70–110)
HCT VFR BLD AUTO: 37.1 % (ref 37–48.5)
HGB BLD-MCNC: 12.6 G/DL (ref 12–16)
IMM GRANULOCYTES # BLD AUTO: 0.03 K/UL (ref 0–0.04)
IMM GRANULOCYTES NFR BLD AUTO: 0.4 % (ref 0–0.5)
INTERVENTRICULAR SEPTUM: 1.07 CM (ref 0.6–1.1)
IVC DIAMETER: 1.39 CM
IVRT: 106.57 MSEC
LA MAJOR: 5.76 CM
LA MINOR: 5.41 CM
LA WIDTH: 3.9 CM
LEFT ATRIUM SIZE: 4.33 CM
LEFT ATRIUM VOLUME INDEX: 42.8 ML/M2
LEFT ATRIUM VOLUME: 80.09 CM3
LEFT INTERNAL DIMENSION IN SYSTOLE: 2.62 CM (ref 2.1–4)
LEFT VENTRICLE DIASTOLIC VOLUME INDEX: 39.57 ML/M2
LEFT VENTRICLE DIASTOLIC VOLUME: 73.99 ML
LEFT VENTRICLE MASS INDEX: 76 G/M2
LEFT VENTRICLE SYSTOLIC VOLUME INDEX: 13.4 ML/M2
LEFT VENTRICLE SYSTOLIC VOLUME: 25.07 ML
LEFT VENTRICULAR INTERNAL DIMENSION IN DIASTOLE: 4.09 CM (ref 3.5–6)
LEFT VENTRICULAR MASS: 142.93 G
LV LATERAL E/E' RATIO: 19 M/S
LV SEPTAL E/E' RATIO: 12.67 M/S
LVOT MG: 2.67 MMHG
LVOT MV: 0.79 CM/S
LYMPHOCYTES # BLD AUTO: 1.5 K/UL (ref 1–4.8)
LYMPHOCYTES NFR BLD: 21.5 % (ref 18–48)
LYMPHOCYTES NFR CSF MANUAL: 87 % (ref 40–80)
MAGNESIUM SERPL-MCNC: 1.5 MG/DL (ref 1.6–2.6)
MCH RBC QN AUTO: 28.3 PG (ref 27–31)
MCHC RBC AUTO-ENTMCNC: 34 G/DL (ref 32–36)
MCV RBC AUTO: 83 FL (ref 82–98)
MONOCYTES # BLD AUTO: 0.9 K/UL (ref 0.3–1)
MONOCYTES NFR BLD: 12.7 % (ref 4–15)
MONOS+MACROS NFR CSF MANUAL: 12 % (ref 15–45)
MV PEAK A VEL: 0.74 M/S
MV PEAK E VEL: 0.76 M/S
MV STENOSIS PRESSURE HALF TIME: 47.41 MS
MV VALVE AREA P 1/2 METHOD: 4.64 CM2
NEUTROPHILS # BLD AUTO: 4.4 K/UL (ref 1.8–7.7)
NEUTROPHILS NFR BLD: 65.2 % (ref 38–73)
NEUTROPHILS NFR CSF MANUAL: 1 % (ref 0–6)
NRBC BLD-RTO: 0 /100 WBC
OHS CV RV/LV RATIO: 1.01 CM
PISA TR MAX VEL: 3.09 M/S
PLATELET # BLD AUTO: 206 K/UL (ref 150–450)
PMV BLD AUTO: 9.7 FL (ref 9.2–12.9)
POCT GLUCOSE: 108 MG/DL (ref 70–110)
POCT GLUCOSE: 120 MG/DL (ref 70–110)
POCT GLUCOSE: 138 MG/DL (ref 70–110)
POCT GLUCOSE: 95 MG/DL (ref 70–110)
POTASSIUM SERPL-SCNC: 3.4 MMOL/L (ref 3.5–5.1)
PROT CSF-MCNC: 126 MG/DL (ref 15–40)
PROT SERPL-MCNC: 6.1 G/DL (ref 6–8.4)
PV PEAK GRADIENT: 4 MMHG
PV PEAK VELOCITY: 1.06 M/S
RA MAJOR: 5.5 CM
RA PRESSURE ESTIMATED: 3 MMHG
RA WIDTH: 3.7 CM
RBC # BLD AUTO: 4.45 M/UL (ref 4–5.4)
RBC # CSF: 1350 /CU MM
RIGHT VENTRICULAR END-DIASTOLIC DIMENSION: 4.15 CM
RV TB RVSP: 6 MMHG
RV TISSUE DOPPLER FREE WALL SYSTOLIC VELOCITY 1 (APICAL 4 CHAMBER VIEW): 18.32 CM/S
SINUS: 2.89 CM
SODIUM SERPL-SCNC: 134 MMOL/L (ref 136–145)
SPECIMEN VOL CSF: 2.5 ML
STJ: 2.44 CM
TDI LATERAL: 0.04 M/S
TDI SEPTAL: 0.06 M/S
TDI: 0.05 M/S
TR MAX PG: 38 MMHG
TRICUSPID ANNULAR PLANE SYSTOLIC EXCURSION: 2.37 CM
TV PEAK GRADIENT: 3 MMHG
TV REST PULMONARY ARTERY PRESSURE: 41 MMHG
WBC # BLD AUTO: 6.83 K/UL (ref 3.9–12.7)
WBC # CSF: 369 /CU MM (ref 0–5)
Z-SCORE OF LEFT VENTRICULAR DIMENSION IN END DIASTOLE: -2.42
Z-SCORE OF LEFT VENTRICULAR DIMENSION IN END SYSTOLE: -1.62

## 2024-06-14 PROCEDURE — 85025 COMPLETE CBC W/AUTO DIFF WBC: CPT | Performed by: STUDENT IN AN ORGANIZED HEALTH CARE EDUCATION/TRAINING PROGRAM

## 2024-06-14 PROCEDURE — 87385 HISTOPLASMA CAPSUL AG IA: CPT | Performed by: STUDENT IN AN ORGANIZED HEALTH CARE EDUCATION/TRAINING PROGRAM

## 2024-06-14 PROCEDURE — 87070 CULTURE OTHR SPECIMN AEROBIC: CPT | Performed by: HOSPITALIST

## 2024-06-14 PROCEDURE — 27000207 HC ISOLATION

## 2024-06-14 PROCEDURE — 25000003 PHARM REV CODE 250: Performed by: NURSE PRACTITIONER

## 2024-06-14 PROCEDURE — A4216 STERILE WATER/SALINE, 10 ML: HCPCS | Performed by: STUDENT IN AN ORGANIZED HEALTH CARE EDUCATION/TRAINING PROGRAM

## 2024-06-14 PROCEDURE — 87040 BLOOD CULTURE FOR BACTERIA: CPT | Performed by: STUDENT IN AN ORGANIZED HEALTH CARE EDUCATION/TRAINING PROGRAM

## 2024-06-14 PROCEDURE — 25000003 PHARM REV CODE 250: Performed by: STUDENT IN AN ORGANIZED HEALTH CARE EDUCATION/TRAINING PROGRAM

## 2024-06-14 PROCEDURE — 86255 FLUORESCENT ANTIBODY SCREEN: CPT | Mod: 59 | Performed by: STUDENT IN AN ORGANIZED HEALTH CARE EDUCATION/TRAINING PROGRAM

## 2024-06-14 PROCEDURE — 36415 COLL VENOUS BLD VENIPUNCTURE: CPT | Performed by: STUDENT IN AN ORGANIZED HEALTH CARE EDUCATION/TRAINING PROGRAM

## 2024-06-14 PROCEDURE — 63600175 PHARM REV CODE 636 W HCPCS: Performed by: STUDENT IN AN ORGANIZED HEALTH CARE EDUCATION/TRAINING PROGRAM

## 2024-06-14 PROCEDURE — 82945 GLUCOSE OTHER FLUID: CPT | Performed by: HOSPITALIST

## 2024-06-14 PROCEDURE — 80053 COMPREHEN METABOLIC PANEL: CPT | Performed by: STUDENT IN AN ORGANIZED HEALTH CARE EDUCATION/TRAINING PROGRAM

## 2024-06-14 PROCEDURE — 84157 ASSAY OF PROTEIN OTHER: CPT | Performed by: HOSPITALIST

## 2024-06-14 PROCEDURE — 99233 SBSQ HOSP IP/OBS HIGH 50: CPT | Mod: ,,, | Performed by: STUDENT IN AN ORGANIZED HEALTH CARE EDUCATION/TRAINING PROGRAM

## 2024-06-14 PROCEDURE — 87483 CNS DNA AMP PROBE TYPE 12-25: CPT | Performed by: HOSPITALIST

## 2024-06-14 PROCEDURE — 51798 US URINE CAPACITY MEASURE: CPT

## 2024-06-14 PROCEDURE — 11000001 HC ACUTE MED/SURG PRIVATE ROOM

## 2024-06-14 PROCEDURE — 87529 HSV DNA AMP PROBE: CPT | Performed by: HOSPITALIST

## 2024-06-14 PROCEDURE — 009U3ZX DRAINAGE OF SPINAL CANAL, PERCUTANEOUS APPROACH, DIAGNOSTIC: ICD-10-PCS | Performed by: RADIOLOGY

## 2024-06-14 PROCEDURE — 87449 NOS EACH ORGANISM AG IA: CPT | Performed by: STUDENT IN AN ORGANIZED HEALTH CARE EDUCATION/TRAINING PROGRAM

## 2024-06-14 PROCEDURE — 25000003 PHARM REV CODE 250: Performed by: INTERNAL MEDICINE

## 2024-06-14 PROCEDURE — 83735 ASSAY OF MAGNESIUM: CPT | Performed by: STUDENT IN AN ORGANIZED HEALTH CARE EDUCATION/TRAINING PROGRAM

## 2024-06-14 PROCEDURE — 89051 BODY FLUID CELL COUNT: CPT | Performed by: HOSPITALIST

## 2024-06-14 PROCEDURE — 87205 SMEAR GRAM STAIN: CPT | Performed by: HOSPITALIST

## 2024-06-14 RX ORDER — LIDOCAINE HYDROCHLORIDE 10 MG/ML
INJECTION INFILTRATION; PERINEURAL
Status: COMPLETED | OUTPATIENT
Start: 2024-06-14 | End: 2024-06-14

## 2024-06-14 RX ORDER — ENOXAPARIN SODIUM 100 MG/ML
40 INJECTION SUBCUTANEOUS EVERY 24 HOURS
Status: DISCONTINUED | OUTPATIENT
Start: 2024-06-14 | End: 2024-06-14

## 2024-06-14 RX ORDER — POTASSIUM CHLORIDE 20 MEQ/1
40 TABLET, EXTENDED RELEASE ORAL ONCE
Status: COMPLETED | OUTPATIENT
Start: 2024-06-14 | End: 2024-06-14

## 2024-06-14 RX ORDER — MAGNESIUM SULFATE HEPTAHYDRATE 40 MG/ML
2 INJECTION, SOLUTION INTRAVENOUS ONCE
Status: COMPLETED | OUTPATIENT
Start: 2024-06-14 | End: 2024-06-14

## 2024-06-14 RX ADMIN — ACYCLOVIR SODIUM 570 MG: 50 INJECTION, SOLUTION INTRAVENOUS at 09:06

## 2024-06-14 RX ADMIN — Medication 10 ML: at 05:06

## 2024-06-14 RX ADMIN — METOPROLOL SUCCINATE 100 MG: 50 TABLET, EXTENDED RELEASE ORAL at 08:06

## 2024-06-14 RX ADMIN — LIDOCAINE HYDROCHLORIDE 5 ML: 10 INJECTION, SOLUTION INFILTRATION; PERINEURAL at 02:06

## 2024-06-14 RX ADMIN — Medication 10 ML: at 12:06

## 2024-06-14 RX ADMIN — LISINOPRIL 40 MG: 20 TABLET ORAL at 08:06

## 2024-06-14 RX ADMIN — MAGNESIUM SULFATE HEPTAHYDRATE 2 G: 40 INJECTION, SOLUTION INTRAVENOUS at 08:06

## 2024-06-14 RX ADMIN — ACYCLOVIR SODIUM 570 MG: 50 INJECTION, SOLUTION INTRAVENOUS at 06:06

## 2024-06-14 RX ADMIN — ACETAMINOPHEN 650 MG: 325 TABLET ORAL at 08:06

## 2024-06-14 RX ADMIN — OXYCODONE AND ACETAMINOPHEN 1 TABLET: 10; 325 TABLET ORAL at 04:06

## 2024-06-14 RX ADMIN — CETIRIZINE HYDROCHLORIDE 10 MG: 10 TABLET, FILM COATED ORAL at 08:06

## 2024-06-14 RX ADMIN — Medication 10 ML: at 11:06

## 2024-06-14 RX ADMIN — ACYCLOVIR SODIUM 570 MG: 50 INJECTION, SOLUTION INTRAVENOUS at 12:06

## 2024-06-14 RX ADMIN — LIDOCAINE HYDROCHLORIDE 3 ML: 10 INJECTION, SOLUTION INFILTRATION; PERINEURAL at 02:06

## 2024-06-14 RX ADMIN — AMLODIPINE BESYLATE 10 MG: 5 TABLET ORAL at 08:06

## 2024-06-14 RX ADMIN — INSULIN ASPART 5 UNITS: 100 INJECTION, SOLUTION INTRAVENOUS; SUBCUTANEOUS at 08:06

## 2024-06-14 RX ADMIN — POTASSIUM CHLORIDE 40 MEQ: 1500 TABLET, EXTENDED RELEASE ORAL at 08:06

## 2024-06-14 RX ADMIN — Medication 10 ML: at 06:06

## 2024-06-14 RX ADMIN — Medication 6 MG: at 09:06

## 2024-06-14 RX ADMIN — SODIUM CHLORIDE: 9 INJECTION, SOLUTION INTRAVENOUS at 11:06

## 2024-06-14 RX ADMIN — INSULIN ASPART 5 UNITS: 100 INJECTION, SOLUTION INTRAVENOUS; SUBCUTANEOUS at 04:06

## 2024-06-14 NOTE — NURSING
Patient have been febrile times and was treated with acetaminophen 650 mg with good results.  Patient have been ST and elevated HTN.  Provider was aware of change in condition, new orders were written and main campus transferred was considered.  Patient is now resting quietly at this time.

## 2024-06-14 NOTE — SUBJECTIVE & OBJECTIVE
Interval History: getting TTE today. States her headache and neck pain are improved this morning. Having fever currently. Has not been able to urinate with purewick and is retaining urine. Having urinary frequency requiring increased trips to the bathroom.     Review of Systems   Constitutional:  Positive for chills, fatigue and fever.   Gastrointestinal:  Positive for abdominal pain (RUQ pain improved).   Genitourinary:  Positive for difficulty urinating, frequency and pelvic pain.   Musculoskeletal:  Positive for myalgias and neck pain.   Neurological:  Positive for headaches.     Objective:     Vital Signs (Most Recent):  Temp: (!) 101.3 °F (38.5 °C) (06/14/24 0806)  Pulse: (!) 117 (06/14/24 0759)  Resp: 18 (06/14/24 0741)  BP: 138/83 (06/14/24 0741)  SpO2: 97 % (06/14/24 0741) Vital Signs (24h Range):  Temp:  [98.8 °F (37.1 °C)-101.6 °F (38.7 °C)] 101.3 °F (38.5 °C)  Pulse:  [] 117  Resp:  [18] 18  SpO2:  [97 %-100 %] 97 %  BP: (136-214)/(80-92) 138/83     Weight: 79 kg (174 lb 2.6 oz)  Body mass index is 28.98 kg/m².    Estimated Creatinine Clearance: 61.2 mL/min (based on SCr of 0.8 mg/dL).     Physical Exam  Vitals and nursing note reviewed.   Constitutional:       Appearance: She is ill-appearing. She is not toxic-appearing.   HENT:      Head: Normocephalic.      Mouth/Throat:      Mouth: Mucous membranes are moist.      Pharynx: Oropharynx is clear.   Pulmonary:      Effort: Pulmonary effort is normal. No respiratory distress.   Abdominal:      General: There is no distension.      Palpations: Abdomen is soft.      Tenderness: There is no abdominal tenderness.   Musculoskeletal:      Cervical back: Neck supple. Tenderness present. No rigidity.      Right lower leg: No edema.      Left lower leg: No edema.   Skin:     General: Skin is warm and dry.      Findings: No lesion or rash.   Neurological:      Mental Status: She is alert and oriented to person, place, and time.          Significant Labs:    Microbiology Results (last 7 days)       Procedure Component Value Units Date/Time    AFB Culture & Smear [[id number]]     Order Status: Completed Specimen: CSF (Spinal Fluid) from CSF Tap, Tube 2     Blood culture [[id number]] Collected: 06/14/24 0804    Order Status: Sent Specimen: Blood from Peripheral, Antecubital, Left Updated: 06/14/24 0815    Blood culture [[id number]] Collected: 06/14/24 0804    Order Status: Sent Specimen: Blood from Peripheral, Hand, Left Updated: 06/14/24 0815    Blood culture [[id number]] Collected: 06/12/24 1553    Order Status: Completed Specimen: Blood from Peripheral, Wrist, Left Updated: 06/13/24 1703     Blood Culture, Routine No Growth to date      No Growth to date    Blood culture [[id number]] Collected: 06/12/24 1553    Order Status: Completed Specimen: Blood from Peripheral, Forearm, Left Updated: 06/13/24 1703     Blood Culture, Routine No Growth to date      No Growth to date    CSF culture [[id number]] Collected: 06/08/24 1247    Order Status: Completed Specimen: CSF (Spinal Fluid) from CSF Tap, Tube 1 Updated: 06/12/24 0735     CSF CULTURE No Growth     Gram Stain Result Cytospin indicates:      Many WBC's      No organisms seen    Blood culture [[id number]] Collected: 06/08/24 0542    Order Status: Completed Specimen: Blood from Peripheral, Hand, Right Updated: 06/12/24 0703     Blood Culture, Routine No Growth after 4 days.    Blood culture x two cultures. Draw prior to antibiotics. [[id number]] Collected: 06/07/24 1124    Order Status: Completed Specimen: Blood from Peripheral, Antecubital, Left Updated: 06/11/24 1303     Blood Culture, Routine No Growth after 4 days.    Narrative:      Aerobic and anaerobic    Blood culture x two cultures. Draw prior to antibiotics. [[id number]] Collected: 06/07/24 1138    Order Status: Completed Specimen: Blood from Peripheral, Upper Arm, Right Updated: 06/11/24 1303     Blood Culture, Routine No Growth after 4 days.    Narrative:       Aerobic and anaerobic    Cryptococcal antigen, CSF [[id number]] Collected: 06/08/24 1247    Order Status: Completed Specimen: CSF (Spinal Fluid) from CSF Tap, Tube 1 Updated: 06/09/24 1722     Crypto Ag, CSF Negative    Respiratory Infection Panel (PCR), Nasopharyngeal [[id number]] Collected: 06/07/24 2222    Order Status: Completed Specimen: Nasopharyngeal Swab Updated: 06/08/24 2058     Respiratory Infection Panel Source NP Swab     Adenovirus Not Detected     Coronavirus 229E, Common Cold Virus Not Detected     Coronavirus HKU1, Common Cold Virus Not Detected     Coronavirus NL63, Common Cold Virus Not Detected     Coronavirus OC43, Common Cold Virus Not Detected     Comment: The Coronavirus strains detected in this test cause the common cold.  These strains are not the COVID-19 (novel Coronavirus)strain   associated with the respiratory disease outbreak.          SARS-CoV2 (COVID-19) Qualitative PCR Not Detected     Human Metapneumovirus Not Detected     Human Rhinovirus/Enterovirus Not Detected     Influenza A (subtypes H1, H1-2009,H3) Not Detected     Influenza B Not Detected     Parainfluenza Virus 1 Not Detected     Parainfluenza Virus 2 Not Detected     Parainfluenza Virus 3 Not Detected     Parainfluenza Virus 4 Not Detected     Respiratory Syncytial Virus Not Detected     Bordetella Parapertussis (IW4288) Not Detected     Bordetella pertussis (ptxP) Not Detected     Chlamydia pneumoniae Not Detected     Mycoplasma pneumoniae Not Detected    Narrative:      Assay not valid for lower respiratory specimens, alternate  testing required.    Fungus culture [[id number]] Collected: 06/08/24 1247    Order Status: Sent Specimen: CSF (Spinal Fluid) from CSF Tap, Tube 1 Updated: 06/08/24 1312    Gram stain [[id number]] Collected: 06/08/24 1247    Order Status: Canceled Specimen: CSF (Spinal Fluid) from CSF Tap, Tube 1     Respiratory Infection Panel (PCR), Nasopharyngeal [[id number]]     Order Status: Canceled  Specimen: Nasopharyngeal Swab     Culture, Anaerobe [[id number]]     Order Status: Canceled Specimen: CSF (Spinal Fluid)     Aerobic culture [[id number]]     Order Status: Canceled Specimen: CSF (Spinal Fluid)     Blood culture x two cultures. Draw prior to antibiotics. [[id number]]     Order Status: Canceled Specimen: Blood     Blood culture x two cultures. Draw prior to antibiotics. [[id number]]     Order Status: Canceled Specimen: Blood             Significant Imaging: I have reviewed all pertinent imaging results/findings within the past 24 hours.

## 2024-06-14 NOTE — NURSING
[facility]  Nurses Note -- 4 Eyes      6/13/2024       Skin assessed on: Q Shift      [x] No Pressure Injuries Present    [x]Prevention Measures Documented    [] Yes LDA  for Pressure Injury Previously documented     [] Yes New Pressure Injury Discovered   [] LDA for New Pressure Injury Added      Attending RN:  Yg Taylor RN     Second RN:  ANIRUDH Lara

## 2024-06-14 NOTE — PLAN OF CARE
Per attending, patient not medically ready for disharge.  Patient agreeable to home health services and clinically accepted by Apolonia King.  Per attending pt may need repeat LP.  SM to continue to assess for and until discharge.    06/14/24 1533   Discharge Reassessment   Assessment Type Discharge Planning Reassessment   Did the patient's condition or plan change since previous assessment? No   Discharge Plan discussed with: Patient   Communicated PHYLICIA with patient/caregiver Date not available/Unable to determine   Discharge Plan A Home   Discharge Plan B Home Health   DME Needed Upon Discharge  none   Transition of Care Barriers None   Why the patient remains in the hospital Requires continued medical care   Post-Acute Status   Hospital Resources/Appts/Education Provided Provided patient/caregiver with written discharge plan information   Discharge Delays None known at this time

## 2024-06-14 NOTE — ASSESSMENT & PLAN NOTE
- Sepsis with b/l hydronephrosis, unclear origin of infection as urine is clean. No skin cellulitis or wounds. No oral/dental infx. No PNA. IV abx and robison placed. 103F despite V+Z, Stiff neck, shaking, Alterned mental status, Alert but not following any   commands, not tracking staring blank. Neurogenic bladder w Hydroneph- no bacteria or WBC in urine.     - Starting meningitis antibiotics. Meningitis meds started. Move to ICU. IR consult for LP. EEG ordered. ID and Neuro consult.   - LP performed on 6/8 --> Cell count/diff with 560 WBC, predominantly lymphocytes with elevated protein and glucose  - on broad spectrum meningitis antibiotics while cultures/labs return  - mental status is improving with current regimen  - appreciate ID recommendations  - EEG  suggestive of mild diffuse cerebral dysfunction. No epileptiform activity or electrographic seizures seen.  -MRI revealed no acute intracranial abnormality.  MRI L-spine with degenerative disc disease.  -de-escalated to acyclovir alone per ID recommendations.  Plan for repeat LP   Patient is alert time 3 today,with no sign of neck stiffness,will monitor,while is on acyclovir.updated family.will have repeat LP with autoimmune hepatitis and fungal and EEG.

## 2024-06-14 NOTE — PROGRESS NOTES
[facility] Medicine  Progress Note    Patient Name: Sussy Fulton  MRN: [id number]  Patient Class: IP- Inpatient   Admission Date: 6/7/2024  Length of Stay: 7 days  Attending Physician: Gill Cox, *  Primary Care Provider: Arlen Rivera MD        Subjective:     Principal Problem:Meningoencephalitis        HPI:  79 y.o.  female with a past medical history significant for GERD, hypertension, hyperlipidemia and non-insulin-dependent type 2 diabetes presents to the ER with family due to weakness x1 day and not feeling well.  Patient denies any nausea any vomiting any diarrhea.  Patient denies any shortness breath or cough.  States that she started feeling bad after eating Chinese food with her family last night.  No one else in the family is feeling sick.  While she was in the ER was noted to have a temperature of a 103°.  Labs noted for white count of 11.8.  Lactic was normal.  Troponins were negative.  Urine was negative for any UTI.  COVID was negative chemistry was overall unremarkable.  Cultures were sent and she was started on vanc and Zosyn.  We were consulted for further evaluation and management of fever of unknown origin.  CT head was done and also negative for any mass or bleed.    On exam patient was noted to be tender on her right flank.  CT of the abdomen and pelvis with IV contrast was ordered for possible stone versus pyelo versus abscess.  CT of the abdomen and pelvis noted: Impression: Bilateral hydroureteronephrosis and moderate urinary bladder distension.  No nephrolithiasis.  Findings may be related to obstructive uropathy. Hepatic steatosis. Cholelithiasis and or a small gallbladder sludge.Small hiatal hernia. Consult urology was placed.  I contacted Urology to let them know the consulted to discuss possibilities of an infected stone.  Urology reviewed the report with me and no stone was mentioned.  Recommended Muhammad placement and continued  IV antibiotics.  We will follow up with her.  Culture sent from the Muhammad placed and pending.      Overview/Hospital Course:  79-year-old  female with past medical history of hypertension, hyperlipidemia, diabetes, GERD who was admitted for Sepsis likely secondary to meningoencephalitis.  Acute metabolic encephalopathy.  CT with b/l hydronephrosis.  Patient was initiated on empiric therapy for meningitis given stiff neck and AMS and moved to ICU.  ID and Neurology were consulted.  status post IR guided LP with 560 WBC and 75% lymphocytes - concerning for viral or fungal. Elevated Protein 223. On Amphotericin B, vanc, ceftriaxone, ampicillin, acylocivir and dexamethasone. Awaiting further culture data.  DC amphotericin and Decadron per ID recs.  Mental status improving.  MRI negative  EEG revealed diffuse slowing suggestive of mild diffuse cerebral dysfunction. No epileptiform activity or electrographic seizures seen.  VZV VDRL and West Nile pending.  Repeat blood cultures and urinalysis with urine culture..  PT/OT on board.  Reconsulted Neurology .  Plans for repeat LP if not much improvement,  Patient is alert time 3 today,with no sign of neck stiffness,will monitor,while is on acyclovir.updated family.will have repeat LP with autoimmune hepatitis and fungal and EEG.    Interval History:  T-max of 102.2° F overnight.  Urinalysis negative.  Blood cultures no growth to date.  Id plans for repeat LP if no improvement  Patient is alert time 3 today,with no sign of neck stiffness,will monitor,while is on acyclovir.updated family.    Review of Systems   Constitutional: Negative.    Respiratory: Negative.     Cardiovascular: Negative.    Gastrointestinal: Negative.    Genitourinary: Negative.    Musculoskeletal: Negative.    Neurological:  Positive for weakness and headaches.     Objective:     Vital Signs (Most Recent):  Temp: (!) 101.3 °F (38.5 °C) (06/14/24 0806)  Pulse: (!) 117 (06/14/24 0759)  Resp:  18 (06/14/24 0741)  BP: 138/83 (06/14/24 0741)  SpO2: 97 % (06/14/24 0741) Vital Signs (24h Range):  Temp:  [98.8 °F (37.1 °C)-101.6 °F (38.7 °C)] 101.3 °F (38.5 °C)  Pulse:  [] 117  Resp:  [18] 18  SpO2:  [97 %-100 %] 97 %  BP: (136-214)/(80-92) 138/83     Weight: 79 kg (174 lb 2.6 oz)  Body mass index is 28.98 kg/m².    Intake/Output Summary (Last 24 hours) at 6/14/2024 1051  Last data filed at 6/14/2024 1011  Gross per 24 hour   Intake 120 ml   Output 2970 ml   Net -2850 ml         Physical Exam  Constitutional:       General: She is not in acute distress.     Appearance: She is normal weight.   Cardiovascular:      Rate and Rhythm: Normal rate.      Pulses: Normal pulses.   Pulmonary:      Effort: No respiratory distress.      Breath sounds: Normal breath sounds. No wheezing.   Abdominal:      General: Bowel sounds are normal. There is no distension.      Palpations: Abdomen is soft.      Tenderness: There is no abdominal tenderness.   Musculoskeletal:      Right lower leg: No edema.      Left lower leg: No edema.   Skin:     General: Skin is warm.   Neurological:      Mental Status: She is alert and oriented to person, place, and time. Mental status is at baseline.   Psychiatric:         Mood and Affect: Mood normal.             Significant Labs: All pertinent labs within the past 24 hours have been reviewed.    Significant Imaging: I have reviewed all pertinent imaging results/findings within the past 24 hours.      Assessment/Plan:      * Meningoencephalitis  - Sepsis with b/l hydronephrosis, unclear origin of infection as urine is clean. No skin cellulitis or wounds. No oral/dental infx. No PNA. IV abx and robison placed. 103F despite V+Z, Stiff neck, shaking, Alterned mental status, Alert but not following any   commands, not tracking staring blank. Neurogenic bladder w Hydroneph- no bacteria or WBC in urine.     - Starting meningitis antibiotics. Meningitis meds started. Move to ICU. IR consult for LP.  EEG ordered. ID and Neuro consult.   - LP performed on 6/8 --> Cell count/diff with 560 WBC, predominantly lymphocytes with elevated protein and glucose  - on broad spectrum meningitis antibiotics while cultures/labs return  - mental status is improving with current regimen  - appreciate ID recommendations  - EEG  suggestive of mild diffuse cerebral dysfunction. No epileptiform activity or electrographic seizures seen.  -MRI revealed no acute intracranial abnormality.  MRI L-spine with degenerative disc disease.  -de-escalated to acyclovir alone per ID recommendations.  Plan for repeat LP   Patient is alert time 3 today,with no sign of neck stiffness,will monitor,while is on acyclovir.updated family.will have repeat LP with autoimmune hepatitis and fungal and EEG.    Hydroureteronephrosis  Urology recommended no acute intervention given no evidence of UTI/ANITA.  voiding trial today    Fever, unknown origin  - suspect due to meningitis  -id on board.  Fever curve improved      HLD (hyperlipidemia)  Resume statin.      DM (diabetes mellitus)  Patient's FSGs are controlled on current medication regimen.  Last A1c reviewed-   Lab Results   Component Value Date    HGBA1C 8.0 (H) 06/08/2024     Most recent fingerstick glucose reviewed-   Recent Labs   Lab 06/10/24  1753 06/10/24  2007 06/11/24  0835 06/11/24  1151   POCTGLUCOSE 224* 210* 230* 247*       Current correctional scale  Low  Maintain anti-hyperglycemic dose as follows-   Antihyperglycemics (From admission, onward)      Start     Stop Route Frequency Ordered    06/10/24 2100  insulin glargine U-100 (Lantus) pen 6 Units         -- SubQ 2 times daily 06/10/24 1243    06/09/24 1645  insulin aspart U-100 pen 5 Units         -- SubQ 3 times daily with meals 06/09/24 1209    06/09/24 1308  insulin aspart U-100 pen 1-10 Units         -- SubQ Before meals & nightly PRN 06/09/24 1209          Hold Oral hypoglycemics while patient is in the hospital.  A1c 8.  Monitor blood  glucose on Decadron    HTN (hypertension)  Chronic, controlled. Latest blood pressure and vitals reviewed-     Temp:  [97.9 °F (36.6 °C)-98.9 °F (37.2 °C)]   Pulse:  []   Resp:  [16-23]   BP: (120-179)/()   SpO2:  [95 %-100 %] .   Home meds for hypertension were reviewed and noted below.   Hypertension Medications               lisinopriL (PRINIVIL,ZESTRIL) 20 MG tablet Take 20 mg by mouth 2 (two) times a day.    metoprolol succinate (TOPROL-XL) 200 MG 24 hr tablet Take 200 mg by mouth once daily.            While in the hospital, will manage blood pressure as follows; Continue home antihypertensive regimen    Will utilize p.r.n. blood pressure medication only if patient's blood pressure greater than 180/110 and she develops symptoms such as worsening chest pain or shortness of breath.      VTE Risk Mitigation (From admission, onward)           Ordered     IP VTE HIGH RISK PATIENT  Once         06/07/24 1857     Place sequential compression device  Until discontinued         06/07/24 1857                    Discharge Planning   PHYLICIA:      Code Status: Full Code   Is the patient medically ready for discharge?:     Reason for patient still in hospital (select all that apply): Patient trending condition  Discharge Plan A: Home   Discharge Delays: None known at this time              Gill Cox MD  Department of Hospital Medicine   [facility] - [place], [place] - [facility]

## 2024-06-14 NOTE — ASSESSMENT & PLAN NOTE
Sussy Fulton is a 79 year old woman who was admitted for weakness, malaise and fevers x 1 day.  UA bland, no leukocytosis, CTH wnl, CT a/p with hydroureteronephrosis (no obstructing stones s/p robison) otherwise unremarkable. UA neg x 2. Noted to have neck pain and worsening encephalopathy concerning for meningitis. Broadened to vanc, ceftriaxone, ampicillin and acyclovir. LP revealed cloudy csf (wbc 500/ lymp / prtn 223/ gluc 100). Ampho started due to concern for fungal cns infection, culture no growth to day. Recent URI may be source of cns infection, LP results more consistent with viral etiology. CSF cultures and crypto antigen negative. CSF encephalitis/meningitis PCR panel negative, HSV 1/2 PCR negative, VZV negative. HIV neg. EEG w/o seizures. RIP neg. Mental status appears to be waxing and waning which could suggest element of hospital delirium. Viral encephalitis high on differential, but would consider other non-infectious causes given stalled improvement and recurrence of fevers now that she is off steroids. Ceftriaxone could be causing some neurotoxicity, so stopped given negative bacterial meningitis work up.     Recommendations  - will follow repeat blood cultures   - would continue acyclovir meningitis dosing for now  - Karius and West Nile IgM and IgG pending  - would repeat LP with cell counts, protein, glucose, HSV PCR, culture, ME panel, cytology, autoimmune and paraneoplastic panel  - if she continues to have headache and neck pain, would consider repeat CNS imaging to include vasculature like CTA head and neck

## 2024-06-14 NOTE — PT/OT/SLP PROGRESS
Occupational Therapy      Patient Name:  Sussy Fulton   MRN:  [id number]    Patient not seen today secondary to Off the floor for procedure/surgery (lumbar puncture). Will follow-up as able.    6/14/2024

## 2024-06-14 NOTE — SUBJECTIVE & OBJECTIVE
Interval History:  T-max of 102.2° F overnight.  Urinalysis negative.  Blood cultures no growth to date.  Id plans for repeat LP if no improvement  Patient is alert time 3 today,with no sign of neck stiffness,will monitor,while is on acyclovir.updated family.    Review of Systems   Constitutional: Negative.    Respiratory: Negative.     Cardiovascular: Negative.    Gastrointestinal: Negative.    Genitourinary: Negative.    Musculoskeletal: Negative.    Neurological:  Positive for weakness and headaches.     Objective:     Vital Signs (Most Recent):  Temp: (!) 101.3 °F (38.5 °C) (06/14/24 0806)  Pulse: (!) 117 (06/14/24 0759)  Resp: 18 (06/14/24 0741)  BP: 138/83 (06/14/24 0741)  SpO2: 97 % (06/14/24 0741) Vital Signs (24h Range):  Temp:  [98.8 °F (37.1 °C)-101.6 °F (38.7 °C)] 101.3 °F (38.5 °C)  Pulse:  [] 117  Resp:  [18] 18  SpO2:  [97 %-100 %] 97 %  BP: (136-214)/(80-92) 138/83     Weight: 79 kg (174 lb 2.6 oz)  Body mass index is 28.98 kg/m².    Intake/Output Summary (Last 24 hours) at 6/14/2024 1051  Last data filed at 6/14/2024 1011  Gross per 24 hour   Intake 120 ml   Output 2970 ml   Net -2850 ml         Physical Exam  Constitutional:       General: She is not in acute distress.     Appearance: She is normal weight.   Cardiovascular:      Rate and Rhythm: Normal rate.      Pulses: Normal pulses.   Pulmonary:      Effort: No respiratory distress.      Breath sounds: Normal breath sounds. No wheezing.   Abdominal:      General: Bowel sounds are normal. There is no distension.      Palpations: Abdomen is soft.      Tenderness: There is no abdominal tenderness.   Musculoskeletal:      Right lower leg: No edema.      Left lower leg: No edema.   Skin:     General: Skin is warm.   Neurological:      Mental Status: She is alert and oriented to person, place, and time. Mental status is at baseline.   Psychiatric:         Mood and Affect: Mood normal.             Significant Labs: All pertinent labs within the  past 24 hours have been reviewed.    Significant Imaging: I have reviewed all pertinent imaging results/findings within the past 24 hours.

## 2024-06-14 NOTE — NURSING
Patient c/o having urge to urinate but unable to go with the use of purewick. 907ml urine noted in the bladder scan done at the bedside. Notified provider, eber SHER Made order and carried out, straight cath done aseptically with 900 ml urine output. Plan of care ongoing

## 2024-06-14 NOTE — PLAN OF CARE
Procedure completed, pt tolerated well. No apparent distress noted. 10 cc of CSF removed, band aid applied CDI. Labs collected and sent. Patient to be transferred back to room.

## 2024-06-14 NOTE — PLAN OF CARE
Problem: Skin Injury Risk Increased  Goal: Skin Health and Integrity  Intervention: Optimize Skin Protection  Flowsheets (Taken 6/14/2024 0616)  Pressure Reduction Techniques: frequent weight shift encouraged  Pressure Reduction Devices: pressure-redistributing mattress utilized  Skin Protection: incontinence pads utilized  Activity Management:   Arm raise - L1   Rolling - L1   Up to bedside commode - L3   Standing - L3  Head of Bed (HOB) Positioning: HOB elevated     Problem: Fall Injury Risk  Goal: Absence of Fall and Fall-Related Injury  Intervention: Promote Injury-Free Environment  Flowsheets (Taken 6/14/2024 0616)  Safety Promotion/Fall Prevention:   assistive device/personal item within reach   bed alarm set   family to remain at bedside   side rails raised x 2   room near unit station   instructed to call staff for mobility

## 2024-06-14 NOTE — PROCEDURES
Radiology Post-Procedure Note     Pre Op Diagnosis: acute encephalopathy  Post Op Diagnosis: Same     Procedure: 1. Fluoroscopic-guided L4-5 diagnostic LP and CSF sampling     Procedure performed by: Mary Moore NP     Written Informed Consent Obtained: Yes  Specimen Removed: YES, 10-cc's of clear CSF  Estimated Blood Loss: none     Findings:   Successful fluoroscopic-guided L4-5 diagnostic LP and CSF sampling under local anesthesia. Patient tolerated the procedure well. No immediate post-procedural complications noted.      Patient transferred back to floor for 2 hours bed rest with HOB flat.    Mary Moore NP  Interventional Radiology

## 2024-06-14 NOTE — PROGRESS NOTES
[facility] [place] - Telemetry  Infectious Disease  Progress Note    Patient Name: Sussy Fulton  MRN: [id number]  Admission Date: 6/7/2024  Length of Stay: 7 days  Attending Physician: Gill Cox, *  Primary Care Provider: Arlen Rivera MD    Isolation Status: Droplet  Assessment/Plan:      Other  Fever, unknown origin  Sussy Fulton is a 79 year old woman who was admitted for weakness, malaise and fevers x 1 day.  UA bland, no leukocytosis, CTH wnl, CT a/p with hydroureteronephrosis (no obstructing stones s/p robison) otherwise unremarkable. UA neg x 2. Noted to have neck pain and worsening encephalopathy concerning for meningitis. Broadened to vanc, ceftriaxone, ampicillin and acyclovir. LP revealed cloudy csf (wbc 500/ lymp / prtn 223/ gluc 100). Ampho started due to concern for fungal cns infection, culture no growth to day. Recent URI may be source of cns infection, LP results more consistent with viral etiology. CSF cultures and crypto antigen negative. CSF encephalitis/meningitis PCR panel negative, HSV 1/2 PCR negative, VZV negative. HIV neg. EEG w/o seizures. RIP neg. Mental status appears to be waxing and waning which could suggest element of hospital delirium. Viral encephalitis high on differential, but would consider other non-infectious causes given stalled improvement and recurrence of fevers now that she is off steroids. Ceftriaxone could be causing some neurotoxicity, so stopped given negative bacterial meningitis work up.     Recommendations  - will follow repeat blood cultures   - would continue acyclovir meningitis dosing for now  - Karius and West Nile IgM and IgG pending  - would repeat LP with cell counts, protein, glucose, HSV PCR, culture, ME panel, cytology, autoimmune and paraneoplastic panel  - if she continues to have headache and neck pain, would consider repeat CNS imaging to include vasculature like CTA head and neck      Above discussed with primary team.     Time: 50  "minutes   50% of time spent on face-to-face counseling and coordination of care. Counseling included review of test results, diagnosis, and treatment plan with patient and/or family.  I have reviewed hospital notes from  service and other specialty providers as well as outside medical records. I have also reviewed CBC, CMP/BMP,  cultures and imaging with my interpretation as documented. Patient is high risk of morbidity, on antibiotics requiring intensive monitoring for toxicity.     Anticipated Disposition: TBD    Thank you for your consult. I will follow-up with patient. Please contact us if you have any additional questions.    Marla Becerril MD  Infectious Disease  [facility] - Telemetry    Subjective:     Principal Problem:Meningoencephalitis    HPI: 80y/o F pt w hx of HTN, HLD, T2DM  presented to the ER with family due to weakness x1 day and not feeling well and was admitted 6/7 for presumed sepsis with unclear source.    Pt denied nausea, vomiting, diarrhea, shortness breath or cough.  States that she started feeling bad after eating Chinese food with her family last night.  Denies sick contacts.      In the ED  pt was febrile to 103°.  Labs remarkable for wbc 11.8, LA wnl, UA bland,  COVID neg.  Started on empiric vanc and Zosyn. CT head unremarkable. CT of the abd/pel revealed: "Bilateral hydroureteronephrosis and moderate urinary bladder distension.  No nephrolithiasis.  Findings may be related to obstructive uropathy." Urology consulted and recommended robison placement and continued IV antibiotics.  Culture sent from the Robison placed and pending.    Hospital course c/b fevers despite bs abx and worsening acute encephalopathy prompting transfer to icu and broadening of abx to vacn, ceftriaxone, ampicillin and acyclovir for meningitis coverage.    ID consulted for: "103F despite V+Z, Stiff neck, shaking, Alterned mental status, Alert but not following any commands, not tracking staring blank. " "?neuorgenic bladder w Hydroneph- no bacterio or WBC in urine "    Pt reports recent sinusitis 1 wk prior for which she received a steroid injection and albuterol. Notes new b/l lower back pain on day of presentation, but otherwise no new symptoms. Denies sick contacts, animal exposures, recent travel or recent surgeries/ dental procedures. Does not work. Lives with her  who is a dialysis pt and her son. Denies having eaten any unpasteurized dairy products. Ate chinese food tues, but nobody else got sick.   Interval History: getting TTE today. States her headache and neck pain are improved this morning. Having fever currently. Has not been able to urinate with purewick and is retaining urine. Having urinary frequency requiring increased trips to the bathroom.     Review of Systems   Constitutional:  Positive for chills, fatigue and fever.   Gastrointestinal:  Positive for abdominal pain (RUQ pain improved).   Genitourinary:  Positive for difficulty urinating, frequency and pelvic pain.   Musculoskeletal:  Positive for myalgias and neck pain.   Neurological:  Positive for headaches.     Objective:     Vital Signs (Most Recent):  Temp: (!) 101.3 °F (38.5 °C) (06/14/24 0806)  Pulse: (!) 117 (06/14/24 0759)  Resp: 18 (06/14/24 0741)  BP: 138/83 (06/14/24 0741)  SpO2: 97 % (06/14/24 0741) Vital Signs (24h Range):  Temp:  [98.8 °F (37.1 °C)-101.6 °F (38.7 °C)] 101.3 °F (38.5 °C)  Pulse:  [] 117  Resp:  [18] 18  SpO2:  [97 %-100 %] 97 %  BP: (136-214)/(80-92) 138/83     Weight: 79 kg (174 lb 2.6 oz)  Body mass index is 28.98 kg/m².    Estimated Creatinine Clearance: 61.2 mL/min (based on SCr of 0.8 mg/dL).     Physical Exam  Vitals and nursing note reviewed.   Constitutional:       Appearance: She is ill-appearing. She is not toxic-appearing.   HENT:      Head: Normocephalic.      Mouth/Throat:      Mouth: Mucous membranes are moist.      Pharynx: Oropharynx is clear.   Pulmonary:      Effort: Pulmonary effort " is normal. No respiratory distress.   Abdominal:      General: There is no distension.      Palpations: Abdomen is soft.      Tenderness: There is no abdominal tenderness.   Musculoskeletal:      Cervical back: Neck supple. Tenderness present. No rigidity.      Right lower leg: No edema.      Left lower leg: No edema.   Skin:     General: Skin is warm and dry.      Findings: No lesion or rash.   Neurological:      Mental Status: She is alert and oriented to person, place, and time.          Significant Labs:   Microbiology Results (last 7 days)       Procedure Component Value Units Date/Time    AFB Culture & Smear [[id number]]     Order Status: Completed Specimen: CSF (Spinal Fluid) from CSF Tap, Tube 2     Blood culture [[id number]] Collected: 06/14/24 0804    Order Status: Sent Specimen: Blood from Peripheral, Antecubital, Left Updated: 06/14/24 0815    Blood culture [[id number]] Collected: 06/14/24 0804    Order Status: Sent Specimen: Blood from Peripheral, Hand, Left Updated: 06/14/24 0815    Blood culture [[id number]] Collected: 06/12/24 1553    Order Status: Completed Specimen: Blood from Peripheral, Wrist, Left Updated: 06/13/24 1703     Blood Culture, Routine No Growth to date      No Growth to date    Blood culture [[id number]] Collected: 06/12/24 1553    Order Status: Completed Specimen: Blood from Peripheral, Forearm, Left Updated: 06/13/24 1703     Blood Culture, Routine No Growth to date      No Growth to date    CSF culture [[id number]] Collected: 06/08/24 1247    Order Status: Completed Specimen: CSF (Spinal Fluid) from CSF Tap, Tube 1 Updated: 06/12/24 0735     CSF CULTURE No Growth     Gram Stain Result Cytospin indicates:      Many WBC's      No organisms seen    Blood culture [[id number]] Collected: 06/08/24 0542    Order Status: Completed Specimen: Blood from Peripheral, Hand, Right Updated: 06/12/24 0703     Blood Culture, Routine No Growth after 4 days.    Blood culture x two cultures. Draw  prior to antibiotics. [[id number]] Collected: 06/07/24 1124    Order Status: Completed Specimen: Blood from Peripheral, Antecubital, Left Updated: 06/11/24 1303     Blood Culture, Routine No Growth after 4 days.    Narrative:      Aerobic and anaerobic    Blood culture x two cultures. Draw prior to antibiotics. [[id number]] Collected: 06/07/24 1138    Order Status: Completed Specimen: Blood from Peripheral, Upper Arm, Right Updated: 06/11/24 1303     Blood Culture, Routine No Growth after 4 days.    Narrative:      Aerobic and anaerobic    Cryptococcal antigen, CSF [[id number]] Collected: 06/08/24 1247    Order Status: Completed Specimen: CSF (Spinal Fluid) from CSF Tap, Tube 1 Updated: 06/09/24 1722     Crypto Ag, CSF Negative    Respiratory Infection Panel (PCR), Nasopharyngeal [[id number]] Collected: 06/07/24 2222    Order Status: Completed Specimen: Nasopharyngeal Swab Updated: 06/08/24 2058     Respiratory Infection Panel Source NP Swab     Adenovirus Not Detected     Coronavirus 229E, Common Cold Virus Not Detected     Coronavirus HKU1, Common Cold Virus Not Detected     Coronavirus NL63, Common Cold Virus Not Detected     Coronavirus OC43, Common Cold Virus Not Detected     Comment: The Coronavirus strains detected in this test cause the common cold.  These strains are not the COVID-19 (novel Coronavirus)strain   associated with the respiratory disease outbreak.          SARS-CoV2 (COVID-19) Qualitative PCR Not Detected     Human Metapneumovirus Not Detected     Human Rhinovirus/Enterovirus Not Detected     Influenza A (subtypes H1, H1-2009,H3) Not Detected     Influenza B Not Detected     Parainfluenza Virus 1 Not Detected     Parainfluenza Virus 2 Not Detected     Parainfluenza Virus 3 Not Detected     Parainfluenza Virus 4 Not Detected     Respiratory Syncytial Virus Not Detected     Bordetella Parapertussis (NZ5293) Not Detected     Bordetella pertussis (ptxP) Not Detected     Chlamydia pneumoniae Not  Detected     Mycoplasma pneumoniae Not Detected    Narrative:      Assay not valid for lower respiratory specimens, alternate  testing required.    Fungus culture [[id number]] Collected: 06/08/24 1247    Order Status: Sent Specimen: CSF (Spinal Fluid) from CSF Tap, Tube 1 Updated: 06/08/24 1312    Gram stain [[id number]] Collected: 06/08/24 1247    Order Status: Canceled Specimen: CSF (Spinal Fluid) from CSF Tap, Tube 1     Respiratory Infection Panel (PCR), Nasopharyngeal [[id number]]     Order Status: Canceled Specimen: Nasopharyngeal Swab     Culture, Anaerobe [[id number]]     Order Status: Canceled Specimen: CSF (Spinal Fluid)     Aerobic culture [[id number]]     Order Status: Canceled Specimen: CSF (Spinal Fluid)     Blood culture x two cultures. Draw prior to antibiotics. [[id number]]     Order Status: Canceled Specimen: Blood     Blood culture x two cultures. Draw prior to antibiotics. [[id number]]     Order Status: Canceled Specimen: Blood             Significant Imaging: I have reviewed all pertinent imaging results/findings within the past 24 hours.

## 2024-06-14 NOTE — CONSULTS
Interventional Radiology   Consult History & Physical      Date: 6/14/2024   Primary team: Networked reference to record PCT , Gill Cox, *   Room/bed: W317/W317 A    Inpatient consult to Interventional Radiology  Consult performed by: Mary Moore, NP  Consult ordered by: Gill Cox MD           Reason for Consult:   Meningoencephalitis, request for repeat diagnostic LP    History of Present Illness:  Sussy Fulton is a 79 y.o. 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 She was started on empiric vanc, rocephin, ampicillin and acyclovir. Pt underwent diagnostic LP with IR on 6/8. LP results more consistent with viral etiology. CSF cultures and encephalitis/meningitis PCR panels were negative.   Infectious disease is recommending repeating LP with CSF labs.     Interventional Radiology has been consulted for repeat diagnostic LP.     Past Medical History:  Past Medical History:   Diagnosis Date    Diabetes mellitus     Hypertension        Past Surgical History:  No past surgical history on file.     Sedation History:    No known adverse reactions.     Social History:  Social History     Tobacco Use    Smoking status: Never    Smokeless tobacco: Never   Substance Use Topics    Alcohol use: Not Currently        Home Medications:   Prior to Admission medications    Medication Sig Start Date End Date Taking? Authorizing Provider   atorvastatin (LIPITOR) 10 MG tablet Take 10 mg by mouth once daily. Patient doesn't know the dose    Provider, Historical   cetirizine (ZYRTEC) 10 MG tablet Take 10 mg by mouth once daily.    Provider, Historical   diclofenac sodium (VOLTAREN) 1 % Gel Apply 2 g topically once daily. 9/15/22   Carmela Johnson MD   HYDROcodone-acetaminophen (NORCO) 5-325 mg per tablet Take 1 tablet by mouth every 6 (six) hours as needed. 9/1/19   Johann Valdez DNP   lisinopriL (PRINIVIL,ZESTRIL) 20 MG  tablet Take 20 mg by mouth 2 (two) times a day.    Provider, Historical   metFORMIN (GLUCOPHAGE) 500 MG tablet Take 500 mg by mouth 2 (two) times daily with meals.    Provider, Historical   metoprolol succinate (TOPROL-XL) 200 MG 24 hr tablet Take 200 mg by mouth once daily.    Provider, Historical   omeprazole (PRILOSEC) 20 MG capsule Take 20 mg by mouth once daily.    Provider, Historical       Inpatient Medications:    Current Facility-Administered Medications:     0.9%  NaCl infusion, , Intravenous, Continuous, Erasmo Alvarez MD, Last Rate: 75 mL/hr at 06/14/24 1113, New Bag at 06/14/24 1113    acetaminophen suppository 650 mg, 650 mg, Rectal, Q6H PRN, Erasmo Alvarez MD, 650 mg at 06/12/24 0058    acetaminophen tablet 650 mg, 650 mg, Oral, Q4H PRN, Erasmo Alvarez MD, 650 mg at 06/14/24 0806    acyclovir 570 mg in dextrose 5 % (D5W) 100 mL IVPB, 10 mg/kg (Ideal), Intravenous, Q8H, Sofia Blanchard MD, Stopped at 06/14/24 0706    albuterol-ipratropium 2.5 mg-0.5 mg/3 mL nebulizer solution 3 mL, 3 mL, Nebulization, Q4H PRN, Erasmo Alvarez MD    aluminum-magnesium hydroxide-simethicone 200-200-20 mg/5 mL suspension 30 mL, 30 mL, Oral, QID PRN, Erasmo Alvarez MD    amLODIPine tablet 10 mg, 10 mg, Oral, Daily, Sofia Blanchard MD, 10 mg at 06/14/24 0848    cetirizine tablet 10 mg, 10 mg, Oral, Daily, Sofia Blanchard MD, 10 mg at 06/14/24 0848    dextrose 10% bolus 125 mL 125 mL, 12.5 g, Intravenous, PRN, Erasmo Alvarez MD    dextrose 10% bolus 125 mL 125 mL, 12.5 g, Intravenous, PRN, Erasmo Alvarez MD    dextrose 10% bolus 250 mL 250 mL, 25 g, Intravenous, PRN, Erasmo Alvarez MD    dextrose 10% bolus 250 mL 250 mL, 25 g, Intravenous, PRN, Erasmo Alvarez MD    glucagon (human recombinant) injection 1 mg, 1 mg, Intramuscular, PRNAntonio Jamie L., MD    glucose chewable tablet 16 g, 16 g, Oral, PRNAntonio Jamie L., MD    glucose chewable tablet 24 g, 24 g, Oral, PRAntonio BRICEÑO  Erasmo ARREAGA MD    hydrALAZINE injection 5 mg, 5 mg, Intravenous, Q6H PRN, Erasmo Alvarez MD, 5 mg at 06/12/24 1645    insulin aspart U-100 pen 1-10 Units, 1-10 Units, Subcutaneous, QID (AC + HS) PRN, Erasmo Alvarez MD, 1 Units at 06/11/24 2237    insulin aspart U-100 pen 5 Units, 5 Units, Subcutaneous, TIDWM, Erasmo Alvarez MD, 5 Units at 06/14/24 0846    lisinopriL tablet 40 mg, 40 mg, Oral, Daily, Sofia Blanchard MD, 40 mg at 06/14/24 0848    melatonin tablet 6 mg, 6 mg, Oral, Nightly PRN, Erasmo Alvarez MD    metoprolol succinate (TOPROL-XL) 24 hr tablet 100 mg, 100 mg, Oral, Daily, Margaret Espitia MD, 100 mg at 06/14/24 0848    naloxone 0.4 mg/mL injection 0.02 mg, 0.02 mg, Intravenous, PRN, Erasmo Alvarez MD    ondansetron injection 4 mg, 4 mg, Intravenous, Q6H PRN, Erasmo Alvarez MD, 4 mg at 06/09/24 1723    oxyCODONE-acetaminophen  mg per tablet 1 tablet, 1 tablet, Oral, Q4H PRN, Erasmo Alvarez MD, 1 tablet at 06/11/24 2232    polyethylene glycol packet 17 g, 17 g, Oral, Daily, Erasmo Alvarez MD, 17 g at 06/09/24 0909    simethicone chewable tablet 80 mg, 1 tablet, Oral, QID PRN, Eramso Alvarez MD, 80 mg at 06/09/24 2119    sodium chloride 0.9% flush 10 mL, 10 mL, Intravenous, Q12H PRN, Erasmo Alvarez MD    Flushing PICC/Midline Protocol, , , Until Discontinued **AND** sodium chloride 0.9% flush 10 mL, 10 mL, Intravenous, Q6H, 10 mL at 06/14/24 1113 **AND** sodium chloride 0.9% flush 10 mL, 10 mL, Intravenous, PRN, Erasmo Alvarez MD     Anticoagulants/Antiplatelets:   No anticoagulation    Allergies:   Review of patient's allergies indicates:  No Known Allergies    Review of Systems:   As documented in primary provider H&P.    Vital Signs:  Temp: (!) 101.3 °F (38.5 °C) (06/14/24 0806)  Pulse: (!) 117 (06/14/24 0759)  Resp: 18 (06/14/24 0741)  BP: 138/83 (06/14/24 0741)  SpO2: 97 % (06/14/24 0741)    Temp:  [98.8 °F (37.1 °C)-101.6 °F (38.7 °C)]   Pulse:  []   Resp:  " [18]   BP: (136-214)/(80-92)   SpO2:  [97 %-100 %]      Physical Exam:  No acute distress, sitting in chear, pleasant and cooperative  Regular rate   Breathing unlabored  Abdomen benign  Extremities warm and well perfused    Sedation Exam:  ASA: III - Patient appears to have severe systemic disease not posing a constant threat to life  Mallampati score: n/a    Laboratory:  No results found for: "INR", "PT", "PTT"    Lab Results   Component Value Date    WBC 6.83 06/14/2024    HGB 12.6 06/14/2024    HCT 37.1 06/14/2024    MCV 83 06/14/2024     06/14/2024      Lab Results   Component Value Date     06/14/2024     (L) 06/14/2024    K 3.4 (L) 06/14/2024    CL 98 06/14/2024    CO2 25 06/14/2024    BUN 9 06/14/2024    CREATININE 0.8 06/14/2024    CALCIUM 8.4 (L) 06/14/2024    MG 1.5 (L) 06/14/2024    ALT 24 06/14/2024    AST 16 06/14/2024    ALBUMIN 2.5 (L) 06/14/2024    BILITOT 0.9 06/14/2024       Imaging:   CT head 6/7 Reviewed. No acute findings      ASSESSMENT/PLAN/RECOMMENDATIONS:   78 yo F admitted with meningoencephalitis. Initial LP on 6/7 concerning for viral etiology.   Infectious diease requesting repeat diagnostic lumbar puncture.     Procedure, risks/benefits discussed with patient and daughter who wish to proceed.     All fluid studies to be ordered by referring provider.      Sedation Plan: local anesthetic only  Patient will undergo: fluoro guided diagnostic lumbar puncture    Thank you for this consult. Please contact via Epic secure chat with questions.     Mary Moore NP  Interventional Radiology      "

## 2024-06-14 NOTE — PLAN OF CARE
Pt arrived to IR for lumbar puncture, no acute distress noted. Orders and labs reviewed on chart. Consent obtained.

## 2024-06-15 LAB
ALBUMIN SERPL BCP-MCNC: 2.3 G/DL (ref 3.5–5.2)
ALP SERPL-CCNC: 38 U/L (ref 55–135)
ALT SERPL W/O P-5'-P-CCNC: 17 U/L (ref 10–44)
ANION GAP SERPL CALC-SCNC: 9 MMOL/L (ref 8–16)
AST SERPL-CCNC: 13 U/L (ref 10–40)
BASOPHILS # BLD AUTO: 0.01 K/UL (ref 0–0.2)
BASOPHILS NFR BLD: 0.2 % (ref 0–1.9)
BILIRUB SERPL-MCNC: 0.8 MG/DL (ref 0.1–1)
BUN SERPL-MCNC: 11 MG/DL (ref 8–23)
C GATTII+NEOFOR DNA CSF QL NAA+NON-PROBE: NOT DETECTED
CALCIUM SERPL-MCNC: 8.2 MG/DL (ref 8.7–10.5)
CHLORIDE SERPL-SCNC: 98 MMOL/L (ref 95–110)
CMV DNA CSF QL NAA+NON-PROBE: NOT DETECTED
CO2 SERPL-SCNC: 27 MMOL/L (ref 23–29)
CREAT SERPL-MCNC: 0.8 MG/DL (ref 0.5–1.4)
DIFFERENTIAL METHOD BLD: ABNORMAL
E COLI K1 DNA CSF QL NAA+NON-PROBE: NOT DETECTED
EOSINOPHIL # BLD AUTO: 0.1 K/UL (ref 0–0.5)
EOSINOPHIL NFR BLD: 1 % (ref 0–8)
ERYTHROCYTE [DISTWIDTH] IN BLOOD BY AUTOMATED COUNT: 12.9 % (ref 11.5–14.5)
EST. GFR  (NO RACE VARIABLE): >60 ML/MIN/1.73 M^2
EV RNA CSF QL NAA+NON-PROBE: NOT DETECTED
GLUCOSE SERPL-MCNC: 116 MG/DL (ref 70–110)
GP B STREP DNA CSF QL NAA+NON-PROBE: NOT DETECTED
HAEM INFLU DNA CSF QL NAA+NON-PROBE: NOT DETECTED
HAEM INFLU DNA CSF QL NAA+NON-PROBE: NOT DETECTED
HCT VFR BLD AUTO: 33 % (ref 37–48.5)
HGB BLD-MCNC: 10.8 G/DL (ref 12–16)
HHV6 DNA CSF QL NAA+NON-PROBE: NOT DETECTED
HSV1 DNA CSF QL NAA+NON-PROBE: NOT DETECTED
HSV2 DNA CSF QL NAA+NON-PROBE: NOT DETECTED
IMM GRANULOCYTES # BLD AUTO: 0.02 K/UL (ref 0–0.04)
IMM GRANULOCYTES NFR BLD AUTO: 0.3 % (ref 0–0.5)
LYMPHOCYTES # BLD AUTO: 1.7 K/UL (ref 1–4.8)
LYMPHOCYTES NFR BLD: 28.3 % (ref 18–48)
MAGNESIUM SERPL-MCNC: 1.6 MG/DL (ref 1.6–2.6)
MCH RBC QN AUTO: 28.2 PG (ref 27–31)
MCHC RBC AUTO-ENTMCNC: 32.7 G/DL (ref 32–36)
MCV RBC AUTO: 86 FL (ref 82–98)
MONOCYTES # BLD AUTO: 0.6 K/UL (ref 0.3–1)
MONOCYTES NFR BLD: 10.4 % (ref 4–15)
N MEN DNA CSF QL NAA+NON-PROBE: NOT DETECTED
NEUTROPHILS # BLD AUTO: 3.7 K/UL (ref 1.8–7.7)
NEUTROPHILS NFR BLD: 59.8 % (ref 38–73)
NRBC BLD-RTO: 0 /100 WBC
PARECHOVIRUS A RNA CSF QL NAA+NON-PROBE: NOT DETECTED
PLATELET # BLD AUTO: 233 K/UL (ref 150–450)
PMV BLD AUTO: 9.7 FL (ref 9.2–12.9)
POCT GLUCOSE: 108 MG/DL (ref 70–110)
POCT GLUCOSE: 118 MG/DL (ref 70–110)
POCT GLUCOSE: 124 MG/DL (ref 70–110)
POCT GLUCOSE: 166 MG/DL (ref 70–110)
POTASSIUM SERPL-SCNC: 3.4 MMOL/L (ref 3.5–5.1)
PROT SERPL-MCNC: 5.5 G/DL (ref 6–8.4)
RBC # BLD AUTO: 3.83 M/UL (ref 4–5.4)
S PNEUM DNA CSF QL NAA+NON-PROBE: NOT DETECTED
SODIUM SERPL-SCNC: 134 MMOL/L (ref 136–145)
VZV DNA CSF QL NAA+NON-PROBE: NOT DETECTED
WBC # BLD AUTO: 6.15 K/UL (ref 3.9–12.7)

## 2024-06-15 PROCEDURE — A4216 STERILE WATER/SALINE, 10 ML: HCPCS | Performed by: STUDENT IN AN ORGANIZED HEALTH CARE EDUCATION/TRAINING PROGRAM

## 2024-06-15 PROCEDURE — 25000003 PHARM REV CODE 250: Performed by: INTERNAL MEDICINE

## 2024-06-15 PROCEDURE — 36415 COLL VENOUS BLD VENIPUNCTURE: CPT | Performed by: STUDENT IN AN ORGANIZED HEALTH CARE EDUCATION/TRAINING PROGRAM

## 2024-06-15 PROCEDURE — 83735 ASSAY OF MAGNESIUM: CPT | Performed by: STUDENT IN AN ORGANIZED HEALTH CARE EDUCATION/TRAINING PROGRAM

## 2024-06-15 PROCEDURE — 99233 SBSQ HOSP IP/OBS HIGH 50: CPT | Mod: ,,, | Performed by: STUDENT IN AN ORGANIZED HEALTH CARE EDUCATION/TRAINING PROGRAM

## 2024-06-15 PROCEDURE — 25000003 PHARM REV CODE 250: Performed by: STUDENT IN AN ORGANIZED HEALTH CARE EDUCATION/TRAINING PROGRAM

## 2024-06-15 PROCEDURE — 63600175 PHARM REV CODE 636 W HCPCS: Performed by: STUDENT IN AN ORGANIZED HEALTH CARE EDUCATION/TRAINING PROGRAM

## 2024-06-15 PROCEDURE — 11000001 HC ACUTE MED/SURG PRIVATE ROOM

## 2024-06-15 PROCEDURE — 63600175 PHARM REV CODE 636 W HCPCS: Performed by: HOSPITALIST

## 2024-06-15 PROCEDURE — 80053 COMPREHEN METABOLIC PANEL: CPT | Performed by: STUDENT IN AN ORGANIZED HEALTH CARE EDUCATION/TRAINING PROGRAM

## 2024-06-15 PROCEDURE — 85025 COMPLETE CBC W/AUTO DIFF WBC: CPT | Performed by: STUDENT IN AN ORGANIZED HEALTH CARE EDUCATION/TRAINING PROGRAM

## 2024-06-15 PROCEDURE — 87449 NOS EACH ORGANISM AG IA: CPT | Performed by: STUDENT IN AN ORGANIZED HEALTH CARE EDUCATION/TRAINING PROGRAM

## 2024-06-15 RX ORDER — ENOXAPARIN SODIUM 100 MG/ML
40 INJECTION SUBCUTANEOUS EVERY 24 HOURS
Status: DISCONTINUED | OUTPATIENT
Start: 2024-06-15 | End: 2024-06-17 | Stop reason: HOSPADM

## 2024-06-15 RX ADMIN — Medication 10 ML: at 06:06

## 2024-06-15 RX ADMIN — METOPROLOL SUCCINATE 100 MG: 50 TABLET, EXTENDED RELEASE ORAL at 09:06

## 2024-06-15 RX ADMIN — Medication 10 ML: at 11:06

## 2024-06-15 RX ADMIN — INSULIN ASPART 5 UNITS: 100 INJECTION, SOLUTION INTRAVENOUS; SUBCUTANEOUS at 09:06

## 2024-06-15 RX ADMIN — ACYCLOVIR SODIUM 570 MG: 50 INJECTION, SOLUTION INTRAVENOUS at 04:06

## 2024-06-15 RX ADMIN — OXYCODONE AND ACETAMINOPHEN 1 TABLET: 10; 325 TABLET ORAL at 10:06

## 2024-06-15 RX ADMIN — INSULIN ASPART 5 UNITS: 100 INJECTION, SOLUTION INTRAVENOUS; SUBCUTANEOUS at 04:06

## 2024-06-15 RX ADMIN — Medication 10 ML: at 12:06

## 2024-06-15 RX ADMIN — SODIUM CHLORIDE: 9 INJECTION, SOLUTION INTRAVENOUS at 04:06

## 2024-06-15 RX ADMIN — OXYCODONE AND ACETAMINOPHEN 1 TABLET: 10; 325 TABLET ORAL at 09:06

## 2024-06-15 RX ADMIN — ENOXAPARIN SODIUM 40 MG: 40 INJECTION SUBCUTANEOUS at 04:06

## 2024-06-15 RX ADMIN — LISINOPRIL 40 MG: 20 TABLET ORAL at 09:06

## 2024-06-15 RX ADMIN — CETIRIZINE HYDROCHLORIDE 10 MG: 10 TABLET, FILM COATED ORAL at 09:06

## 2024-06-15 RX ADMIN — ACYCLOVIR SODIUM 570 MG: 50 INJECTION, SOLUTION INTRAVENOUS at 09:06

## 2024-06-15 RX ADMIN — AMLODIPINE BESYLATE 10 MG: 5 TABLET ORAL at 09:06

## 2024-06-15 RX ADMIN — ACYCLOVIR SODIUM 570 MG: 50 INJECTION, SOLUTION INTRAVENOUS at 12:06

## 2024-06-15 RX ADMIN — INSULIN ASPART 5 UNITS: 100 INJECTION, SOLUTION INTRAVENOUS; SUBCUTANEOUS at 12:06

## 2024-06-15 RX ADMIN — Medication 6 MG: at 09:06

## 2024-06-15 NOTE — SUBJECTIVE & OBJECTIVE
Interval History: feels improved    Review of Systems   Constitutional:  Positive for fatigue. Negative for fever.   Gastrointestinal:  Negative for abdominal pain.   Musculoskeletal:  Negative for neck pain.   Neurological:  Negative for headaches.     Objective:     Vital Signs (Most Recent):  Temp: 99.5 °F (37.5 °C) (06/15/24 1200)  Pulse: 99 (06/15/24 1200)  Resp: 18 (06/15/24 1200)  BP: 134/63 (06/15/24 1200)  SpO2: (!) 94 % (06/15/24 1200) Vital Signs (24h Range):  Temp:  [97.7 °F (36.5 °C)-99.9 °F (37.7 °C)] 99.5 °F (37.5 °C)  Pulse:  [] 99  Resp:  [17-20] 18  SpO2:  [93 %-100 %] 94 %  BP: (122-163)/(63-97) 134/63     Weight: 79 kg (174 lb 2.6 oz)  Body mass index is 28.98 kg/m².    Estimated Creatinine Clearance: 61.2 mL/min (based on SCr of 0.8 mg/dL).     Physical Exam  Vitals and nursing note reviewed.   Constitutional:       Appearance: She is not ill-appearing.   HENT:      Head: Normocephalic.   Pulmonary:      Effort: Pulmonary effort is normal. No respiratory distress.   Abdominal:      General: There is no distension.      Palpations: Abdomen is soft.      Tenderness: There is no abdominal tenderness.   Musculoskeletal:         General: No tenderness.      Cervical back: No rigidity or tenderness.   Skin:     General: Skin is warm and dry.      Findings: No rash.   Neurological:      Mental Status: She is alert.   Psychiatric:         Mood and Affect: Mood normal.          Significant Labs:   Microbiology Results (last 7 days)       Procedure Component Value Units Date/Time    CSF culture [[id number]] Collected: 06/14/24 1500    Order Status: Completed Specimen: CSF (Spinal Fluid) from CSF Tap, Tube 1 Updated: 06/15/24 1058     CSF CULTURE No Growth to date     Gram Stain Result Cytospin indicates:      Many WBC's      No organisms seen    Blood culture [[id number]] Collected: 06/14/24 0804    Order Status: Completed Specimen: Blood from Peripheral, Antecubital, Left Updated: 06/15/24 0903      Blood Culture, Routine No Growth to date      No Growth to date    Blood culture [[id number]] Collected: 06/14/24 0804    Order Status: Completed Specimen: Blood from Peripheral, Hand, Left Updated: 06/15/24 0903     Blood Culture, Routine No Growth to date      No Growth to date    Blood culture [[id number]] Collected: 06/12/24 1553    Order Status: Completed Specimen: Blood from Peripheral, Wrist, Left Updated: 06/14/24 1703     Blood Culture, Routine No Growth to date      No Growth to date      No Growth to date    Blood culture [[id number]] Collected: 06/12/24 1553    Order Status: Completed Specimen: Blood from Peripheral, Forearm, Left Updated: 06/14/24 1703     Blood Culture, Routine No Growth to date      No Growth to date      No Growth to date    AFB Culture & Smear [[id number]]     Order Status: Completed Specimen: CSF (Spinal Fluid) from CSF Tap, Tube 2     CSF culture [[id number]] Collected: 06/08/24 1247    Order Status: Completed Specimen: CSF (Spinal Fluid) from CSF Tap, Tube 1 Updated: 06/12/24 0735     CSF CULTURE No Growth     Gram Stain Result Cytospin indicates:      Many WBC's      No organisms seen    Blood culture [[id number]] Collected: 06/08/24 0542    Order Status: Completed Specimen: Blood from Peripheral, Hand, Right Updated: 06/12/24 0703     Blood Culture, Routine No Growth after 4 days.    Blood culture x two cultures. Draw prior to antibiotics. [[id number]] Collected: 06/07/24 1124    Order Status: Completed Specimen: Blood from Peripheral, Antecubital, Left Updated: 06/11/24 1303     Blood Culture, Routine No Growth after 4 days.    Narrative:      Aerobic and anaerobic    Blood culture x two cultures. Draw prior to antibiotics. [[id number]] Collected: 06/07/24 1138    Order Status: Completed Specimen: Blood from Peripheral, Upper Arm, Right Updated: 06/11/24 1303     Blood Culture, Routine No Growth after 4 days.    Narrative:      Aerobic and anaerobic    Cryptococcal antigen, CSF  [[id number]] Collected: 06/08/24 1247    Order Status: Completed Specimen: CSF (Spinal Fluid) from CSF Tap, Tube 1 Updated: 06/09/24 1722     Crypto Ag, CSF Negative    Respiratory Infection Panel (PCR), Nasopharyngeal [[id number]] Collected: 06/07/24 2222    Order Status: Completed Specimen: Nasopharyngeal Swab Updated: 06/08/24 2058     Respiratory Infection Panel Source NP Swab     Adenovirus Not Detected     Coronavirus 229E, Common Cold Virus Not Detected     Coronavirus HKU1, Common Cold Virus Not Detected     Coronavirus NL63, Common Cold Virus Not Detected     Coronavirus OC43, Common Cold Virus Not Detected     Comment: The Coronavirus strains detected in this test cause the common cold.  These strains are not the COVID-19 (novel Coronavirus)strain   associated with the respiratory disease outbreak.          SARS-CoV2 (COVID-19) Qualitative PCR Not Detected     Human Metapneumovirus Not Detected     Human Rhinovirus/Enterovirus Not Detected     Influenza A (subtypes H1, H1-2009,H3) Not Detected     Influenza B Not Detected     Parainfluenza Virus 1 Not Detected     Parainfluenza Virus 2 Not Detected     Parainfluenza Virus 3 Not Detected     Parainfluenza Virus 4 Not Detected     Respiratory Syncytial Virus Not Detected     Bordetella Parapertussis (VN9706) Not Detected     Bordetella pertussis (ptxP) Not Detected     Chlamydia pneumoniae Not Detected     Mycoplasma pneumoniae Not Detected    Narrative:      Assay not valid for lower respiratory specimens, alternate  testing required.    Fungus culture [[id number]] Collected: 06/08/24 1247    Order Status: Sent Specimen: CSF (Spinal Fluid) from CSF Tap, Tube 1 Updated: 06/08/24 1312    Gram stain [[id number]] Collected: 06/08/24 1247    Order Status: Canceled Specimen: CSF (Spinal Fluid) from CSF Tap, Tube 1             Significant Imaging: I have reviewed all pertinent imaging results/findings within the past 24 hours.

## 2024-06-15 NOTE — PLAN OF CARE
Problem: Adult Inpatient Plan of Care  Goal: Plan of Care Review  Outcome: Progressing  Goal: Patient-Specific Goal (Individualized)  Outcome: Progressing  Goal: Absence of Hospital-Acquired Illness or Injury  Outcome: Progressing  Goal: Optimal Comfort and Wellbeing  Outcome: Progressing  Goal: Readiness for Transition of Care  Outcome: Progressing     Problem: Skin Injury Risk Increased  Goal: Skin Health and Integrity  Outcome: Progressing     Problem: Fall Injury Risk  Goal: Absence of Fall and Fall-Related Injury  Outcome: Progressing     Problem: Wound  Goal: Optimal Coping  Outcome: Progressing  Goal: Optimal Functional Ability  Outcome: Progressing  Goal: Absence of Infection Signs and Symptoms  Outcome: Progressing  Goal: Improved Oral Intake  Outcome: Progressing  Goal: Optimal Pain Control and Function  Outcome: Progressing  Goal: Skin Health and Integrity  Outcome: Progressing  Goal: Optimal Wound Healing  Outcome: Progressing

## 2024-06-15 NOTE — NURSING
Received report from ANIRUDH Rothman. Patient lying in bed resting, NAD noted. Safety Precautions maintained, Will Monitor.

## 2024-06-15 NOTE — PT/OT/SLP EVAL
Physical Therapy Evaluation    Patient Name:  Sussy Fultno   MRN:  [id number]    Recommendations:     Discharge Recommendations: Low Intensity Therapy   Discharge Equipment Recommendations: bedside commode   Barriers to discharge:  none from therapy perspective    Assessment:     Sussy Fulton is a 79 y.o. female admitted with a medical diagnosis of Meningoencephalitis.  She presents with the following impairments/functional limitations: impaired endurance, weakness, impaired functional mobility, impaired self care skills, impaired balance, gait instability Pt presents up in chair. Pt transfers sit<>stand w/ no AD and CGAx1. Pt amb 5 ft to sink w/out AD and CGAx1. Pt able to reach outside of [place] to brush teeth. Pt amb 10 ft x2 w/ out AD and CGAx1. Pt transferred back to chair w/out AD and CGAx1.    Rehab Prognosis: Good; patient would benefit from acute skilled PT services to address these deficits and reach maximum level of function.    Recent Surgery: * No surgery found *      Plan:     During this hospitalization, patient to be seen 3 x/week to address the identified rehab impairments via gait training, therapeutic activities, therapeutic exercises, neuromuscular re-education, wheelchair management/training and progress toward the following goals:    Plan of Care Expires:  06/29/24    Subjective     Chief Complaint: fatigued  Patient/Family Comments/goals: would like to brush teeth  Pain/Comfort:  Pain Rating 1: 0/10    Patients cultural, spiritual, Gnosticist conflicts given the current situation: no    Living Environment:  Pt lives with spouse in a split level house 8 steps to bedroom, kitchen with bilat hand rails  Prior to admission, patients level of function was amb without AD.  Equipment used at home: none.  DME owned (not currently used): none.  Upon discharge, patient will have assistance from spouse.    Objective:     Communicated with Jossie alan prior to session.  Patient found up in chair with  telemetry  upon PT entry to room.    General Precautions: Standard, droplet, fall  Orthopedic Precautions:N/A   Braces: N/A  Respiratory Status: Room air    Exams:  Cognitive Exam:  Patient is oriented to Person, Place, Time, and Situation  RLE ROM: WFL  RLE Strength: WFL  LLE ROM: WFL  LLE Strength: WFL    Functional Mobility:  Transfers:     Sit to Stand:  contact guard assistance with no AD  Gait: Pt amb 5 ft to sink then 10 ft x2 w/ no AD and CGAx1  Balance: good in sitting, fair in standing      AM-PAC 6 CLICK MOBILITY  Total Score:17       Treatment & Education:  Pt educated on fall prevention strategies with amb and transfers    Patient left up in chair with all lines intact, call button in reach, nsg notified, and family present.    GOALS:   Multidisciplinary Problems       Physical Therapy Goals          Problem: Physical Therapy    Goal Priority Disciplines Outcome Goal Variances Interventions   Physical Therapy Goal     PT, PT/OT Progressing     Description: Goals to be met by: 24     Patient will increase functional independence with mobility by performin. Supine to sit with Set-up [place]  2. Sit to supine with Set-up [place]  3. Sit to stand transfer with Stand-by Assistance  4. Bed to chair transfer with Stand-by Assistance using No Assistive Device  5. Gait  x 150 feet with Supervision using No Assistive Device.                          History:     Past Medical History:   Diagnosis Date    Diabetes mellitus     Hypertension        No past surgical history on file.    Time Tracking:     PT Received On: 06/15/24  PT Start Time: 1113     PT Stop Time: 1131  PT Total Time (min): 18 min     Billable Minutes: Evaluation 18      06/15/2024

## 2024-06-15 NOTE — SUBJECTIVE & OBJECTIVE
Interval History:  T-max of 102.2° F overnight.  Urinalysis negative.  Blood cultures no growth to date.  Id plans for repeat LP if no improvement  Patient is alert time 3 today,with no sign of neck stiffness,will monitor,while is on acyclovir.updated family.  LP is repeated on 6.14.24,afebrile at this time.    Review of Systems   Constitutional: Negative.    Respiratory: Negative.     Cardiovascular: Negative.    Gastrointestinal: Negative.    Genitourinary: Negative.    Musculoskeletal: Negative.    Neurological:  Positive for weakness and headaches.     Objective:     Vital Signs (Most Recent):  Temp: 97.7 °F (36.5 °C) (06/15/24 0815)  Pulse: 100 (06/15/24 0815)  Resp: 18 (06/15/24 0915)  BP: (!) 144/77 (06/15/24 0815)  SpO2: (!) 93 % (06/15/24 0357) Vital Signs (24h Range):  Temp:  [97.7 °F (36.5 °C)-99.9 °F (37.7 °C)] 97.7 °F (36.5 °C)  Pulse:  [] 100  Resp:  [17-20] 18  SpO2:  [93 %-100 %] 93 %  BP: (122-173)/(67-97) 144/77     Weight: 79 kg (174 lb 2.6 oz)  Body mass index is 28.98 kg/m².    Intake/Output Summary (Last 24 hours) at 6/15/2024 0954  Last data filed at 6/15/2024 0633  Gross per 24 hour   Intake 1460 ml   Output 1000 ml   Net 460 ml         Physical Exam  Constitutional:       General: She is not in acute distress.     Appearance: She is normal weight.   Cardiovascular:      Rate and Rhythm: Normal rate.      Pulses: Normal pulses.   Pulmonary:      Effort: No respiratory distress.      Breath sounds: Normal breath sounds. No wheezing.   Abdominal:      General: Bowel sounds are normal. There is no distension.      Palpations: Abdomen is soft.      Tenderness: There is no abdominal tenderness.   Musculoskeletal:      Right lower leg: No edema.      Left lower leg: No edema.   Skin:     General: Skin is warm.   Neurological:      Mental Status: She is alert and oriented to person, place, and time. Mental status is at baseline.   Psychiatric:         Mood and Affect: Mood normal.              Significant Labs: All pertinent labs within the past 24 hours have been reviewed.    Significant Imaging: I have reviewed all pertinent imaging results/findings within the past 24 hours.

## 2024-06-15 NOTE — ASSESSMENT & PLAN NOTE
Sussy Fulton is a 79 year old woman who was admitted for weakness, malaise and fevers x 1 day.  UA bland, no leukocytosis, CTH wnl, CT a/p with hydroureteronephrosis (no obstructing stones s/p robison) otherwise unremarkable. UA neg x 2. Noted to have neck pain and worsening encephalopathy concerning for meningitis. Broadened to vanc, ceftriaxone, ampicillin and acyclovir. LP revealed cloudy csf (wbc 500/ lymp / prtn 223/ gluc 100). Ampho started due to concern for fungal cns infection, culture no growth to day. Recent URI may be source of cns infection, LP results more consistent with viral etiology. CSF cultures and crypto antigen negative. CSF encephalitis/meningitis PCR panel negative, HSV 1/2 PCR negative, VZV negative. HIV neg. EEG w/o seizures. RIP neg. Mental status appears to be waxing and waning which could suggest element of hospital delirium. Viral encephalitis high on differential, but would consider other non-infectious causes given stalled improvement and recurrence of fevers now that she is off steroids. Ceftriaxone could be causing some neurotoxicity, so stopped given negative bacterial meningitis work up. Repeat LP ( 87% lymph, [id number], protein 126, glucose 67). Repeat HSV, ME, culture, autoimmune and paraneoplastic panel sent. She seems improved over the last two days.     Recommendations  - continue meningitis dosed acyclovir, anticipating treating empirically for 21 day course, end 6/28/24  - Karius and West Nile IgM and IgG, repeat HSV PCR pending

## 2024-06-15 NOTE — NURSING
[facility]  Nurses Note -- 4 Eyes      6/14/2024       Skin assessed on: Q Shift      [x] No Pressure Injuries Present    []Prevention Measures Documented    [] Yes LDA  for Pressure Injury Previously documented     [] Yes New Pressure Injury Discovered   [] LDA for New Pressure Injury Added      Attending RN:  Bernadette Pineda LPN     Second RN:  ANIRUDH Ronquillo

## 2024-06-15 NOTE — PLAN OF CARE
Problem: Adult Inpatient Plan of Care  Goal: Plan of Care Review  Flowsheets (Taken 6/15/2024 0728)  Plan of Care Reviewed With: patient  Goal: Absence of Hospital-Acquired Illness or Injury  Intervention: Identify and Manage Fall Risk  Flowsheets (Taken 6/15/2024 0728)  Safety Promotion/Fall Prevention:   assistive device/personal item within reach   commode/urinal/bedpan at bedside   diversional activities provided   Fall Risk reviewed with patient/family   lighting adjusted   medications reviewed   nonskid shoes/socks when out of bed   side rails raised x 2   instructed to call staff for mobility  Intervention: Prevent Skin Injury  Flowsheets (Taken 6/15/2024 0728)  Body Position: turned  Device Skin Pressure Protection: absorbent pad utilized/changed  Intervention: Prevent Infection  Flowsheets (Taken 6/15/2024 0728)  Infection Prevention:   environmental surveillance performed   hand hygiene promoted   single patient room provided  Goal: Optimal Comfort and Wellbeing  Intervention: Monitor Pain and Promote Comfort  Flowsheets (Taken 6/15/2024 0728)  Pain Management Interventions: care clustered     Problem: Skin Injury Risk Increased  Goal: Skin Health and Integrity  Intervention: Optimize Skin Protection  Flowsheets (Taken 6/15/2024 0728)  Pressure Reduction Techniques: frequent weight shift encouraged  Activity Management: Rolling - L1  Head of Bed (HOB) Positioning: HOB elevated     Problem: Fall Injury Risk  Goal: Absence of Fall and Fall-Related Injury  Intervention: Identify and Manage Contributors  Flowsheets (Taken 6/15/2024 0728)  Medication Review/Management: medications reviewed  Intervention: Promote Injury-Free Environment  Flowsheets (Taken 6/15/2024 0728)  Safety Promotion/Fall Prevention:   assistive device/personal item within reach   commode/urinal/bedpan at bedside   diversional activities provided   Fall Risk reviewed with patient/family   lighting adjusted   medications reviewed   nonskid  shoes/socks when out of bed   side rails raised x 2   instructed to call staff for mobility     Problem: Wound  Goal: Optimal Coping  Intervention: Support Patient and Family Response  Flowsheets (Taken 6/15/2024 0728)  Supportive Measures: active listening utilized  Goal: Optimal Functional Ability  Intervention: Optimize Functional Ability  Flowsheets (Taken 6/15/2024 0728)  Activity Management: Rolling - L1  Goal: Absence of Infection Signs and Symptoms  Intervention: Prevent or Manage Infection  Flowsheets (Taken 6/15/2024 0728)  Infection Management: aseptic technique maintained  Goal: Optimal Pain Control and Function  Intervention: Prevent or Manage Pain  Flowsheets (Taken 6/15/2024 0728)  Pain Management Interventions: care clustered  Goal: Skin Health and Integrity  Intervention: Optimize Skin Protection  Flowsheets (Taken 6/15/2024 0728)  Pressure Reduction Techniques: frequent weight shift encouraged  Activity Management: Rolling - L1  Head of Bed (HOB) Positioning: HOB elevated

## 2024-06-15 NOTE — NURSING
Nurses Note -- 4 Eyes      6/15/2024   6:47 AM      Skin assessed during: Q Shift Change      [x] No Pressure Injuries Present    [x]Prevention Measures Documented      [] Yes- Altered Skin Integrity Present or Discovered   [] LDA Added if Not in Epic (Describe Wound)   [] New Altered Skin Integrity was Present on Admit and Documented in LDA   [] Wound Image Taken      Attending Nurse:  Lorna Wheatley RN/Staff Member:  Bernadette

## 2024-06-15 NOTE — PROGRESS NOTES
[facility] - Telemetry  Infectious Disease  Progress Note    Patient Name: Sussy Fulton  MRN: [id number]  Admission Date: 6/7/2024  Length of Stay: 8 days  Attending Physician: Gill Cox, *  Primary Care Provider: Arlen Rivera MD    Isolation Status: No active isolations  Assessment/Plan:      Other  Fever, unknown origin  Sussy Fulton is a 79 year old woman who was admitted for weakness, malaise and fevers x 1 day.  UA bland, no leukocytosis, CTH wnl, CT a/p with hydroureteronephrosis (no obstructing stones s/p robison) otherwise unremarkable. UA neg x 2. Noted to have neck pain and worsening encephalopathy concerning for meningitis. Broadened to vanc, ceftriaxone, ampicillin and acyclovir. LP revealed cloudy csf (wbc 500/ lymp / prtn 223/ gluc 100). Ampho started due to concern for fungal cns infection, culture no growth to day. Recent URI may be source of cns infection, LP results more consistent with viral etiology. CSF cultures and crypto antigen negative. CSF encephalitis/meningitis PCR panel negative, HSV 1/2 PCR negative, VZV negative. HIV neg. EEG w/o seizures. RIP neg. Mental status appears to be waxing and waning which could suggest element of hospital delirium. Viral encephalitis high on differential, but would consider other non-infectious causes given stalled improvement and recurrence of fevers now that she is off steroids. Ceftriaxone could be causing some neurotoxicity, so stopped given negative bacterial meningitis work up. Repeat LP ( 87% lymph, [id number], protein 126, glucose 67). Repeat HSV, ME, culture, autoimmune and paraneoplastic panel sent. She seems improved over the last two days.     Recommendations  - continue meningitis dosed acyclovir, anticipating treating empirically for 21 day course, end 6/28/24  - Karius and West Nile IgM and IgG, repeat HSV PCR pending    Above discussed with primary team.     Time: 55 minutes   50% of time spent on face-to-face  "counseling and coordination of care. Counseling included review of test results, diagnosis, and treatment plan with patient and/or family.  I have reviewed hospital notes from  service and other specialty providers as well as outside medical records. I have also reviewed CBC, CMP/BMP,  cultures and imaging with my interpretation as documented. Patient is high risk of morbidity, on antibiotics requiring intensive monitoring for toxicity.       Anticipated Disposition: TBD    Thank you for your consult. I will follow-up with patient. Please contact us if you have any additional questions.    Marla Becerril MD  Infectious Disease  [facility] - Telemetry    Subjective:     Principal Problem:Meningoencephalitis    HPI: 80y/o F pt w hx of HTN, HLD, T2DM  presented to the ER with family due to weakness x1 day and not feeling well and was admitted 6/7 for presumed sepsis with unclear source.    Pt denied nausea, vomiting, diarrhea, shortness breath or cough.  States that she started feeling bad after eating Chinese food with her family last night.  Denies sick contacts.      In the ED  pt was febrile to 103°.  Labs remarkable for wbc 11.8, LA wnl, UA bland,  COVID neg.  Started on empiric vanc and Zosyn. CT head unremarkable. CT of the abd/pel revealed: "Bilateral hydroureteronephrosis and moderate urinary bladder distension.  No nephrolithiasis.  Findings may be related to obstructive uropathy." Urology consulted and recommended robison placement and continued IV antibiotics.  Culture sent from the Robison placed and pending.    Hospital course c/b fevers despite bs abx and worsening acute encephalopathy prompting transfer to icu and broadening of abx to vacn, ceftriaxone, ampicillin and acyclovir for meningitis coverage.    ID consulted for: "103F despite V+Z, Stiff neck, shaking, Alterned mental status, Alert but not following any commands, not tracking staring blank. ?neuorgenic bladder w Hydroneph- no bacterio or WBC " "in urine "    Pt reports recent sinusitis 1 wk prior for which she received a steroid injection and albuterol. Notes new b/l lower back pain on day of presentation, but otherwise no new symptoms. Denies sick contacts, animal exposures, recent travel or recent surgeries/ dental procedures. Does not work. Lives with her  who is a dialysis pt and her son. Denies having eaten any unpasteurized dairy products. Ate chinese food tues, but nobody else got sick.   Interval History: feels improved    Review of Systems   Constitutional:  Positive for fatigue. Negative for fever.   Gastrointestinal:  Negative for abdominal pain.   Musculoskeletal:  Negative for neck pain.   Neurological:  Negative for headaches.     Objective:     Vital Signs (Most Recent):  Temp: 99.5 °F (37.5 °C) (06/15/24 1200)  Pulse: 99 (06/15/24 1200)  Resp: 18 (06/15/24 1200)  BP: 134/63 (06/15/24 1200)  SpO2: (!) 94 % (06/15/24 1200) Vital Signs (24h Range):  Temp:  [97.7 °F (36.5 °C)-99.9 °F (37.7 °C)] 99.5 °F (37.5 °C)  Pulse:  [] 99  Resp:  [17-20] 18  SpO2:  [93 %-100 %] 94 %  BP: (122-163)/(63-97) 134/63     Weight: 79 kg (174 lb 2.6 oz)  Body mass index is 28.98 kg/m².    Estimated Creatinine Clearance: 61.2 mL/min (based on SCr of 0.8 mg/dL).     Physical Exam  Vitals and nursing note reviewed.   Constitutional:       Appearance: She is not ill-appearing.   HENT:      Head: Normocephalic.   Pulmonary:      Effort: Pulmonary effort is normal. No respiratory distress.   Abdominal:      General: There is no distension.      Palpations: Abdomen is soft.      Tenderness: There is no abdominal tenderness.   Musculoskeletal:         General: No tenderness.      Cervical back: No rigidity or tenderness.   Skin:     General: Skin is warm and dry.      Findings: No rash.   Neurological:      Mental Status: She is alert.   Psychiatric:         Mood and Affect: Mood normal.          Significant Labs:   Microbiology Results (last 7 days)       " Procedure Component Value Units Date/Time    CSF culture [[id number]] Collected: 06/14/24 1500    Order Status: Completed Specimen: CSF (Spinal Fluid) from CSF Tap, Tube 1 Updated: 06/15/24 1058     CSF CULTURE No Growth to date     Gram Stain Result Cytospin indicates:      Many WBC's      No organisms seen    Blood culture [[id number]] Collected: 06/14/24 0804    Order Status: Completed Specimen: Blood from Peripheral, Antecubital, Left Updated: 06/15/24 0903     Blood Culture, Routine No Growth to date      No Growth to date    Blood culture [[id number]] Collected: 06/14/24 0804    Order Status: Completed Specimen: Blood from Peripheral, Hand, Left Updated: 06/15/24 0903     Blood Culture, Routine No Growth to date      No Growth to date    Blood culture [[id number]] Collected: 06/12/24 1553    Order Status: Completed Specimen: Blood from Peripheral, Wrist, Left Updated: 06/14/24 1703     Blood Culture, Routine No Growth to date      No Growth to date      No Growth to date    Blood culture [[id number]] Collected: 06/12/24 1553    Order Status: Completed Specimen: Blood from Peripheral, Forearm, Left Updated: 06/14/24 1703     Blood Culture, Routine No Growth to date      No Growth to date      No Growth to date    AFB Culture & Smear [[id number]]     Order Status: Completed Specimen: CSF (Spinal Fluid) from CSF Tap, Tube 2     CSF culture [[id number]] Collected: 06/08/24 1247    Order Status: Completed Specimen: CSF (Spinal Fluid) from CSF Tap, Tube 1 Updated: 06/12/24 0735     CSF CULTURE No Growth     Gram Stain Result Cytospin indicates:      Many WBC's      No organisms seen    Blood culture [[id number]] Collected: 06/08/24 0542    Order Status: Completed Specimen: Blood from Peripheral, Hand, Right Updated: 06/12/24 0703     Blood Culture, Routine No Growth after 4 days.    Blood culture x two cultures. Draw prior to antibiotics. [[id number]] Collected: 06/07/24 1124    Order Status: Completed Specimen:  Blood from Peripheral, Antecubital, Left Updated: 06/11/24 1303     Blood Culture, Routine No Growth after 4 days.    Narrative:      Aerobic and anaerobic    Blood culture x two cultures. Draw prior to antibiotics. [[id number]] Collected: 06/07/24 1138    Order Status: Completed Specimen: Blood from Peripheral, Upper Arm, Right Updated: 06/11/24 1303     Blood Culture, Routine No Growth after 4 days.    Narrative:      Aerobic and anaerobic    Cryptococcal antigen, CSF [[id number]] Collected: 06/08/24 1247    Order Status: Completed Specimen: CSF (Spinal Fluid) from CSF Tap, Tube 1 Updated: 06/09/24 1722     Crypto Ag, CSF Negative    Respiratory Infection Panel (PCR), Nasopharyngeal [[id number]] Collected: 06/07/24 2222    Order Status: Completed Specimen: Nasopharyngeal Swab Updated: 06/08/24 2058     Respiratory Infection Panel Source NP Swab     Adenovirus Not Detected     Coronavirus 229E, Common Cold Virus Not Detected     Coronavirus HKU1, Common Cold Virus Not Detected     Coronavirus NL63, Common Cold Virus Not Detected     Coronavirus OC43, Common Cold Virus Not Detected     Comment: The Coronavirus strains detected in this test cause the common cold.  These strains are not the COVID-19 (novel Coronavirus)strain   associated with the respiratory disease outbreak.          SARS-CoV2 (COVID-19) Qualitative PCR Not Detected     Human Metapneumovirus Not Detected     Human Rhinovirus/Enterovirus Not Detected     Influenza A (subtypes H1, H1-2009,H3) Not Detected     Influenza B Not Detected     Parainfluenza Virus 1 Not Detected     Parainfluenza Virus 2 Not Detected     Parainfluenza Virus 3 Not Detected     Parainfluenza Virus 4 Not Detected     Respiratory Syncytial Virus Not Detected     Bordetella Parapertussis (CT7157) Not Detected     Bordetella pertussis (ptxP) Not Detected     Chlamydia pneumoniae Not Detected     Mycoplasma pneumoniae Not Detected    Narrative:      Assay not valid for lower  respiratory specimens, alternate  testing required.    Fungus culture [[id number]] Collected: 06/08/24 1247    Order Status: Sent Specimen: CSF (Spinal Fluid) from CSF Tap, Tube 1 Updated: 06/08/24 1312    Gram stain [[id number]] Collected: 06/08/24 1247    Order Status: Canceled Specimen: CSF (Spinal Fluid) from CSF Tap, Tube 1             Significant Imaging: I have reviewed all pertinent imaging results/findings within the past 24 hours.

## 2024-06-15 NOTE — ASSESSMENT & PLAN NOTE
- Sepsis with b/l hydronephrosis, unclear origin of infection as urine is clean. No skin cellulitis or wounds. No oral/dental infx. No PNA. IV abx and robison placed. 103F despite V+Z, Stiff neck, shaking, Alterned mental status, Alert but not following any   commands, not tracking staring blank. Neurogenic bladder w Hydroneph- no bacteria or WBC in urine.     - Starting meningitis antibiotics. Meningitis meds started. Move to ICU. IR consult for LP. EEG ordered. ID and Neuro consult.   - LP performed on 6/8 --> Cell count/diff with 560 WBC, predominantly lymphocytes with elevated protein and glucose  - on broad spectrum meningitis antibiotics while cultures/labs return  - mental status is improving with current regimen  - appreciate ID recommendations  - EEG  suggestive of mild diffuse cerebral dysfunction. No epileptiform activity or electrographic seizures seen.  -MRI revealed no acute intracranial abnormality.  MRI L-spine with degenerative disc disease.  -de-escalated to acyclovir alone per ID recommendations.  Plan for repeat LP   Patient is alert time 3 today,with no sign of neck stiffness,will monitor,while is on acyclovir.updated family.will have repeat LP with autoimmune hepatitis and fungal and EEG.  LP is repeated on 6.14.24,afebrile at this time.

## 2024-06-15 NOTE — PLAN OF CARE
Problem: Physical Therapy  Goal: Physical Therapy Goal  Description: Goals to be met by: 24     Patient will increase functional independence with mobility by performin. Supine to sit with Set-up [place]  2. Sit to supine with Set-up [place]  3. Sit to stand transfer with Stand-by Assistance  4. Bed to chair transfer with Stand-by Assistance using No Assistive Device  5. Gait  x 150 feet with Supervision using No Assistive Device.     Outcome: Progressing     PT eval completed today. Pt presents up in chair. Pt transfers sit<>stand w/ no AD and CGAx1. Pt amb 5 ft to sink w/out AD and CGAx1. Pt able to reach outside of [facility] to brush teeth. Pt amb 10 ft x2 w/ out AD and CGAx1. Pt transferred back to chair w/out AD and CGAx1. Pt tolerates all activity well. Pt would benefit from BSC at d/c and low intensity therapy.

## 2024-06-15 NOTE — PROGRESS NOTES
[facility] Medicine  Progress Note    Patient Name: Sussy Fulton  MRN: [id number]  Patient Class: IP- Inpatient   Admission Date: 6/7/2024  Length of Stay: 8 days  Attending Physician: Gill Cox, *  Primary Care Provider: Arlen Rivera MD        Subjective:     Principal Problem:Meningoencephalitis        HPI:  79 y.o.  female with a past medical history significant for GERD, hypertension, hyperlipidemia and non-insulin-dependent type 2 diabetes presents to the ER with family due to weakness x1 day and not feeling well.  Patient denies any nausea any vomiting any diarrhea.  Patient denies any shortness breath or cough.  States that she started feeling bad after eating Chinese food with her family last night.  No one else in the family is feeling sick.  While she was in the ER was noted to have a temperature of a 103°.  Labs noted for white count of 11.8.  Lactic was normal.  Troponins were negative.  Urine was negative for any UTI.  COVID was negative chemistry was overall unremarkable.  Cultures were sent and she was started on vanc and Zosyn.  We were consulted for further evaluation and management of fever of unknown origin.  CT head was done and also negative for any mass or bleed.    On exam patient was noted to be tender on her right flank.  CT of the abdomen and pelvis with IV contrast was ordered for possible stone versus pyelo versus abscess.  CT of the abdomen and pelvis noted: Impression: Bilateral hydroureteronephrosis and moderate urinary bladder distension.  No nephrolithiasis.  Findings may be related to obstructive uropathy. Hepatic steatosis. Cholelithiasis and or a small gallbladder sludge.Small hiatal hernia. Consult urology was placed.  I contacted Urology to let them know the consulted to discuss possibilities of an infected stone.  Urology reviewed the report with me and no stone was mentioned.  Recommended Muhammad placement and continued  IV antibiotics.  We will follow up with her.  Culture sent from the Muhammad placed and pending.      Overview/Hospital Course:  79-year-old  female with past medical history of hypertension, hyperlipidemia, diabetes, GERD who was admitted for Sepsis likely secondary to meningoencephalitis.  Acute metabolic encephalopathy.  CT with b/l hydronephrosis.  Patient was initiated on empiric therapy for meningitis given stiff neck and AMS and moved to ICU.  ID and Neurology were consulted.  status post IR guided LP with 560 WBC and 75% lymphocytes - concerning for viral or fungal. Elevated Protein 223. On Amphotericin B, vanc, ceftriaxone, ampicillin, acylocivir and dexamethasone. Awaiting further culture data.  DC amphotericin and Decadron per ID recs.  Mental status improving.  MRI negative  EEG revealed diffuse slowing suggestive of mild diffuse cerebral dysfunction. No epileptiform activity or electrographic seizures seen.  VZV VDRL and West Nile pending.  Repeat blood cultures and urinalysis with urine culture..  PT/OT on board.  Reconsulted Neurology .  Plans for repeat LP if not much improvement,  Patient is alert time 3 today,with no sign of neck stiffness,will monitor,while is on acyclovir.updated family.will have repeat LP with autoimmune hepatitis and fungal and EEG.  LP is repeated on 6.14.24,afebrile at this time.    Interval History:  T-max of 102.2° F overnight.  Urinalysis negative.  Blood cultures no growth to date.  Id plans for repeat LP if no improvement  Patient is alert time 3 today,with no sign of neck stiffness,will monitor,while is on acyclovir.updated family.  LP is repeated on 6.14.24,afebrile at this time.    Review of Systems   Constitutional: Negative.    Respiratory: Negative.     Cardiovascular: Negative.    Gastrointestinal: Negative.    Genitourinary: Negative.    Musculoskeletal: Negative.    Neurological:  Positive for weakness and headaches.     Objective:     Vital Signs  (Most Recent):  Temp: 97.7 °F (36.5 °C) (06/15/24 0815)  Pulse: 100 (06/15/24 0815)  Resp: 18 (06/15/24 0915)  BP: (!) 144/77 (06/15/24 0815)  SpO2: (!) 93 % (06/15/24 0357) Vital Signs (24h Range):  Temp:  [97.7 °F (36.5 °C)-99.9 °F (37.7 °C)] 97.7 °F (36.5 °C)  Pulse:  [] 100  Resp:  [17-20] 18  SpO2:  [93 %-100 %] 93 %  BP: (122-173)/(67-97) 144/77     Weight: 79 kg (174 lb 2.6 oz)  Body mass index is 28.98 kg/m².    Intake/Output Summary (Last 24 hours) at 6/15/2024 0954  Last data filed at 6/15/2024 0633  Gross per 24 hour   Intake 1460 ml   Output 1000 ml   Net 460 ml         Physical Exam  Constitutional:       General: She is not in acute distress.     Appearance: She is normal weight.   Cardiovascular:      Rate and Rhythm: Normal rate.      Pulses: Normal pulses.   Pulmonary:      Effort: No respiratory distress.      Breath sounds: Normal breath sounds. No wheezing.   Abdominal:      General: Bowel sounds are normal. There is no distension.      Palpations: Abdomen is soft.      Tenderness: There is no abdominal tenderness.   Musculoskeletal:      Right lower leg: No edema.      Left lower leg: No edema.   Skin:     General: Skin is warm.   Neurological:      Mental Status: She is alert and oriented to person, place, and time. Mental status is at baseline.   Psychiatric:         Mood and Affect: Mood normal.             Significant Labs: All pertinent labs within the past 24 hours have been reviewed.    Significant Imaging: I have reviewed all pertinent imaging results/findings within the past 24 hours.      Assessment/Plan:      * Meningoencephalitis  - Sepsis with b/l hydronephrosis, unclear origin of infection as urine is clean. No skin cellulitis or wounds. No oral/dental infx. No PNA. IV abx and robison placed. 103F despite V+Z, Stiff neck, shaking, Alterned mental status, Alert but not following any   commands, not tracking staring blank. Neurogenic bladder w Hydroneph- no bacteria or WBC in  urine.     - Starting meningitis antibiotics. Meningitis meds started. Move to ICU. IR consult for LP. EEG ordered. ID and Neuro consult.   - LP performed on 6/8 --> Cell count/diff with 560 WBC, predominantly lymphocytes with elevated protein and glucose  - on broad spectrum meningitis antibiotics while cultures/labs return  - mental status is improving with current regimen  - appreciate ID recommendations  - EEG  suggestive of mild diffuse cerebral dysfunction. No epileptiform activity or electrographic seizures seen.  -MRI revealed no acute intracranial abnormality.  MRI L-spine with degenerative disc disease.  -de-escalated to acyclovir alone per ID recommendations.  Plan for repeat LP   Patient is alert time 3 today,with no sign of neck stiffness,will monitor,while is on acyclovir.updated family.will have repeat LP with autoimmune hepatitis and fungal and EEG.  LP is repeated on 6.14.24,afebrile at this time.    Hydroureteronephrosis  Urology recommended no acute intervention given no evidence of UTI/ANITA.  voiding trial today    Fever, unknown origin  - suspect due to meningitis  -id on board.  Fever curve improved      HLD (hyperlipidemia)  Resume statin.      DM (diabetes mellitus)  Patient's FSGs are controlled on current medication regimen.  Last A1c reviewed-   Lab Results   Component Value Date    HGBA1C 8.0 (H) 06/08/2024     Most recent fingerstick glucose reviewed-   Recent Labs   Lab 06/10/24  1753 06/10/24  2007 06/11/24  0835 06/11/24  1151   POCTGLUCOSE 224* 210* 230* 247*       Current correctional scale  Low  Maintain anti-hyperglycemic dose as follows-   Antihyperglycemics (From admission, onward)      Start     Stop Route Frequency Ordered    06/10/24 2100  insulin glargine U-100 (Lantus) pen 6 Units         -- SubQ 2 times daily 06/10/24 1243    06/09/24 1645  insulin aspart U-100 pen 5 Units         -- SubQ 3 times daily with meals 06/09/24 1209    06/09/24 1308  insulin aspart U-100 pen 1-10  Units         -- SubQ Before meals & nightly PRN 06/09/24 1209          Hold Oral hypoglycemics while patient is in the hospital.  A1c 8.  Monitor blood glucose on Decadron    HTN (hypertension)  Chronic, controlled. Latest blood pressure and vitals reviewed-     Temp:  [97.9 °F (36.6 °C)-98.9 °F (37.2 °C)]   Pulse:  []   Resp:  [16-23]   BP: (120-179)/()   SpO2:  [95 %-100 %] .   Home meds for hypertension were reviewed and noted below.   Hypertension Medications               lisinopriL (PRINIVIL,ZESTRIL) 20 MG tablet Take 20 mg by mouth 2 (two) times a day.    metoprolol succinate (TOPROL-XL) 200 MG 24 hr tablet Take 200 mg by mouth once daily.            While in the hospital, will manage blood pressure as follows; Continue home antihypertensive regimen    Will utilize p.r.n. blood pressure medication only if patient's blood pressure greater than 180/110 and she develops symptoms such as worsening chest pain or shortness of breath.      VTE Risk Mitigation (From admission, onward)           Ordered     enoxaparin injection 40 mg  Every 24 hours         06/15/24 0823     IP VTE HIGH RISK PATIENT  Once         06/07/24 1857     Place sequential compression device  Until discontinued         06/07/24 1857                    Discharge Planning   PHYLICIA:      Code Status: Full Code   Is the patient medically ready for discharge?:     Reason for patient still in hospital (select all that apply): Patient trending condition  Discharge Plan A: Home   Discharge Delays: None known at this time              Gill Cox MD  Department of Hospital Medicine   [facility] [place] [facility]

## 2024-06-15 NOTE — NURSING
[facility] [facility] [place] 4 [place]      6/15/2024       Skin assessed on: Q Shift      [x] No Pressure Injuries Present    [x]Prevention Measures Documented    [] Yes LDA  for Pressure Injury Previously documented     [] Yes New Pressure Injury Discovered   [] LDA for New Pressure Injury Added      Attending RN:  Jossie Lee RN     Second RN: ANIRUDH Rothman

## 2024-06-16 LAB
ALBUMIN SERPL BCP-MCNC: 2.3 G/DL (ref 3.5–5.2)
ALP SERPL-CCNC: 40 U/L (ref 55–135)
ALT SERPL W/O P-5'-P-CCNC: 15 U/L (ref 10–44)
ANION GAP SERPL CALC-SCNC: 9 MMOL/L (ref 8–16)
AST SERPL-CCNC: 13 U/L (ref 10–40)
BACTERIA BLD CULT: NORMAL
BACTERIA BLD CULT: NORMAL
BASOPHILS # BLD AUTO: 0.01 K/UL (ref 0–0.2)
BASOPHILS NFR BLD: 0.1 % (ref 0–1.9)
BILIRUB SERPL-MCNC: 0.6 MG/DL (ref 0.1–1)
BUN SERPL-MCNC: 14 MG/DL (ref 8–23)
CALCIUM SERPL-MCNC: 8.7 MG/DL (ref 8.7–10.5)
CHLORIDE SERPL-SCNC: 99 MMOL/L (ref 95–110)
CO2 SERPL-SCNC: 26 MMOL/L (ref 23–29)
CREAT SERPL-MCNC: 1.2 MG/DL (ref 0.5–1.4)
DIFFERENTIAL METHOD BLD: ABNORMAL
EOSINOPHIL # BLD AUTO: 0.2 K/UL (ref 0–0.5)
EOSINOPHIL NFR BLD: 2.5 % (ref 0–8)
ERYTHROCYTE [DISTWIDTH] IN BLOOD BY AUTOMATED COUNT: 13.2 % (ref 11.5–14.5)
EST. GFR  (NO RACE VARIABLE): 47 ML/MIN/1.73 M^2
GLUCOSE SERPL-MCNC: 109 MG/DL (ref 70–110)
HCT VFR BLD AUTO: 31.9 % (ref 37–48.5)
HGB BLD-MCNC: 10.9 G/DL (ref 12–16)
IMM GRANULOCYTES # BLD AUTO: 0.05 K/UL (ref 0–0.04)
IMM GRANULOCYTES NFR BLD AUTO: 0.7 % (ref 0–0.5)
LYMPHOCYTES # BLD AUTO: 1.4 K/UL (ref 1–4.8)
LYMPHOCYTES NFR BLD: 18.9 % (ref 18–48)
MAGNESIUM SERPL-MCNC: 1.5 MG/DL (ref 1.6–2.6)
MCH RBC QN AUTO: 28.6 PG (ref 27–31)
MCHC RBC AUTO-ENTMCNC: 34.2 G/DL (ref 32–36)
MCV RBC AUTO: 84 FL (ref 82–98)
MONOCYTES # BLD AUTO: 1.1 K/UL (ref 0.3–1)
MONOCYTES NFR BLD: 14.3 % (ref 4–15)
NEUTROPHILS # BLD AUTO: 4.8 K/UL (ref 1.8–7.7)
NEUTROPHILS NFR BLD: 63.5 % (ref 38–73)
NRBC BLD-RTO: 0 /100 WBC
PLATELET # BLD AUTO: 225 K/UL (ref 150–450)
PMV BLD AUTO: 9.4 FL (ref 9.2–12.9)
POCT GLUCOSE: 109 MG/DL (ref 70–110)
POCT GLUCOSE: 145 MG/DL (ref 70–110)
POCT GLUCOSE: 150 MG/DL (ref 70–110)
POCT GLUCOSE: 91 MG/DL (ref 70–110)
POTASSIUM SERPL-SCNC: 3.2 MMOL/L (ref 3.5–5.1)
PROT SERPL-MCNC: 5.6 G/DL (ref 6–8.4)
RBC # BLD AUTO: 3.81 M/UL (ref 4–5.4)
SODIUM SERPL-SCNC: 134 MMOL/L (ref 136–145)
WBC # BLD AUTO: 7.56 K/UL (ref 3.9–12.7)

## 2024-06-16 PROCEDURE — A4216 STERILE WATER/SALINE, 10 ML: HCPCS | Performed by: STUDENT IN AN ORGANIZED HEALTH CARE EDUCATION/TRAINING PROGRAM

## 2024-06-16 PROCEDURE — 25000003 PHARM REV CODE 250: Performed by: INTERNAL MEDICINE

## 2024-06-16 PROCEDURE — 21400001 HC TELEMETRY ROOM

## 2024-06-16 PROCEDURE — 25000003 PHARM REV CODE 250: Performed by: STUDENT IN AN ORGANIZED HEALTH CARE EDUCATION/TRAINING PROGRAM

## 2024-06-16 PROCEDURE — 63600175 PHARM REV CODE 636 W HCPCS: Performed by: STUDENT IN AN ORGANIZED HEALTH CARE EDUCATION/TRAINING PROGRAM

## 2024-06-16 PROCEDURE — 25000003 PHARM REV CODE 250: Performed by: HOSPITALIST

## 2024-06-16 PROCEDURE — 83735 ASSAY OF MAGNESIUM: CPT | Performed by: STUDENT IN AN ORGANIZED HEALTH CARE EDUCATION/TRAINING PROGRAM

## 2024-06-16 PROCEDURE — 63600175 PHARM REV CODE 636 W HCPCS: Performed by: HOSPITALIST

## 2024-06-16 PROCEDURE — 80053 COMPREHEN METABOLIC PANEL: CPT | Performed by: STUDENT IN AN ORGANIZED HEALTH CARE EDUCATION/TRAINING PROGRAM

## 2024-06-16 PROCEDURE — 36415 COLL VENOUS BLD VENIPUNCTURE: CPT | Performed by: STUDENT IN AN ORGANIZED HEALTH CARE EDUCATION/TRAINING PROGRAM

## 2024-06-16 PROCEDURE — 99233 SBSQ HOSP IP/OBS HIGH 50: CPT | Mod: ,,, | Performed by: STUDENT IN AN ORGANIZED HEALTH CARE EDUCATION/TRAINING PROGRAM

## 2024-06-16 PROCEDURE — 85025 COMPLETE CBC W/AUTO DIFF WBC: CPT | Performed by: STUDENT IN AN ORGANIZED HEALTH CARE EDUCATION/TRAINING PROGRAM

## 2024-06-16 RX ORDER — POTASSIUM CHLORIDE 20 MEQ/1
40 TABLET, EXTENDED RELEASE ORAL ONCE
Status: COMPLETED | OUTPATIENT
Start: 2024-06-16 | End: 2024-06-16

## 2024-06-16 RX ORDER — TALC
500 POWDER (GRAM) TOPICAL DAILY
Status: DISCONTINUED | OUTPATIENT
Start: 2024-06-16 | End: 2024-06-17 | Stop reason: HOSPADM

## 2024-06-16 RX ADMIN — POLYETHYLENE GLYCOL 3350 17 G: 17 POWDER, FOR SOLUTION ORAL at 09:06

## 2024-06-16 RX ADMIN — INSULIN ASPART 5 UNITS: 100 INJECTION, SOLUTION INTRAVENOUS; SUBCUTANEOUS at 08:06

## 2024-06-16 RX ADMIN — METOPROLOL SUCCINATE 100 MG: 50 TABLET, EXTENDED RELEASE ORAL at 09:06

## 2024-06-16 RX ADMIN — ENOXAPARIN SODIUM 40 MG: 40 INJECTION SUBCUTANEOUS at 05:06

## 2024-06-16 RX ADMIN — ACYCLOVIR SODIUM 570 MG: 50 INJECTION, SOLUTION INTRAVENOUS at 05:06

## 2024-06-16 RX ADMIN — Medication 10 ML: at 12:06

## 2024-06-16 RX ADMIN — AMLODIPINE BESYLATE 10 MG: 5 TABLET ORAL at 09:06

## 2024-06-16 RX ADMIN — Medication 500 MG: at 09:06

## 2024-06-16 RX ADMIN — POTASSIUM CHLORIDE 40 MEQ: 1500 TABLET, EXTENDED RELEASE ORAL at 09:06

## 2024-06-16 RX ADMIN — Medication 10 ML: at 06:06

## 2024-06-16 RX ADMIN — CETIRIZINE HYDROCHLORIDE 10 MG: 10 TABLET, FILM COATED ORAL at 09:06

## 2024-06-16 RX ADMIN — Medication 10 ML: at 05:06

## 2024-06-16 RX ADMIN — INSULIN ASPART 5 UNITS: 100 INJECTION, SOLUTION INTRAVENOUS; SUBCUTANEOUS at 05:06

## 2024-06-16 NOTE — NURSING
Nurses Note -- 4 Eyes      6/15/2024   8:26 PM      Skin assessed during: Q Shift Change      [x] No Pressure Injuries Present    [x]Prevention Measures Documented      [] Yes- Altered Skin Integrity Present or Discovered   [] LDA Added if Not in Epic (Describe Wound)   [] New Altered Skin Integrity was Present on Admit and Documented in LDA   [] Wound Image Taken      Attending Nurse:  Lorna Wheatley RN/Staff Member:  Jossie

## 2024-06-16 NOTE — PLAN OF CARE
Problem: Adult Inpatient Plan of Care  Goal: Absence of Hospital-Acquired Illness or Injury  Intervention: Identify and Manage Fall Risk  Flowsheets (Taken 6/16/2024 0323)  Safety Promotion/Fall Prevention:   assistive device/personal item within reach   bedside commode chair   commode/urinal/bedpan at bedside   diversional activities provided   Fall Risk reviewed with patient/family   family to remain at bedside   lighting adjusted   medications reviewed   nonskid shoes/socks when out of bed   room near unit station   side rails raised x 2   instructed to call staff for mobility  Intervention: Prevent Skin Injury  Flowsheets (Taken 6/16/2024 0323)  Body Position: position changed independently  Intervention: Prevent Infection  Flowsheets (Taken 6/16/2024 0323)  Infection Prevention:   environmental surveillance performed   hand hygiene promoted   single patient room provided  Goal: Optimal Comfort and Wellbeing  Intervention: Monitor Pain and Promote Comfort  Flowsheets (Taken 6/16/2024 0323)  Pain Management Interventions: care clustered  Intervention: Provide Person-Centered Care  Flowsheets (Taken 6/16/2024 0323)  Trust Relationship/Rapport:   care explained   choices provided     Problem: Skin Injury Risk Increased  Goal: Skin Health and Integrity  Intervention: Optimize Skin Protection  Flowsheets (Taken 6/16/2024 0323)  Activity Management: Rolling - L1  Head of Bed (HOB) Positioning: HOB elevated     Problem: Fall Injury Risk  Goal: Absence of Fall and Fall-Related Injury  Intervention: Identify and Manage Contributors  Flowsheets (Taken 6/16/2024 0323)  Self-Care Promotion: independence encouraged  Medication Review/Management: medications reviewed  Intervention: Promote Injury-Free Environment  Flowsheets (Taken 6/16/2024 0323)  Safety Promotion/Fall Prevention:   assistive device/personal item within reach   bedside commode chair   commode/urinal/bedpan at bedside   diversional activities provided    Fall Risk reviewed with patient/family   family to remain at bedside   lighting adjusted   medications reviewed   nonskid shoes/socks when out of bed   room near unit station   side rails raised x 2   instructed to call staff for mobility     Problem: Wound  Goal: Optimal Coping  Intervention: Support Patient and Family Response  Flowsheets (Taken 6/16/2024 0323)  Supportive Measures: active listening utilized  Goal: Optimal Functional Ability  Intervention: Optimize Functional Ability  Flowsheets (Taken 6/16/2024 0323)  Activity Management: Rolling - L1  Activity Assistance Provided: assistance, 1 person  Goal: Absence of Infection Signs and Symptoms  Intervention: Prevent or Manage Infection  Flowsheets (Taken 6/16/2024 0323)  Infection Management: aseptic technique maintained  Isolation Precautions:   precautions maintained   droplet  Goal: Optimal Pain Control and Function  Intervention: Prevent or Manage Pain  Flowsheets (Taken 6/16/2024 0323)  Sleep/Rest Enhancement: awakenings minimized  Pain Management Interventions: care clustered  Goal: Skin Health and Integrity  Intervention: Optimize Skin Protection  Flowsheets (Taken 6/16/2024 0323)  Activity Management: Rolling - L1  Head of Bed (HOB) Positioning: HOB elevated  Goal: Optimal Wound Healing  Intervention: Promote Wound Healing  Flowsheets (Taken 6/16/2024 0323)  Sleep/Rest Enhancement: awakenings minimized

## 2024-06-16 NOTE — SUBJECTIVE & OBJECTIVE
Interval History: feeling improved today. States her headache and neck pain is resolved. No further fevers. Walking with assistance. Able to drink ensure and eat fruit. Still poor appetite. Anticipating going home tomorrow.     Review of Systems   Constitutional:  Positive for appetite change and fatigue. Negative for fever.   Respiratory: Negative.     Gastrointestinal:  Negative for diarrhea, nausea and vomiting.   Genitourinary: Negative.    Musculoskeletal: Negative.    Skin: Negative.    Neurological:  Positive for weakness. Negative for headaches.     Objective:     Vital Signs (Most Recent):  Temp: 98.5 °F (36.9 °C) (06/16/24 0719)  Pulse: 88 (06/16/24 0730)  Resp: 18 (06/16/24 0719)  BP: 132/63 (06/16/24 0719)  SpO2: 95 % (06/16/24 0719) Vital Signs (24h Range):  Temp:  [98.5 °F (36.9 °C)-99.5 °F (37.5 °C)] 98.5 °F (36.9 °C)  Pulse:  [] 88  Resp:  [16-20] 18  SpO2:  [94 %-100 %] 95 %  BP: (101-179)/(51-79) 132/63     Weight: 79 kg (174 lb 2.6 oz)  Body mass index is 28.98 kg/m².    Estimated Creatinine Clearance: 40.8 mL/min (based on SCr of 1.2 mg/dL).     Physical Exam  Vitals and nursing note reviewed.   Constitutional:       Appearance: She is not ill-appearing.   HENT:      Head: Normocephalic.      Mouth/Throat:      Mouth: Mucous membranes are moist.      Pharynx: Oropharynx is clear.   Pulmonary:      Effort: Pulmonary effort is normal. No respiratory distress.   Abdominal:      General: There is no distension.      Palpations: Abdomen is soft.   Musculoskeletal:         General: No swelling. Normal range of motion.      Cervical back: No rigidity or tenderness.   Lymphadenopathy:      Cervical: No cervical adenopathy.   Skin:     General: Skin is warm and dry.      Findings: No rash.   Neurological:      Mental Status: She is alert and oriented to person, place, and time.          Significant Labs:   Microbiology Results (last 7 days)       Procedure Component Value Units Date/Time    Blood  culture [[id number]] Collected: 06/14/24 0804    Order Status: Completed Specimen: Blood from Peripheral, Hand, Left Updated: 06/16/24 0903     Blood Culture, Routine No Growth to date      No Growth to date      No Growth to date    Blood culture [[id number]] Collected: 06/14/24 0804    Order Status: Completed Specimen: Blood from Peripheral, Antecubital, Left Updated: 06/16/24 0903     Blood Culture, Routine No Growth to date      No Growth to date      No Growth to date    CSF culture [[id number]] Collected: 06/14/24 1500    Order Status: Completed Specimen: CSF (Spinal Fluid) from CSF Tap, Tube 1 Updated: 06/16/24 0811     CSF CULTURE No Growth to date     Gram Stain Result Cytospin indicates:      Many WBC's      No organisms seen    Blood culture [[id number]] Collected: 06/12/24 1553    Order Status: Completed Specimen: Blood from Peripheral, Wrist, Left Updated: 06/15/24 1703     Blood Culture, Routine No Growth to date      No Growth to date      No Growth to date      No Growth to date    Blood culture [[id number]] Collected: 06/12/24 1553    Order Status: Completed Specimen: Blood from Peripheral, Forearm, Left Updated: 06/15/24 1703     Blood Culture, Routine No Growth to date      No Growth to date      No Growth to date      No Growth to date    AFB Culture & Smear [[id number]]     Order Status: Completed Specimen: CSF (Spinal Fluid) from CSF Tap, Tube 2     CSF culture [[id number]] Collected: 06/08/24 1247    Order Status: Completed Specimen: CSF (Spinal Fluid) from CSF Tap, Tube 1 Updated: 06/12/24 0735     CSF CULTURE No Growth     Gram Stain Result Cytospin indicates:      Many WBC's      No organisms seen    Blood culture [[id number]] Collected: 06/08/24 0542    Order Status: Completed Specimen: Blood from Peripheral, Hand, Right Updated: 06/12/24 0703     Blood Culture, Routine No Growth after 4 days.    Blood culture x two cultures. Draw prior to antibiotics. [[id number]] Collected: 06/07/24 1124     Order Status: Completed Specimen: Blood from Peripheral, Antecubital, Left Updated: 06/11/24 1303     Blood Culture, Routine No Growth after 4 days.    Narrative:      Aerobic and anaerobic    Blood culture x two cultures. Draw prior to antibiotics. [[id number]] Collected: 06/07/24 1138    Order Status: Completed Specimen: Blood from Peripheral, Upper Arm, Right Updated: 06/11/24 1303     Blood Culture, Routine No Growth after 4 days.    Narrative:      Aerobic and anaerobic    Cryptococcal antigen, CSF [[id number]] Collected: 06/08/24 1247    Order Status: Completed Specimen: CSF (Spinal Fluid) from CSF Tap, Tube 1 Updated: 06/09/24 1722     Crypto Ag, CSF Negative            Significant Imaging: I have reviewed all pertinent imaging results/findings within the past 24 hours.

## 2024-06-16 NOTE — PROGRESS NOTES
[facility] [place] - Observation  Infectious Disease  Progress Note    Patient Name: Sussy Fulton  MRN: [id number]  Admission Date: 6/7/2024  Length of Stay: 9 days  Attending Physician: Gill Cox, *  Primary Care Provider: Arlen Rivera MD    Isolation Status: No active isolations  Assessment/Plan:      Other  Fever, unknown origin  Sussy Fulton is a 79 year old woman who was admitted for weakness, malaise and fevers x 1 day.  UA bland, no leukocytosis, CTH wnl, CT a/p with hydroureteronephrosis (no obstructing stones s/p robison) otherwise unremarkable. UA neg x 2. Noted to have neck pain and worsening encephalopathy concerning for meningitis. Broadened to vanc, ceftriaxone, ampicillin and acyclovir. LP revealed cloudy csf (wbc 500/ lymp / prtn 223/ gluc 100). Ampho started due to concern for fungal cns infection, culture no growth to day. Recent URI may be source of cns infection, LP results more consistent with viral etiology. CSF cultures and crypto antigen negative. CSF encephalitis/meningitis PCR panel negative, HSV 1/2 PCR negative, VZV negative. HIV neg. EEG w/o seizures. RIP neg. Mental status appears to be waxing and waning which could suggest element of hospital delirium. Viral encephalitis high on differential, but would consider other non-infectious causes given stalled improvement and recurrence of fevers now that she is off steroids. Ceftriaxone could be causing some neurotoxicity, so stopped given negative bacterial meningitis work up. Repeat LP ( 87% lymph, [id number], protein 126, glucose 67). Repeat HSV, ME, culture, autoimmune and paraneoplastic panel sent. She seems improved over the last three days. 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     Recommendations  - continue meningitis dosed acyclovir, anticipating treating empirically for 21 day course, end 6/28/24  - Karius and West Nile IgM and IgG, repeat HSV PCR pending - will follow up in  clinic   - see below for OPAT note    Outpatient Antibiotic Therapy Plan:    Please send referral to [facility].    1) Infection: viral meningitis     2) Discharge Antibiotics: IV acyclovir     Intravenous antibiotics:  (Renally dosed) 570 mg IV q 12 hours diluted in 250 ML D5W piggyback     3) Therapy Duration:  21 days    Estimated end date of IV antibiotics: 06/28/24    4) Outpatient Weekly Labs:    Order the following labs to be drawn on Mondays and Thursdays:   CBC  CMP   CRP    5) Fax Lab Results to Infectious Diseases Provider: [facility] Fax Number: [phone]    6) Outpatient Infectious Diseases Follow-up    Follow-up appointment will be arranged by the ID clinic and will be found in the patient's appointments tab.    Prior to discharge, please ensure the patient's follow-up has been scheduled.    If there is still no follow-up scheduled prior to discharge, please send an [employer] message to [facility] or Call Infectious Diseases Dept.              Above discussed with primary team.     Time: 50 minutes   50% of time spent on face-to-face counseling and coordination of care. Counseling included review of test results, diagnosis, and treatment plan with patient and/or family.  I have reviewed hospital notes from HM service and other specialty providers as well as outside medical records. I have also reviewed CBC, CMP/BMP,  cultures and imaging with my interpretation as documented. Patient is high risk of morbidity, on antibiotics requiring intensive monitoring for toxicity.     Anticipated Disposition: TBD    Thank you for your consult. I will sign off. Please contact us if you have any additional questions.    Marla Becerril MD  Infectious Disease  [facility] - Observation    Subjective:     Principal Problem:Meningoencephalitis    HPI: 80y/o F pt w hx of HTN, HLD, T2DM  presented to the ER with family due to weakness x1 day and not feeling well  "and was admitted 6/7 for presumed sepsis with unclear source.    Pt denied nausea, vomiting, diarrhea, shortness breath or cough.  States that she started feeling bad after eating Chinese food with her family last night.  Denies sick contacts.      In the ED  pt was febrile to 103°.  Labs remarkable for wbc 11.8, LA wnl, UA bland,  COVID neg.  Started on empiric vanc and Zosyn. CT head unremarkable. CT of the abd/pel revealed: "Bilateral hydroureteronephrosis and moderate urinary bladder distension.  No nephrolithiasis.  Findings may be related to obstructive uropathy." Urology consulted and recommended robison placement and continued IV antibiotics.  Culture sent from the Robison placed and pending.    Hospital course c/b fevers despite bs abx and worsening acute encephalopathy prompting transfer to icu and broadening of abx to vacn, ceftriaxone, ampicillin and acyclovir for meningitis coverage.    ID consulted for: "103F despite V+Z, Stiff neck, shaking, Alterned mental status, Alert but not following any commands, not tracking staring blank. ?neuorgenic bladder w Hydroneph- no bacterio or WBC in urine "    Pt reports recent sinusitis 1 wk prior for which she received a steroid injection and albuterol. Notes new b/l lower back pain on day of presentation, but otherwise no new symptoms. Denies sick contacts, animal exposures, recent travel or recent surgeries/ dental procedures. Does not work. Lives with her  who is a dialysis pt and her son. Denies having eaten any unpasteurized dairy products. Ate chinese food tues, but nobody else got sick.   Interval History: feeling improved today. States her headache and neck pain is resolved. No further fevers. Walking with assistance. Able to drink ensure and eat fruit. Still poor appetite. Anticipating going home tomorrow.     Review of Systems   Constitutional:  Positive for appetite change and fatigue. Negative for fever.   Respiratory: Negative.     Gastrointestinal: "  Negative for diarrhea, nausea and vomiting.   Genitourinary: Negative.    Musculoskeletal: Negative.    Skin: Negative.    Neurological:  Positive for weakness. Negative for headaches.     Objective:     Vital Signs (Most Recent):  Temp: 98.5 °F (36.9 °C) (06/16/24 0719)  Pulse: 88 (06/16/24 0730)  Resp: 18 (06/16/24 0719)  BP: 132/63 (06/16/24 0719)  SpO2: 95 % (06/16/24 0719) Vital Signs (24h Range):  Temp:  [98.5 °F (36.9 °C)-99.5 °F (37.5 °C)] 98.5 °F (36.9 °C)  Pulse:  [] 88  Resp:  [16-20] 18  SpO2:  [94 %-100 %] 95 %  BP: (101-179)/(51-79) 132/63     Weight: 79 kg (174 lb 2.6 oz)  Body mass index is 28.98 kg/m².    Estimated Creatinine Clearance: 40.8 mL/min (based on SCr of 1.2 mg/dL).     Physical Exam  Vitals and nursing note reviewed.   Constitutional:       Appearance: She is not ill-appearing.   HENT:      Head: Normocephalic.      Mouth/Throat:      Mouth: Mucous membranes are moist.      Pharynx: Oropharynx is clear.   Pulmonary:      Effort: Pulmonary effort is normal. No respiratory distress.   Abdominal:      General: There is no distension.      Palpations: Abdomen is soft.   Musculoskeletal:         General: No swelling. Normal range of motion.      Cervical back: No rigidity or tenderness.   Lymphadenopathy:      Cervical: No cervical adenopathy.   Skin:     General: Skin is warm and dry.      Findings: No rash.   Neurological:      Mental Status: She is alert and oriented to person, place, and time.          Significant Labs:   Microbiology Results (last 7 days)       Procedure Component Value Units Date/Time    Blood culture [[id number]] Collected: 06/14/24 0804    Order Status: Completed Specimen: Blood from Peripheral, Hand, Left Updated: 06/16/24 0903     Blood Culture, Routine No Growth to date      No Growth to date      No Growth to date    Blood culture [[id number]] Collected: 06/14/24 0804    Order Status: Completed Specimen: Blood from Peripheral, Antecubital, Left Updated:  06/16/24 0903     Blood Culture, Routine No Growth to date      No Growth to date      No Growth to date    CSF culture [[id number]] Collected: 06/14/24 1500    Order Status: Completed Specimen: CSF (Spinal Fluid) from CSF Tap, Tube 1 Updated: 06/16/24 0811     CSF CULTURE No Growth to date     Gram Stain Result Cytospin indicates:      Many WBC's      No organisms seen    Blood culture [[id number]] Collected: 06/12/24 1553    Order Status: Completed Specimen: Blood from Peripheral, Wrist, Left Updated: 06/15/24 1703     Blood Culture, Routine No Growth to date      No Growth to date      No Growth to date      No Growth to date    Blood culture [[id number]] Collected: 06/12/24 1553    Order Status: Completed Specimen: Blood from Peripheral, Forearm, Left Updated: 06/15/24 1703     Blood Culture, Routine No Growth to date      No Growth to date      No Growth to date      No Growth to date    AFB Culture & Smear [[id number]]     Order Status: Completed Specimen: CSF (Spinal Fluid) from CSF Tap, Tube 2     CSF culture [[id number]] Collected: 06/08/24 1247    Order Status: Completed Specimen: CSF (Spinal Fluid) from CSF Tap, Tube 1 Updated: 06/12/24 0735     CSF CULTURE No Growth     Gram Stain Result Cytospin indicates:      Many WBC's      No organisms seen    Blood culture [[id number]] Collected: 06/08/24 0542    Order Status: Completed Specimen: Blood from Peripheral, Hand, Right Updated: 06/12/24 0703     Blood Culture, Routine No Growth after 4 days.    Blood culture x two cultures. Draw prior to antibiotics. [[id number]] Collected: 06/07/24 1124    Order Status: Completed Specimen: Blood from Peripheral, Antecubital, Left Updated: 06/11/24 1303     Blood Culture, Routine No Growth after 4 days.    Narrative:      Aerobic and anaerobic    Blood culture x two cultures. Draw prior to antibiotics. [[id number]] Collected: 06/07/24 1138    Order Status: Completed Specimen: Blood from Peripheral, Upper Arm, Right  Updated: 06/11/24 1303     Blood Culture, Routine No Growth after 4 days.    Narrative:      Aerobic and anaerobic    Cryptococcal antigen, CSF [[id number]] Collected: 06/08/24 1247    Order Status: Completed Specimen: CSF (Spinal Fluid) from CSF Tap, Tube 1 Updated: 06/09/24 1722     Crypto Ag, CSF Negative            Significant Imaging: I have reviewed all pertinent imaging results/findings within the past 24 hours.

## 2024-06-16 NOTE — NURSING
Reported off to oncoming nurse, patient resting in bed, aaox3. Patient can make needs known to staff, max assist required for adls and transfers, no acute distress noted, safety precautions maintained. Droplet Precautions in progress.       Chart check completed.

## 2024-06-16 NOTE — NURSING
Patient moved to room 332. Patient wanted a larger room. Son at bedside. He is to spend the night.

## 2024-06-16 NOTE — PROGRESS NOTES
[facility] Medicine  Progress Note    Patient Name: Sussy Fulton  MRN: [id number]  Patient Class: IP- Inpatient   Admission Date: 6/7/2024  Length of Stay: 9 days  Attending Physician: Gill Cox, *  Primary Care Provider: Arlen Rivera MD        Subjective:     Principal Problem:Meningoencephalitis        HPI:  79 y.o.  female with a past medical history significant for GERD, hypertension, hyperlipidemia and non-insulin-dependent type 2 diabetes presents to the ER with family due to weakness x1 day and not feeling well.  Patient denies any nausea any vomiting any diarrhea.  Patient denies any shortness breath or cough.  States that she started feeling bad after eating Chinese food with her family last night.  No one else in the family is feeling sick.  While she was in the ER was noted to have a temperature of a 103°.  Labs noted for white count of 11.8.  Lactic was normal.  Troponins were negative.  Urine was negative for any UTI.  COVID was negative chemistry was overall unremarkable.  Cultures were sent and she was started on vanc and Zosyn.  We were consulted for further evaluation and management of fever of unknown origin.  CT head was done and also negative for any mass or bleed.    On exam patient was noted to be tender on her right flank.  CT of the abdomen and pelvis with IV contrast was ordered for possible stone versus pyelo versus abscess.  CT of the abdomen and pelvis noted: Impression: Bilateral hydroureteronephrosis and moderate urinary bladder distension.  No nephrolithiasis.  Findings may be related to obstructive uropathy. Hepatic steatosis. Cholelithiasis and or a small gallbladder sludge.Small hiatal hernia. Consult urology was placed.  I contacted Urology to let them know the consulted to discuss possibilities of an infected stone.  Urology reviewed the report with me and no stone was mentioned.  Recommended Muhammad placement and  continued IV antibiotics.  We will follow up with her.  Culture sent from the Muhammad placed and pending.      Overview/Hospital Course:  79-year-old  female with past medical history of hypertension, hyperlipidemia, diabetes, GERD who was admitted for Sepsis likely secondary to meningoencephalitis.  Acute metabolic encephalopathy.  CT with b/l hydronephrosis.  Patient was initiated on empiric therapy for meningitis given stiff neck and AMS and moved to ICU.  ID and Neurology were consulted.  status post IR guided LP with 560 WBC and 75% lymphocytes - concerning for viral or fungal. Elevated Protein 223. On Amphotericin B, vanc, ceftriaxone, ampicillin, acylocivir and dexamethasone. Awaiting further culture data.  DC amphotericin and Decadron per ID recs.  Mental status improving.  MRI negative  EEG revealed diffuse slowing suggestive of mild diffuse cerebral dysfunction. No epileptiform activity or electrographic seizures seen.  VZV VDRL and West Nile pending.  Repeat blood cultures and urinalysis with urine culture..  PT/OT on board.  Reconsulted Neurology .  Plans for repeat LP if not much improvement,  Patient is alert time 3 today,with no sign of neck stiffness,will monitor,while is on acyclovir.updated family.will have repeat LP with autoimmune hepatitis and fungal and EEG.  LP is repeated on 6.14.24,afebrile at this time.    Interval History:  T-max of 102.2° F overnight.  Urinalysis negative.  Blood cultures no growth to date.  Id plans for repeat LP if no improvement  Patient is alert time 3 today,with no sign of neck stiffness,will monitor,while is on acyclovir.updated family.  LP is repeated on 6.14.24,afebrile at this time.    Review of Systems   Constitutional: Negative.    Respiratory: Negative.     Cardiovascular: Negative.    Gastrointestinal: Negative.    Genitourinary: Negative.    Musculoskeletal: Negative.    Neurological:  Positive for weakness and headaches.     Objective:      Vital Signs (Most Recent):  Temp: 98.5 °F (36.9 °C) (06/16/24 0719)  Pulse: 88 (06/16/24 0730)  Resp: 18 (06/16/24 0719)  BP: 132/63 (06/16/24 0719)  SpO2: 95 % (06/16/24 0719) Vital Signs (24h Range):  Temp:  [98.5 °F (36.9 °C)-99.5 °F (37.5 °C)] 98.5 °F (36.9 °C)  Pulse:  [] 88  Resp:  [16-20] 18  SpO2:  [94 %-100 %] 95 %  BP: (101-179)/(51-79) 132/63     Weight: 79 kg (174 lb 2.6 oz)  Body mass index is 28.98 kg/m².    Intake/Output Summary (Last 24 hours) at 6/16/2024 1025  Last data filed at 6/16/2024 0522  Gross per 24 hour   Intake 320 ml   Output --   Net 320 ml         Physical Exam  Constitutional:       General: She is not in acute distress.     Appearance: She is normal weight.   Cardiovascular:      Rate and Rhythm: Normal rate.      Pulses: Normal pulses.   Pulmonary:      Effort: No respiratory distress.      Breath sounds: Normal breath sounds. No wheezing.   Abdominal:      General: Bowel sounds are normal. There is no distension.      Palpations: Abdomen is soft.      Tenderness: There is no abdominal tenderness.   Musculoskeletal:      Right lower leg: No edema.      Left lower leg: No edema.   Skin:     General: Skin is warm.   Neurological:      Mental Status: She is alert and oriented to person, place, and time. Mental status is at baseline.   Psychiatric:         Mood and Affect: Mood normal.             Significant Labs: All pertinent labs within the past 24 hours have been reviewed.    Significant Imaging: I have reviewed all pertinent imaging results/findings within the past 24 hours.      Assessment/Plan:      * Meningoencephalitis  - Sepsis with b/l hydronephrosis, unclear origin of infection as urine is clean. No skin cellulitis or wounds. No oral/dental infx. No PNA. IV abx and robison placed. 103F despite V+Z, Stiff neck, shaking, Alterned mental status, Alert but not following any   commands, not tracking staring blank. Neurogenic bladder w Hydroneph- no bacteria or WBC in  urine.     - Starting meningitis antibiotics. Meningitis meds started. Move to ICU. IR consult for LP. EEG ordered. ID and Neuro consult.   - LP performed on 6/8 --> Cell count/diff with 560 WBC, predominantly lymphocytes with elevated protein and glucose  - on broad spectrum meningitis antibiotics while cultures/labs return  - mental status is improving with current regimen  - appreciate ID recommendations  - EEG  suggestive of mild diffuse cerebral dysfunction. No epileptiform activity or electrographic seizures seen.  -MRI revealed no acute intracranial abnormality.  MRI L-spine with degenerative disc disease.  -de-escalated to acyclovir alone per ID recommendations.  Plan for repeat LP   Patient is alert time 3 today,with no sign of neck stiffness,will monitor,while is on acyclovir.updated family.will have repeat LP with autoimmune hepatitis and fungal and EEG.  LP is repeated on 6.14.24,afebrile at this time.    Hydroureteronephrosis  Urology recommended no acute intervention given no evidence of UTI/ANITA.  voiding trial today    Fever, unknown origin  - suspect due to meningitis  -id on board.  Fever curve improved      HLD (hyperlipidemia)  Resume statin.      Type 2 diabetes mellitus  Patient's FSGs are controlled on current medication regimen.  Last A1c reviewed-   Lab Results   Component Value Date    HGBA1C 8.0 (H) 06/08/2024     Most recent fingerstick glucose reviewed-   Recent Labs   Lab 06/10/24  1753 06/10/24  2007 06/11/24  0835 06/11/24  1151   POCTGLUCOSE 224* 210* 230* 247*       Current correctional scale  Low  Maintain anti-hyperglycemic dose as follows-   Antihyperglycemics (From admission, onward)      Start     Stop Route Frequency Ordered    06/10/24 2100  insulin glargine U-100 (Lantus) pen 6 Units         -- SubQ 2 times daily 06/10/24 1243    06/09/24 1645  insulin aspart U-100 pen 5 Units         -- SubQ 3 times daily with meals 06/09/24 1209    06/09/24 1308  insulin aspart U-100 pen  1-10 Units         -- SubQ Before meals & nightly PRN 06/09/24 1209          Hold Oral hypoglycemics while patient is in the hospital.  A1c 8.  Monitor blood glucose on Decadron    HTN (hypertension)  Chronic, controlled. Latest blood pressure and vitals reviewed-     Temp:  [97.9 °F (36.6 °C)-98.9 °F (37.2 °C)]   Pulse:  []   Resp:  [16-23]   BP: (120-179)/()   SpO2:  [95 %-100 %] .   Home meds for hypertension were reviewed and noted below.   Hypertension Medications               lisinopriL (PRINIVIL,ZESTRIL) 20 MG tablet Take 20 mg by mouth 2 (two) times a day.    metoprolol succinate (TOPROL-XL) 200 MG 24 hr tablet Take 200 mg by mouth once daily.            While in the hospital, will manage blood pressure as follows; Continue home antihypertensive regimen    Will utilize p.r.n. blood pressure medication only if patient's blood pressure greater than 180/110 and she develops symptoms such as worsening chest pain or shortness of breath.      VTE Risk Mitigation (From admission, onward)           Ordered     enoxaparin injection 40 mg  Every 24 hours         06/15/24 0823     IP VTE HIGH RISK PATIENT  Once         06/07/24 1857     Place sequential compression device  Until discontinued         06/07/24 1857                    Discharge Planning   PHYLICIA:      Code Status: Full Code   Is the patient medically ready for discharge?:     Reason for patient still in hospital (select all that apply): Patient trending condition  Discharge Plan A: Home   Discharge Delays: None known at this time              Gill Cox MD  Department of Hospital Medicine   [facility] [place] [facility]

## 2024-06-16 NOTE — SUBJECTIVE & OBJECTIVE
Interval History:  T-max of 102.2° F overnight.  Urinalysis negative.  Blood cultures no growth to date.  Id plans for repeat LP if no improvement  Patient is alert time 3 today,with no sign of neck stiffness,will monitor,while is on acyclovir.updated family.  LP is repeated on 6.14.24,afebrile at this time.    Review of Systems   Constitutional: Negative.    Respiratory: Negative.     Cardiovascular: Negative.    Gastrointestinal: Negative.    Genitourinary: Negative.    Musculoskeletal: Negative.    Neurological:  Positive for weakness and headaches.     Objective:     Vital Signs (Most Recent):  Temp: 98.5 °F (36.9 °C) (06/16/24 0719)  Pulse: 88 (06/16/24 0730)  Resp: 18 (06/16/24 0719)  BP: 132/63 (06/16/24 0719)  SpO2: 95 % (06/16/24 0719) Vital Signs (24h Range):  Temp:  [98.5 °F (36.9 °C)-99.5 °F (37.5 °C)] 98.5 °F (36.9 °C)  Pulse:  [] 88  Resp:  [16-20] 18  SpO2:  [94 %-100 %] 95 %  BP: (101-179)/(51-79) 132/63     Weight: 79 kg (174 lb 2.6 oz)  Body mass index is 28.98 kg/m².    Intake/Output Summary (Last 24 hours) at 6/16/2024 1025  Last data filed at 6/16/2024 0522  Gross per 24 hour   Intake 320 ml   Output --   Net 320 ml         Physical Exam  Constitutional:       General: She is not in acute distress.     Appearance: She is normal weight.   Cardiovascular:      Rate and Rhythm: Normal rate.      Pulses: Normal pulses.   Pulmonary:      Effort: No respiratory distress.      Breath sounds: Normal breath sounds. No wheezing.   Abdominal:      General: Bowel sounds are normal. There is no distension.      Palpations: Abdomen is soft.      Tenderness: There is no abdominal tenderness.   Musculoskeletal:      Right lower leg: No edema.      Left lower leg: No edema.   Skin:     General: Skin is warm.   Neurological:      Mental Status: She is alert and oriented to person, place, and time. Mental status is at baseline.   Psychiatric:         Mood and Affect: Mood normal.             Significant  Labs: All pertinent labs within the past 24 hours have been reviewed.    Significant Imaging: I have reviewed all pertinent imaging results/findings within the past 24 hours.

## 2024-06-16 NOTE — ASSESSMENT & PLAN NOTE
Sussy Fulton is a 79 year old woman who was admitted for weakness, malaise and fevers x 1 day.  UA bland, no leukocytosis, CTH wnl, CT a/p with hydroureteronephrosis (no obstructing stones s/p robison) otherwise unremarkable. UA neg x 2. Noted to have neck pain and worsening encephalopathy concerning for meningitis. Broadened to vanc, ceftriaxone, ampicillin and acyclovir. LP revealed cloudy csf (wbc 500/ lymp / prtn 223/ gluc 100). Ampho started due to concern for fungal cns infection, culture no growth to day. Recent URI may be source of cns infection, LP results more consistent with viral etiology. CSF cultures and crypto antigen negative. CSF encephalitis/meningitis PCR panel negative, HSV 1/2 PCR negative, VZV negative. HIV neg. EEG w/o seizures. RIP neg. Mental status appears to be waxing and waning which could suggest element of hospital delirium. Viral encephalitis high on differential, but would consider other non-infectious causes given stalled improvement and recurrence of fevers now that she is off steroids. Ceftriaxone could be causing some neurotoxicity, so stopped given negative bacterial meningitis work up. Repeat LP ( 87% lymph, [id number], protein 126, glucose 67). Repeat HSV, ME, culture, autoimmune and paraneoplastic panel sent. She seems improved over the last three days. 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     Recommendations  - continue meningitis dosed acyclovir, anticipating treating empirically for 21 day course, end 6/28/24  - Karius and West Nile IgM and IgG, repeat HSV PCR pending - will follow up in clinic   - see below for OPAT note    Outpatient Antibiotic Therapy Plan:    Please send referral to [facility].    1) Infection: viral meningitis     2) Discharge Antibiotics: IV acyclovir     Intravenous antibiotics:  (Renally dosed) 570 mg IV q 12 hours diluted in 250 ML D5W piggyback     3) Therapy Duration:   21 days    Estimated end date of IV antibiotics: 06/28/24    4) Outpatient Weekly Labs:    Order the following labs to be drawn on Mondays and Thursdays:   CBC  CMP   CRP    5) Fax Lab Results to Infectious Diseases Provider: [facility] Fax Number: [phone]    6) Outpatient Infectious Diseases Follow-up    Follow-up appointment will be arranged by the ID clinic and will be found in the patient's appointments tab.    Prior to discharge, please ensure the patient's follow-up has been scheduled.    If there is still no follow-up scheduled prior to discharge, please send an [employer] message to [facility] Clinical Pool or Call Infectious Diseases Dept.

## 2024-06-17 VITALS
HEIGHT: 65 IN | SYSTOLIC BLOOD PRESSURE: 170 MMHG | HEART RATE: 79 BPM | DIASTOLIC BLOOD PRESSURE: 84 MMHG | OXYGEN SATURATION: 99 % | BODY MASS INDEX: 29.02 KG/M2 | WEIGHT: 174.19 LBS | TEMPERATURE: 98 F | RESPIRATION RATE: 20 BRPM

## 2024-06-17 LAB
ALBUMIN SERPL BCP-MCNC: 2.4 G/DL (ref 3.5–5.2)
ALP SERPL-CCNC: 40 U/L (ref 55–135)
ALT SERPL W/O P-5'-P-CCNC: 13 U/L (ref 10–44)
ANA SER QL IF: NORMAL
ANCA AB TITR SER IF: NORMAL TITER
ANCA AB TITR SER IF: NORMAL TITER
ANION GAP SERPL CALC-SCNC: 10 MMOL/L (ref 8–16)
AST SERPL-CCNC: 13 U/L (ref 10–40)
BASOPHILS # BLD AUTO: 0.01 K/UL (ref 0–0.2)
BASOPHILS NFR BLD: 0.2 % (ref 0–1.9)
BILIRUB SERPL-MCNC: 0.5 MG/DL (ref 0.1–1)
BUN SERPL-MCNC: 12 MG/DL (ref 8–23)
CALCIUM SERPL-MCNC: 9.2 MG/DL (ref 8.7–10.5)
CHLORIDE SERPL-SCNC: 101 MMOL/L (ref 95–110)
CO2 SERPL-SCNC: 27 MMOL/L (ref 23–29)
CREAT SERPL-MCNC: 1.1 MG/DL (ref 0.5–1.4)
DIFFERENTIAL METHOD BLD: ABNORMAL
EOSINOPHIL # BLD AUTO: 0.1 K/UL (ref 0–0.5)
EOSINOPHIL NFR BLD: 2.6 % (ref 0–8)
ERYTHROCYTE [DISTWIDTH] IN BLOOD BY AUTOMATED COUNT: 13.5 % (ref 11.5–14.5)
EST. GFR  (NO RACE VARIABLE): 52 ML/MIN/1.73 M^2
FUNGUS SPEC CULT: NORMAL
GLUCOSE SERPL-MCNC: 121 MG/DL (ref 70–110)
HCT VFR BLD AUTO: 31.3 % (ref 37–48.5)
HGB BLD-MCNC: 10.5 G/DL (ref 12–16)
HSV1, PCR, CSF: NEGATIVE
HSV2, PCR, CSF: NEGATIVE
IMM GRANULOCYTES # BLD AUTO: 0.02 K/UL (ref 0–0.04)
IMM GRANULOCYTES NFR BLD AUTO: 0.4 % (ref 0–0.5)
LYMPHOCYTES # BLD AUTO: 1.6 K/UL (ref 1–4.8)
LYMPHOCYTES NFR BLD: 29.3 % (ref 18–48)
MCH RBC QN AUTO: 28.5 PG (ref 27–31)
MCHC RBC AUTO-ENTMCNC: 33.5 G/DL (ref 32–36)
MCV RBC AUTO: 85 FL (ref 82–98)
MISCELLANEOUS TEST NAME: NORMAL
MONOCYTES # BLD AUTO: 0.7 K/UL (ref 0.3–1)
MONOCYTES NFR BLD: 13 % (ref 4–15)
NEUTROPHILS # BLD AUTO: 3 K/UL (ref 1.8–7.7)
NEUTROPHILS NFR BLD: 54.5 % (ref 38–73)
NRBC BLD-RTO: 0 /100 WBC
P-ANCA TITR SER IF: NORMAL TITER
P-ANCA TITR SER IF: NORMAL TITER
PLATELET # BLD AUTO: 249 K/UL (ref 150–450)
PMV BLD AUTO: 9.4 FL (ref 9.2–12.9)
POCT GLUCOSE: 133 MG/DL (ref 70–110)
POCT GLUCOSE: 150 MG/DL (ref 70–110)
POCT GLUCOSE: 151 MG/DL (ref 70–110)
POTASSIUM SERPL-SCNC: 3.3 MMOL/L (ref 3.5–5.1)
PROT SERPL-MCNC: 5.7 G/DL (ref 6–8.4)
RBC # BLD AUTO: 3.69 M/UL (ref 4–5.4)
REFERENCE LAB: NORMAL
REFERENCE RANGE: NORMAL
SODIUM SERPL-SCNC: 138 MMOL/L (ref 136–145)
SPECIMEN TYPE: NORMAL
TEST RESULT: NORMAL
WBC # BLD AUTO: 5.46 K/UL (ref 3.9–12.7)
WEST NILE VIRUS INTERPRETATION: NORMAL
WNV IGG SER QL IA: NEGATIVE
WNV IGM SER QL IA: NEGATIVE

## 2024-06-17 PROCEDURE — 25000003 PHARM REV CODE 250: Performed by: STUDENT IN AN ORGANIZED HEALTH CARE EDUCATION/TRAINING PROGRAM

## 2024-06-17 PROCEDURE — 02HV33Z INSERTION OF INFUSION DEVICE INTO SUPERIOR VENA CAVA, PERCUTANEOUS APPROACH: ICD-10-PCS | Performed by: HOSPITALIST

## 2024-06-17 PROCEDURE — 25000003 PHARM REV CODE 250: Performed by: INTERNAL MEDICINE

## 2024-06-17 PROCEDURE — 36569 INSJ PICC 5 YR+ W/O IMAGING: CPT

## 2024-06-17 PROCEDURE — C1751 CATH, INF, PER/CENT/MIDLINE: HCPCS

## 2024-06-17 PROCEDURE — 36415 COLL VENOUS BLD VENIPUNCTURE: CPT | Performed by: STUDENT IN AN ORGANIZED HEALTH CARE EDUCATION/TRAINING PROGRAM

## 2024-06-17 PROCEDURE — 85025 COMPLETE CBC W/AUTO DIFF WBC: CPT | Performed by: STUDENT IN AN ORGANIZED HEALTH CARE EDUCATION/TRAINING PROGRAM

## 2024-06-17 PROCEDURE — 25000003 PHARM REV CODE 250: Performed by: HOSPITALIST

## 2024-06-17 PROCEDURE — A4216 STERILE WATER/SALINE, 10 ML: HCPCS | Performed by: STUDENT IN AN ORGANIZED HEALTH CARE EDUCATION/TRAINING PROGRAM

## 2024-06-17 PROCEDURE — 63600175 PHARM REV CODE 636 W HCPCS: Performed by: STUDENT IN AN ORGANIZED HEALTH CARE EDUCATION/TRAINING PROGRAM

## 2024-06-17 PROCEDURE — 80053 COMPREHEN METABOLIC PANEL: CPT | Performed by: STUDENT IN AN ORGANIZED HEALTH CARE EDUCATION/TRAINING PROGRAM

## 2024-06-17 RX ORDER — SODIUM CHLORIDE 0.9 % (FLUSH) 0.9 %
10 SYRINGE (ML) INJECTION EVERY 6 HOURS
Status: DISCONTINUED | OUTPATIENT
Start: 2024-06-17 | End: 2024-06-17 | Stop reason: HOSPADM

## 2024-06-17 RX ORDER — POTASSIUM CHLORIDE 20 MEQ/1
40 TABLET, EXTENDED RELEASE ORAL ONCE
Status: COMPLETED | OUTPATIENT
Start: 2024-06-17 | End: 2024-06-17

## 2024-06-17 RX ORDER — SODIUM CHLORIDE 0.9 % (FLUSH) 0.9 %
10 SYRINGE (ML) INJECTION
Status: DISCONTINUED | OUTPATIENT
Start: 2024-06-17 | End: 2024-06-17 | Stop reason: HOSPADM

## 2024-06-17 RX ORDER — METOPROLOL SUCCINATE 100 MG/1
100 TABLET, EXTENDED RELEASE ORAL DAILY
Qty: 90 TABLET | Refills: 0 | Status: SHIPPED | OUTPATIENT
Start: 2024-06-18 | End: 2024-09-16

## 2024-06-17 RX ORDER — BLOOD-GLUCOSE SENSOR
EACH MISCELLANEOUS
COMMUNITY
Start: 2024-05-30

## 2024-06-17 RX ORDER — AMLODIPINE BESYLATE 10 MG/1
10 TABLET ORAL DAILY
Qty: 90 TABLET | Refills: 0 | Status: SHIPPED | OUTPATIENT
Start: 2024-06-18 | End: 2024-09-16

## 2024-06-17 RX ADMIN — METOPROLOL SUCCINATE 100 MG: 50 TABLET, EXTENDED RELEASE ORAL at 08:06

## 2024-06-17 RX ADMIN — INSULIN ASPART 5 UNITS: 100 INJECTION, SOLUTION INTRAVENOUS; SUBCUTANEOUS at 11:06

## 2024-06-17 RX ADMIN — Medication 10 ML: at 12:06

## 2024-06-17 RX ADMIN — AMLODIPINE BESYLATE 10 MG: 5 TABLET ORAL at 08:06

## 2024-06-17 RX ADMIN — INSULIN ASPART 5 UNITS: 100 INJECTION, SOLUTION INTRAVENOUS; SUBCUTANEOUS at 08:06

## 2024-06-17 RX ADMIN — ACYCLOVIR SODIUM 570 MG: 50 INJECTION, SOLUTION INTRAVENOUS at 05:06

## 2024-06-17 RX ADMIN — Medication 500 MG: at 08:06

## 2024-06-17 RX ADMIN — POTASSIUM CHLORIDE 40 MEQ: 1500 TABLET, EXTENDED RELEASE ORAL at 08:06

## 2024-06-17 RX ADMIN — Medication 10 ML: at 11:06

## 2024-06-17 RX ADMIN — CETIRIZINE HYDROCHLORIDE 10 MG: 10 TABLET, FILM COATED ORAL at 08:06

## 2024-06-17 RX ADMIN — Medication 10 ML: at 05:06

## 2024-06-17 NOTE — PLAN OF CARE
[facility] [place]      HOME HEALTH ORDERS  FACE TO FACE ENCOUNTER    Patient Name: Sussy Fulton  YOB: 1945    PCP: Arlen Rivera MD   PCP Address: [address] [place] [address] [address] / [address]  PCP Phone Number: [phone]  PCP Fax: [phone]    Encounter Date: 6/7/24    Admit to Home Health    Diagnoses:  Active Hospital Problems    Diagnosis  POA    *Meningoencephalitis [G04.90]  Yes    Hydroureteronephrosis [N13.30]  Yes    Fever, unknown origin [R50.9]  Yes    HLD (hyperlipidemia) [E78.5]  Yes    Type 2 diabetes mellitus [E11.9]  Yes    HTN (hypertension) [I10]  Yes      Resolved Hospital Problems   No resolved problems to display.       Follow Up Appointments:  No future appointments.    Allergies:Review of patient's allergies indicates:  No Known Allergies    Medications: Review discharge medications with patient and family and provide education.    Current Facility-Administered Medications   Medication Dose Route Frequency Provider Last Rate Last Admin    acetaminophen suppository 650 mg  650 mg Rectal Q6H PRN Erasmo Alvarez MD   650 mg at 06/12/24 0058    acetaminophen tablet 650 mg  650 mg Oral Q4H PRN Erasmo Alvarez MD   650 mg at 06/14/24 0806    acyclovir 570 mg in dextrose 5 % (D5W) 100 mL IVPB  10 mg/kg (Ideal) Intravenous Q12H Marla Becerril MD   Stopped at 06/17/24 0609    albuterol-ipratropium 2.5 mg-0.5 mg/3 mL nebulizer solution 3 mL  3 mL Nebulization Q4H PRN Erasmo Alvarez MD        aluminum-magnesium hydroxide-simethicone 200-200-20 mg/5 mL suspension 30 mL  30 mL Oral QID PRN Erasmo Alvarez MD        amLODIPine tablet 10 mg  10 mg Oral Daily Sofia Blanchard MD   10 mg at 06/17/24 0815    cetirizine tablet 10 mg  10 mg Oral Daily Sofia Blanchard MD   10 mg at 06/17/24 0815    dextrose 10% bolus 125 mL 125 mL  12.5 g Intravenous PRN Erasmo Alvarez MD        dextrose 10% bolus 125 mL 125 mL  12.5 g Intravenous PRN Erasmo Alvarez  MD GIBSON        dextrose 10% bolus 250 mL 250 mL  25 g Intravenous PRErasmo Knox MD        dextrose 10% bolus 250 mL 250 mL  25 g Intravenous PRErasmo Knox MD        enoxaparin injection 40 mg  40 mg Subcutaneous Q24H (prophylaxis, 1700) Gill Cox MD   40 mg at 06/16/24 1705    glucagon (human recombinant) injection 1 mg  1 mg Intramuscular PRN Erasmo Alvarez MD        glucose chewable tablet 16 g  16 g Oral PRErasmo Knox MD        glucose chewable tablet 24 g  24 g Oral PRErasmo Knox MD        hydrALAZINE injection 5 mg  5 mg Intravenous Q6H PRErasmo Knox MD   5 mg at 06/12/24 1645    insulin aspart U-100 pen 1-10 Units  1-10 Units Subcutaneous QID (AC + HS) Erasmo Bowen MD   1 Units at 06/11/24 2237    insulin aspart U-100 pen 5 Units  5 Units Subcutaneous TIDWM Erasmo Alvarez MD   5 Units at 06/17/24 0814    magnesium oxide tablet 500 mg  500 mg Oral Daily Gill Cox MD   500 mg at 06/17/24 0814    melatonin tablet 6 mg  6 mg Oral Nightly PRErasmo Knox MD   6 mg at 06/15/24 2119    metoprolol succinate (TOPROL-XL) 24 hr tablet 100 mg  100 mg Oral Daily Margaret Espitia MD   100 mg at 06/17/24 0814    naloxone 0.4 mg/mL injection 0.02 mg  0.02 mg Intravenous PRErasmo Knox MD        ondansetron injection 4 mg  4 mg Intravenous Q6H PRErasmo Knox MD   4 mg at 06/09/24 1723    oxyCODONE-acetaminophen  mg per tablet 1 tablet  1 tablet Oral Q4H PRErasmo Knox MD   1 tablet at 06/15/24 2212    polyethylene glycol packet 17 g  17 g Oral Daily Erasmo Alvarez MD   17 g at 06/16/24 0930    simethicone chewable tablet 80 mg  1 tablet Oral QID PRN Erasmo Alvarez MD   80 mg at 06/09/24 2119    sodium chloride 0.9% flush 10 mL  10 mL Intravenous Q12H PRN Erasmo Alvarez MD        sodium chloride 0.9% flush 10 mL  10 mL Intravenous Q6H Erasmo Alvarez MD   10 mL at 06/17/24 0510    And    sodium chloride 0.9% flush 10  mL  10 mL Intravenous PRN Erasmo Alvarez MD        sodium chloride 0.9% flush 10 mL  10 mL Intravenous Q6H Gill Cox MD        And    sodium chloride 0.9% flush 10 mL  10 mL Intravenous PRN Gill Cox MD        sodium chloride 0.9% flush 10 mL  10 mL Intravenous Q6H Gill Cox MD        And    sodium chloride 0.9% flush 10 mL  10 mL Intravenous PRN Gill Cox MD         Current Discharge Medication List        START taking these medications    Details   amLODIPine (NORVASC) 10 MG tablet Take 1 tablet (10 mg total) by mouth once daily.  Qty: 90 tablet, Refills: 0    Comments: .      dextrose 5 % (D5W) SolP 100 mL with acyclovir 50 mg/mL Soln 790 mg Inject 790 mg into the vein every 12 (twelve) hours. for 11 days           CONTINUE these medications which have CHANGED    Details   metoprolol succinate (TOPROL-XL) 100 MG 24 hr tablet Take 1 tablet (100 mg total) by mouth once daily.  Qty: 90 tablet, Refills: 0    Comments: .           CONTINUE these medications which have NOT CHANGED    Details   atorvastatin (LIPITOR) 10 MG tablet Take 10 mg by mouth once daily. Patient doesn't know the dose      cetirizine (ZYRTEC) 10 MG tablet Take 10 mg by mouth once daily.      diclofenac sodium (VOLTAREN) 1 % Gel Apply 2 g topically once daily.  Qty: 100 g, Refills: 1    Comments: Please give 1tube      metFORMIN (GLUCOPHAGE) 500 MG tablet Take 500 mg by mouth 2 (two) times daily with meals.      omeprazole (PRILOSEC) 20 MG capsule Take 20 mg by mouth once daily.           STOP taking these medications       HYDROcodone-acetaminophen (NORCO) 5-325 mg per tablet Comments:   Reason for Stopping:         lisinopriL (PRINIVIL,ZESTRIL) 20 MG tablet Comments:   Reason for Stopping:                 I have seen and examined this patient within the last 30 days. My clinical findings that support the need for the home health skilled services and home bound status are the  following:no   Weakness/numbness causing balance and gait disturbance due to Weakness/Debility making it taxing to leave home.     Diet:   diabetic diet 2000 calorie        Referrals/ Consults  Physical Therapy to evaluate and treat. Evaluate for home safety and equipment needs; Establish/upgrade home exercise program. Perform / instruct on therapeutic exercises, gait training, transfer training, and Range of Motion.  Occupational Therapy to evaluate and treat. Evaluate home environment for safety and equipment needs. Perform/Instruct on transfers, ADL training, ROM, and therapeutic exercises.    Activities:   activity as tolerated    Nursing:   Agency to admit patient within 24 hours of hospital discharge unless specified on physician order or at patient request    SN to complete comprehensive assessment including routine vital signs. Instruct on disease process and s/s of complications to report to MD. Review/verify medication list sent home with the patient at time of discharge  and instruct patient/caregiver as needed. Frequency may be adjusted depending on start of care date.     Skilled nurse to perform up to 3 visits PRN for symptoms related to diagnosis    Notify MD if SBP > 160 or < 90; DBP > 90 or < 50; HR > 120 or < 50; Temp > 101; O2 < 88%; Other:       Ok to schedule additional visits based on staff availability and patient request on consecutive days within the home health episode.    When multiple disciplines ordered:    Start of Care occurs on Sunday - Wednesday schedule remaining discipline evaluations as ordered on separate consecutive days following the start of care.    Thursday SOC -schedule subsequent evaluations Friday and Monday the following week.     Friday - Saturday SOC - schedule subsequent discipline evaluations on consecutive days starting Monday of the following week.    For all post-discharge communication and subsequent orders please contact patient's primary care physician. If  unable to reach primary care physician or do not receive response within 30 minutes, please contact [facility]  for clinical staff order clarification    Miscellaneous   Routine Skin for Bedridden Patients: Instruct patient/caregiver to apply moisture barrier cream to all skin folds and wet areas in perineal area daily and after baths and all bowel movements.    Home Health Aide:  Nursing Twice weekly, Physical Therapy Twice weekly, and Occupational Therapy Twice weekly    Outpatient Antibiotic Therapy Plan:     Please send referral to [facility].     1) Infection: viral meningitis      2) Discharge Antibiotics: IV acyclovir      Intravenous antibiotics:  (Renally dosed) 570 mg IV q 12 hours diluted in 250 ML D5W piggyback until 6.28.24     3) Therapy Duration:       Estimated end date of IV antibiotics: 06/28/24,remove picc line on 6.28.24 after finished acyclovir      4) Outpatient Weekly Labs:     Order the following labs to be drawn on Mondays and Thursdays:   CBC  CMP   CRP     5) Fax Lab Results to Infectious Diseases Provider: Jone     [facility] Fax Number: [phone]     6) Outpatient Infectious Diseases Follow-up     Follow-up appointment will be arranged by the ID clinic and will be found in the patient's appointments tab.     Prior to discharge, please ensure the patient's follow-up has been scheduled.    If there is still no follow-up scheduled prior to discharge, please send an [employer] message to [facility] Clinical [place] or Call Infectious Diseases Dept.  Wound Care Orders  no    I certify that this patient is confined to her home and needs intermittent skilled nursing care, physical therapy, and occupational therapy.

## 2024-06-17 NOTE — NURSING
Nurses Note -- 4 Eyes      6/16/2024   8:12 PM      Skin assessed during: Q Shift Change      [x] No Pressure Injuries Present    [x]Prevention Measures Documented      [] Yes- Altered Skin Integrity Present or Discovered   [] LDA Added if Not in Epic (Describe Wound)   [] New Altered Skin Integrity was Present on Admit and Documented in LDA   [] Wound Image Taken      Attending Nurse:  Lorna Wheatley RN/Staff Member: Jossie

## 2024-06-17 NOTE — DISCHARGE SUMMARY
[facility] Medicine  Discharge Summary      Patient Name: Sussy Fulton  MRN: [id number]  [facility]: [id number]  Patient Class: IP- Inpatient  Admission Date: 6/7/2024  Hospital Length of Stay: 10 days  Discharge Date and Time:  06/17/2024 10:27 AM  Attending Physician: Gill Cox, *   Discharging Provider: Gill Cox MD  Primary Care Provider: Arlen Rivera MD    Primary Care Team: Networked reference to record PCT     HPI:   79 y.o.  female with a past medical history significant for GERD, hypertension, hyperlipidemia and non-insulin-dependent type 2 diabetes presents to the ER with family due to weakness x1 day and not feeling well.  Patient denies any nausea any vomiting any diarrhea.  Patient denies any shortness breath or cough.  States that she started feeling bad after eating Chinese food with her family last night.  No one else in the family is feeling sick.  While she was in the ER was noted to have a temperature of a 103°.  Labs noted for white count of 11.8.  Lactic was normal.  Troponins were negative.  Urine was negative for any UTI.  COVID was negative chemistry was overall unremarkable.  Cultures were sent and she was started on vanc and Zosyn.  We were consulted for further evaluation and management of fever of unknown origin.  CT head was done and also negative for any mass or bleed.    On exam patient was noted to be tender on her right flank.  CT of the abdomen and pelvis with IV contrast was ordered for possible stone versus pyelo versus abscess.  CT of the abdomen and pelvis noted: Impression: Bilateral hydroureteronephrosis and moderate urinary bladder distension.  No nephrolithiasis.  Findings may be related to obstructive uropathy. Hepatic steatosis. Cholelithiasis and or a small gallbladder sludge.Small hiatal hernia. Consult urology was placed.  I contacted Urology to let them know the consulted to discuss possibilities of  an infected stone.  Urology reviewed the report with me and no stone was mentioned.  Recommended Muhammad placement and continued IV antibiotics.  We will follow up with her.  Culture sent from the Muhammad placed and pending.      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  Acute metabolic encephalopathy.  CT with b/l hydronephrosis.  Patient was initiated on empiric therapy for meningitis given stiff neck and AMS and moved to ICU.  ID and Neurology were consulted.  status post IR guided LP with 560 WBC and 75% lymphocytes - concerning for viral or fungal. Elevated Protein 223.was on Amphotericin B, vanc, ceftriaxone, ampicillin, acylocivir and dexamethasone. Awaiting further culture data.  DC amphotericin and Decadron per ID recs.  Mental status improving.  MRI negative  EEG revealed diffuse slowing suggestive of mild diffuse cerebral dysfunction. No epileptiform activity or electrographic seizures seen.  VZV VDRL and West Nile   Repeat blood cultures and urinalysis with urine culture.no major growths.  PT/OT on board.  Reconsulted Neurology .  Plans for repeat LP if not much improvement,  Patient is alert time 3 on my evaluation,,with no sign of neck stiffness, monitored d while was on acyclovir,,while is on acyclovir.updated family, had repeat LP with autoimmune hepatitis and fungal and heppes PCR.remains afebrile and alert time 3,did well with PT,OT .  Patient was discharged home per ID recommendations with IV home infusion with Acyclovir until 6.28.24 and follow up with PCP as out patient.       Goals of Care Treatment Preferences:  Code Status: Full Code      Consults:   Consults (From admission, onward)          Status Ordering Provider     Inpatient consult to Interventional Radiology  Once        Provider:  (Not yet assigned)     Completed NADEGE GIRON     Inpatient consult to Neurology  Once        Provider:  Christiano Moran MD    Completed RON OLIVO     Inpatient consult to Telemedicine-General Neurology  Once        Provider:  Sloane Abernathy MD    Completed LIZBET ONEILL A     Inpatient consult to Infectious Diseases  Once        Provider:  (Not yet assigned)    Completed LIZBET ONEILL A     Inpatient consult to Interventional Radiology  Once        Provider:  Maurisio Adan MD    Completed LIZBET ONEILL A     Inpatient consult to Urology  Once        Provider:  Tennille Mortensen MD    Completed CIRILO RIVERA            No new Assessment & Plan notes have been filed under this hospital service since the last note was generated.  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    Specialty: Internal Medicine  Contact information:  [address] [place] [id number]  [phone]                           Patient Instructions:      Ambulatory referral/consult to Psychology   Standing Status: Future   Referral Priority: Routine Referral Type: Psychiatric   Referral Reason: Specialty Services Required   Requested Specialty: Psychology   Number of Visits Requested: 1     Activity as tolerated       Significant Diagnostic Studies: Labs: BMP:   Recent Labs   Lab 06/16/24  0630 06/17/24  0458    121*   * 138   K 3.2* 3.3*   CL 99 101   CO2 26 27  "  BUN 14 12   CREATININE 1.2 1.1   CALCIUM 8.7 9.2   MG 1.5*  --    , CMP   Recent Labs   Lab 06/16/24  0630 06/17/24  0458   * 138   K 3.2* 3.3*   CL 99 101   CO2 26 27    121*   BUN 14 12   CREATININE 1.2 1.1   CALCIUM 8.7 9.2   PROT 5.6* 5.7*   ALBUMIN 2.3* 2.4*   BILITOT 0.6 0.5   ALKPHOS 40* 40*   AST 13 13   ALT 15 13   ANIONGAP 9 10   , and CBC   Recent Labs   Lab 06/16/24  0630 06/17/24  0458   WBC 7.56 5.46   HGB 10.9* 10.5*   HCT 31.9* 31.3*    249     Microbiology: Blood Culture   Lab Results   Component Value Date    LABBLOO No Growth to date 06/14/2024    LABBLOO No Growth to date 06/14/2024    LABBLOO No Growth to date 06/14/2024    LABBLOO No Growth to date 06/14/2024    LABBLOO No Growth to date 06/14/2024    LABBLOO No Growth to date 06/14/2024    LABBLOO No Growth to date 06/14/2024    LABBLOO No Growth to date 06/14/2024    and Urine Culture  No results found for: "LABURIN"  Radiology: X-Ray: CXR: X-Ray Chest 1 View (CXR): No results found for this visit on 06/07/24. and X-Ray Chest PA and Lateral (CXR): No results found for this visit on 06/07/24.  MRI: brain     Pending Diagnostic Studies:       Procedure Component Value Units Date/Time    NAHUN [[id number]] Collected: 06/13/24 1815    Order Status: Sent Lab Status: In process Updated: 06/14/24 1011    Specimen: Blood     Anti-neutrophilic cytoplasmic antibody [[id number]] Collected: 06/13/24 1815    Order Status: Sent Lab Status: In process Updated: 06/13/24 1838    Specimen: Blood     Anti-neutrophilic cytoplasmic antibody [[id number]] Collected: 06/13/24 1815    Order Status: Sent Lab Status: In process Updated: 06/13/24 1838    Specimen: Blood     Blastomyces Antigen, Urine [[id number]] Collected: 06/14/24 1846    Order Status: Sent Lab Status: In process Updated: 06/14/24 1900    Specimen: Urine     Encephalopathy Autoimmune Evaluation, CSF [[id number]] Collected: 06/14/24 1500    Order Status: Sent Lab Status: In process " Updated: 06/14/24 1533    Specimen: CSF (Spinal Fluid) from Cerebrospinal Fluid     Freeze and Hold,  [[id number]] Collected: 06/08/24 1247    Order Status: Sent Lab Status: No result     Specimen: CSF (Spinal Fluid) from Cerebrospinal Fluid     Herpes Simplex (HSV) by PCR, CSF [[id number]] Collected: 06/14/24 1500    Order Status: Sent Lab Status: In process Updated: 06/14/24 1533    Specimen: CSF (Spinal Fluid) from Cerebrospinal Fluid     Histoplasma antigen, urine [[id number]] Collected: 06/14/24 1845    Order Status: Sent Lab Status: In process Updated: 06/14/24 1900    Specimen: Urine, Unspecified     Histoplasma/Blastomyces antigen, serum [[id number]] Collected: 06/15/24 0635    Order Status: Sent Lab Status: In process Updated: 06/15/24 0652    Specimen: Blood     Narrative:      Collection has been rescheduled by NM at 06/15/2024 04:22 Reason:   Unable to collect    IR LUMBAR PUNCTURE DIAGNOSTIC WITH IMAGING [[id number]] Resulted: 06/08/24 1310    Order Status: Sent Lab Status: In process Updated: 06/08/24 1313    Paraneoplastic Autoantibody Eval, CSF [[id number]] Collected: 06/14/24 1500    Order Status: Sent Lab Status: In process Updated: 06/14/24 1533    Specimen: CSF (Spinal Fluid) from Cerebrospinal Fluid     West Nile antibodies, IgG and IgM [[id number]] Collected: 06/12/24 1554    Order Status: Sent Lab Status: In process Updated: 06/12/24 1625    Specimen: Blood     Narrative:      Collection has been rescheduled by RBJ at 06/12/2024 05:43 Reason:   Patient Refused ANIRUDH Ronquillo  Collection has been rescheduled by RBJ at 06/12/2024 09:06 Reason:   Patient Refused ANIRUDH Gonzalez  Collection has been rescheduled by RBJ at 06/12/2024 10:19 Reason:   Ref Lab ::: Lav [facility]  Collection has been rescheduled by RBJ at 06/12/2024 11:21 Reason:   Patient unavailable with MD           Medications:  Reconciled Home Medications:      Medication List        START taking these medications      amLODIPine 10 MG  tablet  Commonly known as: NORVASC  Take 1 tablet (10 mg total) by mouth once daily.  Start taking on: June 18, 2024     dextrose 5 % (D5W) SolP 100 mL with acyclovir 50 mg/mL Soln 790 mg  Inject 790 mg into the vein every 12 (twelve) hours. for 11 days            CHANGE how you take these medications      metoprolol succinate 100 MG 24 hr tablet  Commonly known as: TOPROL-XL  Take 1 tablet (100 mg total) by mouth once daily.  Start taking on: June 18, 2024  What changed:   medication strength  how much to take            CONTINUE taking these medications      atorvastatin 10 MG tablet  Commonly known as: LIPITOR  Take 10 mg by mouth once daily. Patient doesn't know the dose     cetirizine 10 MG tablet  Commonly known as: ZYRTEC  Take 10 mg by mouth once daily.     diclofenac sodium 1 % Gel  Commonly known as: VOLTAREN  Apply 2 g topically once daily.     metFORMIN 500 MG tablet  Commonly known as: GLUCOPHAGE  Take 500 mg by mouth 2 (two) times daily with meals.     omeprazole 20 MG capsule  Commonly known as: PRILOSEC  Take 20 mg by mouth once daily.            STOP taking these medications      HYDROcodone-acetaminophen 5-325 mg per tablet  Commonly known as: NORCO     lisinopriL 20 MG tablet  Commonly known as: PRINIVIL,ZESTRIL              Indwelling Lines/Drains at time of discharge:   Lines/Drains/Airways       None                   Time spent on the discharge of patient:  over 30  minutes         Gill Cox MD  Department of Hospital Medicine  [facility] [place] - Observation

## 2024-06-17 NOTE — PLAN OF CARE
Case Management Final Discharge Note      Discharge Disposition: Home health with [facility]) [phone]    New DME ordered / company name: None    Relevant SDOH / Transition of Care Barriers:  None    Primary Caretaker and contact information: Patient is independent and has family able to assist at home;  Liz Jacome (Sister) [phone], [phone]; Myla Fulton (Spouse) [phone]      Scheduled followup appointment: Patient instructed to contact PCP to schedule follow up appointment    Referrals placed: Referral placed for Psychology    Transportation: Private vehicle/family taking patient home      Patient and family educated on discharge services and updated on DC plan.  Patient will receive home infusion services with [id number], an [facility] - [place], [place]: [phone]; agency will visit family at home today, to go over relevant education. CM attempted to schedule PCP appointment, but phone number to doctor's office was disconnected; patient instructed to contact her doctor to schedule follow up. Bedside RN notified, patient clear to discharge from Case Management Perspective.    06/17/24 1314   Final Note   Anticipated Discharge Disposition Home-Health   Hospital Resources/Appts/Education Provided Provided patient/caregiver with written discharge plan information;Post-Acute resouces added to AVS   Post-Acute Status   Post-Acute Authorization Home Health   Home Health Status Set-up Complete/Auth obtained   Discharge Delays None known at this time

## 2024-06-17 NOTE — NURSING
[facility] [place]  Nurses Note -- 4 Eyes      6/17/2024       Skin assessed on: Q Shift      [x] No Pressure Injuries Present    [x]Prevention Measures Documented    [] Yes LDA  for Pressure Injury Previously documented     [] Yes New Pressure Injury Discovered   [] LDA for New Pressure Injury Added      Attending RN:  Lula Hutchinson LPN     Second RN:  Lorna OLEARY

## 2024-06-17 NOTE — PROCEDURES
"Sussy Fulton is a 79 y.o. female patient.    Temp: 98.2 °F (36.8 °C) (06/17/24 1126)  Pulse: 97 (06/17/24 1126)  Resp: 18 (06/17/24 1126)  BP: 133/73 (06/17/24 1126)  SpO2: 100 % (06/17/24 1126)  Weight: 79 kg (174 lb 2.6 oz) (06/07/24 2206)  Height: 5' 5" (165.1 cm) (06/07/24 2206)    PICC  Date/Time: 6/17/2024 12:30 PM  Performed by: Tad East RN  Consent Done: Yes  Time out: Immediately prior to procedure a time out was called to verify the correct patient, procedure, equipment, support staff and site/side marked as required  Indications: med administration and vascular access  Anesthesia: local infiltration  Local anesthetic: lidocaine 1% without epinephrine  Anesthetic Total (mL): 2  Preparation: skin prepped with ChloraPrep  Skin prep agent dried: skin prep agent completely dried prior to procedure  Sterile barriers: all five maximum sterile barriers used - cap, mask, sterile gown, sterile gloves, and large sterile sheet  Hand hygiene: hand hygiene performed prior to central venous catheter insertion  Location details: right basilic  Catheter type: double lumen  Catheter size: 5 Fr  Catheter Length: 38cm    no (over wire)Number of attempts: 1  Post-procedure: blood return through all ports, chlorhexidine patch and sterile dressing applied    Assessment: placement verified by x-ray, tip termination and successful placement  Comments: RUE midline over wired to a DL PICC using sterile technique. Tip intact at 12cm           Name Sameer East RN   6/17/2024    "

## 2024-06-17 NOTE — DISCHARGE INSTRUCTIONS
When do I need to call the doctor?   Signs that the encephalitis may be getting worse. Go to the ER right away.  Seizures  Confusion  Muscle weakness or paralysis  Very bad headache  Problem waking up or staying awake  Changes in behavior  Problems with memory  Call your doctor for:  Signs of infection. These include fever of 100.4°F (38°C) or higher; chills; very bad sore throat; ear or sinus pain; cough; more sputum, or change in color of sputum; mouth sores.  Poor appetite and loss of energy  Not able to look at bright lights  Stiff neck and back  Throwing up more than 3 times in 48 hours  You are not feeling better in 2 to 3 days or you are feeling worse.

## 2024-06-17 NOTE — NURSING
Patient is discharged on room air, no distress noted. Tele removed. Picc line in place. Instructions printed, to be reviewed by virtual nurse. Spouse and sister at bedside

## 2024-06-17 NOTE — PLAN OF CARE
06/17/24 1200   Medicare Message   Important Message from Medicare regarding Discharge Appeal Rights Given to patient/caregiver;Explained to patient/caregiver;Other (comments)  (Spoke with patient's sister,  Liz Jacome (Sister)  [phone] and spouse,  Myla Fulton  Spouse  [phone]   at bedside.  Explained IMM, they verbalized underdstanding,   signed)   Date IMM was signed 06/17/24   Time IMM was signed [id number]

## 2024-06-17 NOTE — PLAN OF CARE
Problem: Adult Inpatient Plan of Care  Goal: Absence of Hospital-Acquired Illness or Injury  Intervention: Identify and Manage Fall Risk  Flowsheets (Taken 6/17/2024 0342)  Safety Promotion/Fall Prevention:   assistive device/personal item within reach   bedside commode chair   commode/urinal/bedpan at bedside   diversional activities provided   Fall Risk reviewed with patient/family   family to remain at bedside   lighting adjusted   medications reviewed   nonskid shoes/socks when out of bed   Roll belt self-releasing   room near unit station   side rails raised x 2   instructed to call staff for mobility  Intervention: Prevent Skin Injury  Flowsheets (Taken 6/17/2024 0342)  Body Position:   position changed independently   turned  Device Skin Pressure Protection: absorbent pad utilized/changed  Intervention: Prevent Infection  Flowsheets (Taken 6/17/2024 0342)  Infection Prevention:   environmental surveillance performed   hand hygiene promoted   single patient room provided  Goal: Optimal Comfort and Wellbeing  Intervention: Monitor Pain and Promote Comfort  Flowsheets (Taken 6/17/2024 0342)  Pain Management Interventions: care clustered  Intervention: Provide Person-Centered Care  Flowsheets (Taken 6/17/2024 0342)  Trust Relationship/Rapport:   care explained   choices provided     Problem: Skin Injury Risk Increased  Goal: Skin Health and Integrity  Intervention: Optimize Skin Protection  Flowsheets (Taken 6/17/2024 0342)  Pressure Reduction Techniques: frequent weight shift encouraged  Activity Management: Up to bedside commode - L3  Head of Bed (HOB) Positioning: HOB elevated     Problem: Wound  Goal: Optimal Coping  Intervention: Support Patient and Family Response  Flowsheets (Taken 6/17/2024 0342)  Supportive Measures: active listening utilized  Goal: Optimal Functional Ability  Intervention: Optimize Functional Ability  Flowsheets (Taken 6/17/2024 0342)  Activity Management: Up to bedside commode -  L3  Activity Assistance Provided: assistance, 1 person  Goal: Absence of Infection Signs and Symptoms  Intervention: Prevent or Manage Infection  Flowsheets (Taken 6/17/2024 0342)  Infection Management: aseptic technique maintained  Isolation Precautions:   precautions maintained   droplet  Goal: Optimal Pain Control and Function  Intervention: Prevent or Manage Pain  Flowsheets (Taken 6/17/2024 0342)  Sleep/Rest Enhancement: awakenings minimized  Pain Management Interventions: care clustered  Goal: Skin Health and Integrity  Intervention: Optimize Skin Protection  Flowsheets (Taken 6/17/2024 0342)  Pressure Reduction Techniques: frequent weight shift encouraged  Activity Management: Up to bedside commode - L3  Head of Bed (HOB) Positioning: HOB elevated  Goal: Optimal Wound Healing  Intervention: Promote Wound Healing  Flowsheets (Taken 6/17/2024 0342)  Sleep/Rest Enhancement: awakenings minimized

## 2024-06-18 LAB
B DERMAT AG UR QL IA: NOT DETECTED
BACTERIA BLD CULT: NORMAL
BACTERIA BLD CULT: NORMAL
BACTERIA CSF CULT: NO GROWTH
BLASTOMYCES AG RESULT: NOT DETECTED NG/ML
GRAM STN SPEC: NORMAL
H CAPSUL AG UR-MCNC: NOT DETECTED NG/ML
HISTOPLASMA ANTIGEN URINE: NOT DETECTED
HISTOPLASMA/BLASTOMYCES AG VALUE: NOT DETECTED NG/ML
HISTOPLASMA/BLASTOMYCES RESULT: NOT DETECTED

## 2024-06-18 PROCEDURE — G0180 MD CERTIFICATION HHA PATIENT: HCPCS | Mod: ,,, | Performed by: STUDENT IN AN ORGANIZED HEALTH CARE EDUCATION/TRAINING PROGRAM

## 2024-06-20 ENCOUNTER — TELEPHONE (OUTPATIENT)
Dept: INFECTIOUS DISEASES | Facility: CLINIC | Age: 79
End: 2024-06-20
Payer: MEDICARE

## 2024-06-21 ENCOUNTER — LAB VISIT (OUTPATIENT)
Dept: LAB | Facility: HOSPITAL | Age: 79
End: 2024-06-21
Attending: STUDENT IN AN ORGANIZED HEALTH CARE EDUCATION/TRAINING PROGRAM
Payer: MEDICARE

## 2024-06-21 DIAGNOSIS — G04.2: ICD-10-CM

## 2024-06-21 DIAGNOSIS — G04.2: Primary | ICD-10-CM

## 2024-06-21 LAB
ALBUMIN SERPL BCP-MCNC: 3 G/DL (ref 3.5–5.2)
ALP SERPL-CCNC: 55 U/L (ref 55–135)
ALT SERPL W/O P-5'-P-CCNC: 10 U/L (ref 10–44)
ANION GAP SERPL CALC-SCNC: 11 MMOL/L (ref 8–16)
AST SERPL-CCNC: 13 U/L (ref 10–40)
BASOPHILS # BLD AUTO: 0.01 K/UL (ref 0–0.2)
BASOPHILS NFR BLD: 0.2 % (ref 0–1.9)
BILIRUB SERPL-MCNC: 0.6 MG/DL (ref 0.1–1)
BUN SERPL-MCNC: 9 MG/DL (ref 8–23)
CALCIUM SERPL-MCNC: 9.5 MG/DL (ref 8.7–10.5)
CHLORIDE SERPL-SCNC: 100 MMOL/L (ref 95–110)
CO2 SERPL-SCNC: 27 MMOL/L (ref 23–29)
CREAT SERPL-MCNC: 1 MG/DL (ref 0.5–1.4)
CRP SERPL-MCNC: 13.8 MG/L (ref 0–8.2)
DIFFERENTIAL METHOD BLD: ABNORMAL
EOSINOPHIL # BLD AUTO: 0.2 K/UL (ref 0–0.5)
EOSINOPHIL NFR BLD: 3.4 % (ref 0–8)
ERYTHROCYTE [DISTWIDTH] IN BLOOD BY AUTOMATED COUNT: 13.7 % (ref 11.5–14.5)
EST. GFR  (NO RACE VARIABLE): 58 ML/MIN/1.73 M^2
GLUCOSE SERPL-MCNC: 135 MG/DL (ref 70–110)
HCT VFR BLD AUTO: 34.8 % (ref 37–48.5)
HGB BLD-MCNC: 11.2 G/DL (ref 12–16)
IMM GRANULOCYTES # BLD AUTO: 0.02 K/UL (ref 0–0.04)
IMM GRANULOCYTES NFR BLD AUTO: 0.4 % (ref 0–0.5)
LYMPHOCYTES # BLD AUTO: 1.7 K/UL (ref 1–4.8)
LYMPHOCYTES NFR BLD: 32.2 % (ref 18–48)
MCH RBC QN AUTO: 28.9 PG (ref 27–31)
MCHC RBC AUTO-ENTMCNC: 32.2 G/DL (ref 32–36)
MCV RBC AUTO: 90 FL (ref 82–98)
MONOCYTES # BLD AUTO: 0.4 K/UL (ref 0.3–1)
MONOCYTES NFR BLD: 8.4 % (ref 4–15)
NEUTROPHILS # BLD AUTO: 2.9 K/UL (ref 1.8–7.7)
NEUTROPHILS NFR BLD: 55.4 % (ref 38–73)
NRBC BLD-RTO: 0 /100 WBC
PLATELET # BLD AUTO: 334 K/UL (ref 150–450)
PMV BLD AUTO: 9.6 FL (ref 9.2–12.9)
POTASSIUM SERPL-SCNC: 3.8 MMOL/L (ref 3.5–5.1)
PROT SERPL-MCNC: 6.5 G/DL (ref 6–8.4)
RBC # BLD AUTO: 3.87 M/UL (ref 4–5.4)
SODIUM SERPL-SCNC: 138 MMOL/L (ref 136–145)
WBC # BLD AUTO: 5.22 K/UL (ref 3.9–12.7)

## 2024-06-21 PROCEDURE — 86140 C-REACTIVE PROTEIN: CPT | Performed by: STUDENT IN AN ORGANIZED HEALTH CARE EDUCATION/TRAINING PROGRAM

## 2024-06-21 PROCEDURE — 85025 COMPLETE CBC W/AUTO DIFF WBC: CPT | Performed by: STUDENT IN AN ORGANIZED HEALTH CARE EDUCATION/TRAINING PROGRAM

## 2024-06-21 PROCEDURE — 80053 COMPREHEN METABOLIC PANEL: CPT | Performed by: STUDENT IN AN ORGANIZED HEALTH CARE EDUCATION/TRAINING PROGRAM

## 2024-06-24 ENCOUNTER — LAB VISIT (OUTPATIENT)
Dept: LAB | Facility: HOSPITAL | Age: 79
End: 2024-06-24
Attending: STUDENT IN AN ORGANIZED HEALTH CARE EDUCATION/TRAINING PROGRAM
Payer: MEDICARE

## 2024-06-24 DIAGNOSIS — G04.2: Primary | ICD-10-CM

## 2024-06-24 LAB
ALBUMIN SERPL BCP-MCNC: 3.1 G/DL (ref 3.5–5.2)
ALP SERPL-CCNC: 50 U/L (ref 55–135)
ALT SERPL W/O P-5'-P-CCNC: 9 U/L (ref 10–44)
ANION GAP SERPL CALC-SCNC: 12 MMOL/L (ref 8–16)
AST SERPL-CCNC: 15 U/L (ref 10–40)
BASOPHILS # BLD AUTO: 0.07 K/UL (ref 0–0.2)
BASOPHILS NFR BLD: 1.4 % (ref 0–1.9)
BILIRUB SERPL-MCNC: 0.5 MG/DL (ref 0.1–1)
BUN SERPL-MCNC: 12 MG/DL (ref 8–23)
CALCIUM SERPL-MCNC: 9.5 MG/DL (ref 8.7–10.5)
CHLORIDE SERPL-SCNC: 102 MMOL/L (ref 95–110)
CO2 SERPL-SCNC: 27 MMOL/L (ref 23–29)
CREAT SERPL-MCNC: 1.1 MG/DL (ref 0.5–1.4)
CRP SERPL-MCNC: 6.3 MG/L (ref 0–8.2)
DIFFERENTIAL METHOD BLD: ABNORMAL
EOSINOPHIL # BLD AUTO: 0.2 K/UL (ref 0–0.5)
EOSINOPHIL NFR BLD: 3.5 % (ref 0–8)
ERYTHROCYTE [DISTWIDTH] IN BLOOD BY AUTOMATED COUNT: 14.2 % (ref 11.5–14.5)
EST. GFR  (NO RACE VARIABLE): 52 ML/MIN/1.73 M^2
GLUCOSE SERPL-MCNC: 140 MG/DL (ref 70–110)
HCT VFR BLD AUTO: 34.4 % (ref 37–48.5)
HGB BLD-MCNC: 10.8 G/DL (ref 12–16)
IMM GRANULOCYTES # BLD AUTO: 0.01 K/UL (ref 0–0.04)
IMM GRANULOCYTES NFR BLD AUTO: 0.2 % (ref 0–0.5)
LYMPHOCYTES # BLD AUTO: 1.6 K/UL (ref 1–4.8)
LYMPHOCYTES NFR BLD: 33.5 % (ref 18–48)
MCH RBC QN AUTO: 28.5 PG (ref 27–31)
MCHC RBC AUTO-ENTMCNC: 31.4 G/DL (ref 32–36)
MCV RBC AUTO: 91 FL (ref 82–98)
MONOCYTES # BLD AUTO: 0.5 K/UL (ref 0.3–1)
MONOCYTES NFR BLD: 9.3 % (ref 4–15)
NEUTROPHILS # BLD AUTO: 2.5 K/UL (ref 1.8–7.7)
NEUTROPHILS NFR BLD: 52.1 % (ref 38–73)
NRBC BLD-RTO: 0 /100 WBC
PLATELET # BLD AUTO: 313 K/UL (ref 150–450)
PMV BLD AUTO: 9.7 FL (ref 9.2–12.9)
POTASSIUM SERPL-SCNC: 3.4 MMOL/L (ref 3.5–5.1)
PROT SERPL-MCNC: 6.3 G/DL (ref 6–8.4)
RBC # BLD AUTO: 3.79 M/UL (ref 4–5.4)
SODIUM SERPL-SCNC: 141 MMOL/L (ref 136–145)
WBC # BLD AUTO: 4.86 K/UL (ref 3.9–12.7)

## 2024-06-24 PROCEDURE — 86140 C-REACTIVE PROTEIN: CPT | Performed by: STUDENT IN AN ORGANIZED HEALTH CARE EDUCATION/TRAINING PROGRAM

## 2024-06-24 PROCEDURE — 80053 COMPREHEN METABOLIC PANEL: CPT | Performed by: STUDENT IN AN ORGANIZED HEALTH CARE EDUCATION/TRAINING PROGRAM

## 2024-06-24 PROCEDURE — 85025 COMPLETE CBC W/AUTO DIFF WBC: CPT | Performed by: STUDENT IN AN ORGANIZED HEALTH CARE EDUCATION/TRAINING PROGRAM

## 2024-06-26 ENCOUNTER — TELEPHONE (OUTPATIENT)
Dept: ADMINISTRATIVE | Facility: CLINIC | Age: 79
End: 2024-06-26
Payer: MEDICARE

## 2024-06-26 ENCOUNTER — PATIENT OUTREACH (OUTPATIENT)
Dept: ADMINISTRATIVE | Facility: OTHER | Age: 79
End: 2024-06-26
Payer: MEDICARE

## 2024-06-26 NOTE — PROGRESS NOTES
CHW - Initial Contact    This Community Health Worker completed OR updated the Social Determinant of Health questionnaire with patient via telephone today.    Pt identified barriers of most importance are: Pt denies needing any resources for SDOH at this time.    Referrals to community agencies completed with patient/caregiver consent outside of [facility] include: no  Referrals were put through [facility] - no:   Support and Services: none  Other information discussed the patient needs / wants help with: SDOH   Follow up required: no  No future outreach task assigned

## 2024-06-28 ENCOUNTER — TELEPHONE (OUTPATIENT)
Dept: INFECTIOUS DISEASES | Facility: CLINIC | Age: 79
End: 2024-06-28

## 2024-06-28 ENCOUNTER — OFFICE VISIT (OUTPATIENT)
Dept: INFECTIOUS DISEASES | Facility: CLINIC | Age: 79
End: 2024-06-28
Payer: MEDICARE

## 2024-06-28 DIAGNOSIS — G04.90 MENINGOENCEPHALITIS: Primary | ICD-10-CM

## 2024-06-28 NOTE — PROGRESS NOTES
The patient location is: home  The chief complaint leading to consultation is: aseptic meningitis     Visit type: audiovisual    Face to Face time with patient: 15 minutes  27 minutes of total time spent on the encounter, which includes face to face time and non-face to face time preparing to see the patient (eg, review of tests), Obtaining and/or reviewing separately obtained history, Documenting clinical information in the electronic or other health record, Independently interpreting results (not separately reported) and communicating results to the patient/family/caregiver, or Care coordination (not separately reported).         Each patient to whom he or she provides medical services by telemedicine is:  (1) informed of the relationship between the physician and patient and the respective role of any other health care provider with respect to management of the patient; and (2) notified that he or she may decline to receive medical services by telemedicine and may withdraw from such care at any time.    Notes:     Infectious Diseases Progress Note  Subjective:     Chief Complaint: follow up on aseptic meningitis     HPI: Sussy Fulton is a 79 year old woman with hypertension and diabetes who was admitted for weakness, malaise and fevers x 1 day.  UA bland, no leukocytosis, CTH wnl, CT a/p with hydroureteronephrosis (no obstructing stones s/p robison) otherwise unremarkable. UA neg x 2. Noted to have neck pain and worsening encephalopathy concerning for meningitis. Broadened to vanc, ceftriaxone, ampicillin and acyclovir. LP revealed cloudy csf (wbc 500/ lymp / prtn 223/ gluc 100). Ampho started due to concern for fungal cns infection, culture no growth to day. Recent URI may be source of cns infection, LP results more consistent with viral etiology. CSF cultures and crypto antigen negative. CSF encephalitis/meningitis PCR panel negative x 2, HSV 1/2 PCR negative x 2, VZV negative. HIV neg. EEG w/o seizures. RIP  neg. Karius negative. Mental status appears to be waxing and waning which suggested an element of hospital delirium. Viral encephalitis high on differential, but recommended to consider other causes including paraneoplastic and autoimmune. Autoimmune panel negative, paraneoplastic panel still in process. Discharged on empiric 21 day course of acyclovir given unclear cause. She completes the course today. She is feeling close to normal now. Still more time that usual. Headaches, shoulder pain, neck pain, abdominal pain are improved. Blood glucoses and blood pressures have been normal.         There is no immunization history on file for this patient.     Review of Systems   Constitutional: Positive for decreased appetite and malaise/fatigue. Negative for chills and fever.   Gastrointestinal:  Negative for abdominal pain.   Neurological:  Positive for headaches (improved).        Past Medical History:   Diagnosis Date    Diabetes mellitus     Hypertension      No past surgical history on file.  No family history on file.  Social History     Tobacco Use    Smoking status: Never     Passive exposure: Past    Smokeless tobacco: Never   Substance Use Topics    Alcohol use: Not Currently       Objective:     Physical Exam  Constitutional:       Appearance: She is not ill-appearing.   Pulmonary:      Effort: No respiratory distress.   Neurological:      Mental Status: She is alert.   Psychiatric:         Mood and Affect: Mood normal.         Data:    All data, including recent labs, radiology, and pathology, has been independently reviewed.    Labs:    Recent Labs   Lab Result Units 06/09/24  0437 06/10/24  0519 06/17/24  0458 06/20/24  1830 06/24/24  1430   WBC K/uL 10.97   < > 5.46 5.22 4.86   Hemoglobin g/dL 12.4   < > 10.5* 11.2* 10.8*   Hematocrit % 37.8   < > 31.3* 34.8* 34.4*   Sodium mmol/L 138   < > 138 138 141   Potassium mmol/L 3.4*   < > 3.3* 3.8 3.4*   Chloride mmol/L 106   < > 101 100 102   BUN mg/dL 18   < >  12 9 12   Creatinine mg/dL 1.0   < > 1.1 1.0 1.1   AST U/L 20   < > 13 13 15   ALT U/L 11   < > 13 10 9*   Alkaline Phosphatase U/L 52*   < > 40* 55 50*   Total Bilirubin mg/dL 0.4   < > 0.5 0.6 0.5   CRP mg/L  --    < >  --  13.8* 6.3   HIV 1/2 Ag/Ab  Non-reactive  --   --   --   --     < > = values in this interval not displayed.        Radiology:    No results found in the last 24 hours.     Assessment:  Aseptic meningitis: unclear etiology. Completed 21 days of acyclovir empirically. She is improved and feeling close to her normal, but with some continued fatigue.   - stop acyclovir  - remove PICC line  - paraneoplastic panel still pending, will schedule follow up after it is anticipated to result     Follow up in 2 weeks    I spent a total of 27 minutes on the day of the visit.  This includes face to face time and non-face to face time preparing to see the patient (eg, review of tests), obtaining and/or reviewing separately obtained history, documenting clinical information in the electronic or other health record, independently interpreting results and communicating results to the patient/family/caregiver, or care coordinator.      Marla Becerril MD  Infectious Disease

## 2024-06-28 NOTE — TELEPHONE ENCOUNTER
- Spoke to Jia at [facility] [place] to let her know that  would like for pt IV to be removed today.

## 2024-07-10 ENCOUNTER — OFFICE VISIT (OUTPATIENT)
Dept: INFECTIOUS DISEASES | Facility: CLINIC | Age: 79
End: 2024-07-10
Payer: MEDICARE

## 2024-07-10 DIAGNOSIS — G04.90 MENINGOENCEPHALITIS: Primary | ICD-10-CM

## 2024-07-10 PROCEDURE — 99213 OFFICE O/P EST LOW 20 MIN: CPT | Mod: 95,,, | Performed by: STUDENT IN AN ORGANIZED HEALTH CARE EDUCATION/TRAINING PROGRAM

## 2024-07-10 NOTE — PROGRESS NOTES
The patient location is: home  The chief complaint leading to consultation is: follow up on meningitis     Visit type: audiovisual    Face to Face time with patient: 7 minutes  24 minutes of total time spent on the encounter, which includes face to face time and non-face to face time preparing to see the patient (eg, review of tests), Obtaining and/or reviewing separately obtained history, Documenting clinical information in the electronic or other health record, Independently interpreting results (not separately reported) and communicating results to the patient/family/caregiver, or Care coordination (not separately reported).         Each patient to whom he or she provides medical services by telemedicine is:  (1) informed of the relationship between the physician and patient and the respective role of any other health care provider with respect to management of the patient; and (2) notified that he or she may decline to receive medical services by telemedicine and may withdraw from such care at any time.    Notes:     Infectious Diseases Progress Note  Subjective:     Chief Complaint: follow up on aseptic meningitis      HPI: Sussy Fulton is a 79 year old woman with hypertension and diabetes who was admitted for weakness, malaise and fevers x 1 day.  UA bland, no leukocytosis, CTH wnl, CT a/p with hydroureteronephrosis (no obstructing stones s/p robison) otherwise unremarkable. UA neg x 2. Noted to have neck pain and worsening encephalopathy concerning for meningitis. Broadened to vanc, ceftriaxone, ampicillin and acyclovir. LP with cloudy csf (wbc 500/ lymph / ptn 223/ gluc 100). Ampho started due to concern for fungal cns infection, culture negative. Recent URI may be source of CNS infection, LP results more consistent with viral etiology. CSF cultures and crypto antigen negative. CSF encephalitis/meningitis PCR panel negative x 2, HSV 1/2 PCR negative x 2, VZV negative. HIV neg. EEG w/o seizures. RIP neg.  Karius negative. Mental status appears to be waxing and waning which suggested an element of hospital delirium. Viral encephalitis high on differential, but recommended to consider other causes including paraneoplastic and autoimmune. Autoimmune panel and paraneoplastic panels negative. Discharged on empiric 21 day course of acyclovir given unclear cause. She completes the course at home. She is feeling close to normal now. Headaches, shoulder pain, neck pain, abdominal pain are improved. Blood glucoses and blood pressures have been normal. She has been following with primary care and has home health with PT and OT.         There is no immunization history on file for this patient.     Review of Systems   Constitutional: Positive for malaise/fatigue. Negative for fever.   Cardiovascular:  Positive for dyspnea on exertion.   Musculoskeletal:  Positive for arthritis.   Gastrointestinal: Negative.    Neurological: Negative.         Past Medical History:   Diagnosis Date    Diabetes mellitus     Hypertension      No past surgical history on file.  No family history on file.  Social History     Tobacco Use    Smoking status: Never     Passive exposure: Past    Smokeless tobacco: Never   Substance Use Topics    Alcohol use: Not Currently       Objective:     Physical Exam  Constitutional:       Appearance: She is not ill-appearing.   Pulmonary:      Effort: Pulmonary effort is normal.   Neurological:      Mental Status: She is alert and oriented to person, place, and time.   Psychiatric:         Mood and Affect: Mood normal.         Data:    All data, including recent labs, radiology, and pathology, has been independently reviewed.    Labs:    Recent Labs   Lab Result Units 06/09/24  0437 06/10/24  0519 06/17/24  0458 06/20/24  1830 06/24/24  1430   WBC K/uL 10.97   < > 5.46 5.22 4.86   Hemoglobin g/dL 12.4   < > 10.5* 11.2* 10.8*   Hematocrit % 37.8   < > 31.3* 34.8* 34.4*   Sodium mmol/L 138   < > 138 138 141    Potassium mmol/L 3.4*   < > 3.3* 3.8 3.4*   Chloride mmol/L 106   < > 101 100 102   BUN mg/dL 18   < > 12 9 12   Creatinine mg/dL 1.0   < > 1.1 1.0 1.1   AST U/L 20   < > 13 13 15   ALT U/L 11   < > 13 10 9*   Alkaline Phosphatase U/L 52*   < > 40* 55 50*   Total Bilirubin mg/dL 0.4   < > 0.5 0.6 0.5   CRP mg/L  --    < >  --  13.8* 6.3   HIV 1/2 Ag/Ab  Non-reactive  --   --   --   --     < > = values in this interval not displayed.        Radiology:    No results found in the last 24 hours.     Assessment:  Aseptic meningitis: unclear etiology. Completed 21 days of acyclovir empirically. She is improved and feeling close to her normal, but with some continued fatigue.   - hold further antimicrobials  - counseled to follow closely with primary care    Follow up as needed     I spent a total of 24 minutes on the day of the visit.  This includes face to face time and non-face to face time preparing to see the patient (eg, review of tests), obtaining and/or reviewing separately obtained history, documenting clinical information in the electronic or other health record, independently interpreting results and communicating results to the patient/family/caregiver, or care coordinator.    Marla Becerril MD  Infectious Disease

## 2024-11-21 ENCOUNTER — EXTERNAL HOME HEALTH (OUTPATIENT)
Dept: HOME HEALTH SERVICES | Facility: HOSPITAL | Age: 79
End: 2024-11-21
Payer: MEDICARE

## 2024-12-04 ENCOUNTER — DOCUMENT SCAN (OUTPATIENT)
Dept: HOME HEALTH SERVICES | Facility: HOSPITAL | Age: 79
End: 2024-12-04
Payer: MEDICARE